# Patient Record
Sex: FEMALE | Race: WHITE | Employment: OTHER | URBAN - METROPOLITAN AREA
[De-identification: names, ages, dates, MRNs, and addresses within clinical notes are randomized per-mention and may not be internally consistent; named-entity substitution may affect disease eponyms.]

---

## 2018-04-20 ENCOUNTER — HOSPITAL ENCOUNTER (OUTPATIENT)
Age: 63
Discharge: HOME | End: 2018-04-22
Payer: COMMERCIAL

## 2018-04-20 LAB
BASOPHILS ABSOLUTE: 0.05 E9/L (ref 0–0.2)
BASOPHILS RELATIVE PERCENT: 0.6 % (ref 0–2)
EOSINOPHILS ABSOLUTE: 0.2 E9/L (ref 0.05–0.5)
EOSINOPHILS RELATIVE PERCENT: 2.5 % (ref 0–6)
HCT VFR BLD CALC: 37.3 % (ref 34–48)
HEMOGLOBIN: 12.1 G/DL (ref 11.5–15.5)
IMMATURE GRANULOCYTES #: 0.04 E9/L
IMMATURE GRANULOCYTES %: 0.5 % (ref 0–5)
LYMPHOCYTES ABSOLUTE: 2.15 E9/L (ref 1.5–4)
LYMPHOCYTES RELATIVE PERCENT: 26.4 % (ref 20–42)
MCH RBC QN AUTO: 30.3 PG (ref 26–35)
MCHC RBC AUTO-ENTMCNC: 32.4 % (ref 32–34.5)
MCV RBC AUTO: 93.3 FL (ref 80–99.9)
MONOCYTES ABSOLUTE: 0.61 E9/L (ref 0.1–0.95)
MONOCYTES RELATIVE PERCENT: 7.5 % (ref 2–12)
NEUTROPHILS ABSOLUTE: 5.1 E9/L (ref 1.8–7.3)
NEUTROPHILS RELATIVE PERCENT: 62.5 % (ref 43–80)
PDW BLD-RTO: 13 FL (ref 11.5–15)
PLATELET # BLD: 314 E9/L (ref 130–450)
PMV BLD AUTO: 10.7 FL (ref 7–12)
RBC # BLD: 4 E12/L (ref 3.5–5.5)
T4 FREE: 1.29 NG/DL (ref 0.93–1.7)
TSH SERPL DL<=0.05 MIU/L-ACNC: 4.34 UIU/ML (ref 0.27–4.2)
WBC # BLD: 8.2 E9/L (ref 4.5–11.5)

## 2018-04-20 PROCEDURE — 84443 ASSAY THYROID STIM HORMONE: CPT

## 2018-04-20 PROCEDURE — 84439 ASSAY OF FREE THYROXINE: CPT

## 2018-04-20 PROCEDURE — 82306 VITAMIN D 25 HYDROXY: CPT

## 2018-04-20 PROCEDURE — 85025 COMPLETE CBC W/AUTO DIFF WBC: CPT

## 2018-04-21 LAB — VITAMIN D 25-HYDROXY: 25 NG/ML (ref 30–100)

## 2018-05-16 ENCOUNTER — HOSPITAL ENCOUNTER (OUTPATIENT)
Dept: MAMMOGRAPHY | Age: 63
Discharge: HOME | End: 2018-05-18
Payer: COMMERCIAL

## 2018-05-16 DIAGNOSIS — Z12.31 SCREENING MAMMOGRAM, ENCOUNTER FOR: ICD-10-CM

## 2018-05-16 PROCEDURE — 77067 SCR MAMMO BI INCL CAD: CPT

## 2019-07-01 ENCOUNTER — HOSPITAL ENCOUNTER (OUTPATIENT)
Age: 64
Discharge: HOME | End: 2019-07-01
Payer: MEDICARE

## 2019-07-01 LAB
ALBUMIN SERPL-MCNC: 4.3 G/DL (ref 3.5–5.2)
ALP BLD-CCNC: 84 U/L (ref 35–104)
ALT SERPL-CCNC: 13 U/L (ref 0–32)
ANION GAP SERPL CALCULATED.3IONS-SCNC: 11 MMOL/L (ref 7–16)
AST SERPL-CCNC: 14 U/L (ref 0–31)
BASOPHILS ABSOLUTE: 0.05 E9/L (ref 0–0.2)
BASOPHILS RELATIVE PERCENT: 0.7 % (ref 0–2)
BILIRUB SERPL-MCNC: 0.3 MG/DL (ref 0–1.2)
BILIRUBIN URINE: NEGATIVE
BLOOD, URINE: NEGATIVE
BUN BLDV-MCNC: 21 MG/DL (ref 8–23)
CALCIUM SERPL-MCNC: 9.8 MG/DL (ref 8.6–10.2)
CHLORIDE BLD-SCNC: 99 MMOL/L (ref 98–107)
CHOLESTEROL, TOTAL: 131 MG/DL (ref 0–199)
CLARITY: CLEAR
CO2: 26 MMOL/L (ref 22–29)
COLOR: YELLOW
CREAT SERPL-MCNC: 1.2 MG/DL (ref 0.5–1)
CREATININE URINE: 62 MG/DL (ref 29–226)
EOSINOPHILS ABSOLUTE: 0.14 E9/L (ref 0.05–0.5)
EOSINOPHILS RELATIVE PERCENT: 1.9 % (ref 0–6)
GFR AFRICAN AMERICAN: 55
GFR NON-AFRICAN AMERICAN: 45 ML/MIN/1.73
GLUCOSE BLD-MCNC: 123 MG/DL (ref 74–99)
GLUCOSE URINE: NEGATIVE MG/DL
HBA1C MFR BLD: 6.8 % (ref 4–5.6)
HCT VFR BLD CALC: 37.2 % (ref 34–48)
HDLC SERPL-MCNC: 38 MG/DL
HEMOGLOBIN: 12.4 G/DL (ref 11.5–15.5)
IMMATURE GRANULOCYTES #: 0.04 E9/L
IMMATURE GRANULOCYTES %: 0.5 % (ref 0–5)
KETONES, URINE: NEGATIVE MG/DL
LDL CHOLESTEROL CALCULATED: 69 MG/DL (ref 0–99)
LEUKOCYTE ESTERASE, URINE: NEGATIVE
LYMPHOCYTES ABSOLUTE: 2.33 E9/L (ref 1.5–4)
LYMPHOCYTES RELATIVE PERCENT: 31.7 % (ref 20–42)
MCH RBC QN AUTO: 30.4 PG (ref 26–35)
MCHC RBC AUTO-ENTMCNC: 33.3 % (ref 32–34.5)
MCV RBC AUTO: 91.2 FL (ref 80–99.9)
MICROALBUMIN UR-MCNC: <12 MG/L
MICROALBUMIN/CREAT UR-RTO: ABNORMAL (ref 0–30)
MONOCYTES ABSOLUTE: 0.53 E9/L (ref 0.1–0.95)
MONOCYTES RELATIVE PERCENT: 7.2 % (ref 2–12)
NEUTROPHILS ABSOLUTE: 4.27 E9/L (ref 1.8–7.3)
NEUTROPHILS RELATIVE PERCENT: 58 % (ref 43–80)
NITRITE, URINE: NEGATIVE
PDW BLD-RTO: 12.6 FL (ref 11.5–15)
PH UA: 5.5 (ref 5–9)
PLATELET # BLD: 301 E9/L (ref 130–450)
PMV BLD AUTO: 9.4 FL (ref 7–12)
POTASSIUM SERPL-SCNC: 4.8 MMOL/L (ref 3.5–5)
PROTEIN UA: NEGATIVE MG/DL
RBC # BLD: 4.08 E12/L (ref 3.5–5.5)
SODIUM BLD-SCNC: 136 MMOL/L (ref 132–146)
SPECIFIC GRAVITY UA: 1.01 (ref 1–1.03)
T4 FREE: 1.02 NG/DL (ref 0.93–1.7)
TOTAL PROTEIN: 7.2 G/DL (ref 6.4–8.3)
TRIGL SERPL-MCNC: 120 MG/DL (ref 0–149)
TSH SERPL DL<=0.05 MIU/L-ACNC: 2.66 UIU/ML (ref 0.27–4.2)
UROBILINOGEN, URINE: 0.2 E.U./DL
VITAMIN D 25-HYDROXY: 20 NG/ML (ref 30–100)
VLDLC SERPL CALC-MCNC: 24 MG/DL
WBC # BLD: 7.4 E9/L (ref 4.5–11.5)

## 2019-07-01 PROCEDURE — 82306 VITAMIN D 25 HYDROXY: CPT

## 2019-07-01 PROCEDURE — 85025 COMPLETE CBC W/AUTO DIFF WBC: CPT

## 2019-07-01 PROCEDURE — 84443 ASSAY THYROID STIM HORMONE: CPT

## 2019-07-01 PROCEDURE — 80061 LIPID PANEL: CPT

## 2019-07-01 PROCEDURE — 83036 HEMOGLOBIN GLYCOSYLATED A1C: CPT

## 2019-07-01 PROCEDURE — 82570 ASSAY OF URINE CREATININE: CPT

## 2019-07-01 PROCEDURE — 36415 COLL VENOUS BLD VENIPUNCTURE: CPT

## 2019-07-01 PROCEDURE — 82044 UR ALBUMIN SEMIQUANTITATIVE: CPT

## 2019-07-01 PROCEDURE — 80053 COMPREHEN METABOLIC PANEL: CPT

## 2019-07-01 PROCEDURE — 84439 ASSAY OF FREE THYROXINE: CPT

## 2019-07-01 PROCEDURE — 81003 URINALYSIS AUTO W/O SCOPE: CPT

## 2020-01-06 RX ORDER — LISINOPRIL AND HYDROCHLOROTHIAZIDE 20; 12.5 MG/1; MG/1
1 TABLET ORAL DAILY
COMMUNITY

## 2020-01-06 RX ORDER — DULOXETIN HYDROCHLORIDE 60 MG/1
60 CAPSULE, DELAYED RELEASE ORAL NIGHTLY
COMMUNITY

## 2020-01-06 RX ORDER — CETIRIZINE HYDROCHLORIDE 10 MG/1
10 TABLET ORAL DAILY
COMMUNITY

## 2020-01-10 ENCOUNTER — INITIAL CONSULT (OUTPATIENT)
Dept: BARIATRICS/WEIGHT MGMT | Age: 65
End: 2020-01-10
Payer: MEDICARE

## 2020-01-10 VITALS
HEART RATE: 71 BPM | SYSTOLIC BLOOD PRESSURE: 138 MMHG | TEMPERATURE: 97.5 F | DIASTOLIC BLOOD PRESSURE: 48 MMHG | RESPIRATION RATE: 20 BRPM | HEIGHT: 62 IN | BODY MASS INDEX: 39.38 KG/M2 | WEIGHT: 214 LBS

## 2020-01-10 PROBLEM — E11.69 DIABETES MELLITUS TYPE 2 IN OBESE (HCC): Status: ACTIVE | Noted: 2020-01-10

## 2020-01-10 PROBLEM — E66.9 DIABETES MELLITUS TYPE 2 IN OBESE (HCC): Status: ACTIVE | Noted: 2020-01-10

## 2020-01-10 PROBLEM — K21.9 GASTROESOPHAGEAL REFLUX DISEASE WITHOUT ESOPHAGITIS: Status: ACTIVE | Noted: 2020-01-10

## 2020-01-10 PROCEDURE — G8419 CALC BMI OUT NRM PARAM NOF/U: HCPCS | Performed by: SURGERY

## 2020-01-10 PROCEDURE — G8484 FLU IMMUNIZE NO ADMIN: HCPCS | Performed by: SURGERY

## 2020-01-10 PROCEDURE — 1036F TOBACCO NON-USER: CPT | Performed by: SURGERY

## 2020-01-10 PROCEDURE — 99202 OFFICE O/P NEW SF 15 MIN: CPT

## 2020-01-10 PROCEDURE — 99204 OFFICE O/P NEW MOD 45 MIN: CPT | Performed by: SURGERY

## 2020-01-10 PROCEDURE — 3017F COLORECTAL CA SCREEN DOC REV: CPT | Performed by: SURGERY

## 2020-01-10 PROCEDURE — 2022F DILAT RTA XM EVC RTNOPTHY: CPT | Performed by: SURGERY

## 2020-01-10 PROCEDURE — G8427 DOCREV CUR MEDS BY ELIG CLIN: HCPCS | Performed by: SURGERY

## 2020-01-10 PROCEDURE — 3046F HEMOGLOBIN A1C LEVEL >9.0%: CPT | Performed by: SURGERY

## 2020-01-10 NOTE — PATIENT INSTRUCTIONS
Please continue to take your vitamin and mineral supplements as instructed. If you received a blood work prescription today for laboratory monitoring due prior to your next routine follow-up visit, please have this blood work obtained 10 to 14 days prior to your next visit. It is important to fast for 12 hours prior to routine weight loss surgery blood work, EXCEPT for drinking water, to ensure accuracy of results. Please report nausea, vomiting, abdominal pain, or any other problems you experience to your surgeon. For problems related to weight loss surgery, it is best to go to [address] [place] [address] Emergency Department and have your surgeon paged.

## 2020-01-10 NOTE — PROGRESS NOTES
by mouth daily      fluticasone (FLONASE) 50 MCG/ACT nasal spray 1 spray by Nasal route daily 1 Bottle 3    meloxicam (MOBIC) 7.5 MG tablet Take 7.5 mg by mouth daily      oxybutynin (DITROPAN) 5 MG tablet Take 5 mg by mouth 3 times daily      ezetimibe (ZETIA) 10 MG tablet Take 10 mg by mouth daily      Ergocalciferol (VITAMIN D2 PO) Take 1.25 mg by mouth once a week      omeprazole (PRILOSEC) 20 MG capsule Take 20 mg by mouth daily.  lovastatin (MEVACOR) 40 MG tablet Take 40 mg by mouth nightly.  metoprolol (LOPRESSOR) 25 MG tablet Take 25 mg by mouth nightly.  metFORMIN (GLUCOPHAGE) 500 MG tablet Take 500 mg by mouth 2 times daily (with meals).  levothyroxine (SYNTHROID) 50 MCG tablet Take 75 mcg by mouth Daily       gabapentin (NEURONTIN) 600 MG tablet Take 800 mg by mouth daily Takes 2 tablets      sertraline (ZOLOFT) 100 MG tablet Take 100 mg by mouth nightly. Takes 1and 1/2 tablets nightly      albuterol (PROVENTIL HFA;VENTOLIN HFA) 108 (90 BASE) MCG/ACT inhaler Inhale 2 puffs into the lungs every 6 hours as needed.  aspirin 81 MG tablet Take 81 mg by mouth daily.  fluticasone-vilanterol (BREO ELLIPTA) 100-25 MCG/INH AEPB inhaler Inhale 2 puffs into the lungs every 6 hours       No current facility-administered medications for this visit. Social History:   TOBACCO:   reports that she quit smoking about 6 years ago. Her smoking use included cigarettes. She has a 180.00 pack-year smoking history. She has never used smokeless tobacco.  All smokers must join the free smoking cessation program and stop smoking for 3 months before having any Bariatric surgery. ETOH:    reports no history of alcohol use. The patient has a family history that is negative for severe cardiovascular or respiratory issues, negative for reaction to anesthesia. Review of Systems    A complete 10 system review was performed and are otherwise negative unless mentioned in the above HPI.

## 2020-01-22 NOTE — PROGRESS NOTES
[address] [facility]                                                                                                                    PRE OP INSTRUCTIONS FOR  Shira Licona        Date: 1/22/2020    Date of surgery: 01/23/2020   Arrival Time: Hospital will call you between 5pm and 7pm with your final arrival time for surgery    1. Do not eat or drink anything after midnight prior to surgery. This includes no water, chewing gum, mints or ice chips. 2. Take the following medications with a small sip of water on the morning of Surgery: none- bring albuterol inhaler    3. Diabetics may take evening dose of insulin but none after midnight. If you feel symptomatic or low blood sugar morning of surgery drink 1-2 ounces of apple juice only. 4. Aspirin, Ibuprofen, Advil, Naproxen, Vitamin E and other Anti-inflammatory products should be stopped  before surgery  as directed by your physician. Take Tylenol only unless instructed otherwise by your surgeon. 5. Check with your Doctor regarding stopping Plavix, Coumadin, Lovenox, Eliquis, Effient, or other blood thinners. 6. Do not smoke,use illicit drugs and do not drink any alcoholic beverages 24 hours prior to surgery. 7. You may brush your teeth the morning of surgery. DO NOT SWALLOW WATER    8. You MUST make arrangements for a responsible adult to take you home after your surgery. You will not be allowed to leave alone or drive yourself home. It is strongly suggested someone stay with you the first 24 hrs. Your surgery will be cancelled if you do not have a ride home. 9. PEDIATRIC PATIENTS ONLY:  A parent/legal guardian must accompany a child scheduled for surgery and plan to stay at the hospital until the child is discharged. Please do not bring other children with you.     10. Please wear simple, loose fitting clothing to the hospital.  Thea Dessert not bring valuables (money, credit cards, checkbooks, etc.) Do not wear any makeup (including no

## 2020-01-23 ENCOUNTER — ANESTHESIA EVENT (OUTPATIENT)
Dept: ENDOSCOPY | Age: 65
End: 2020-01-23
Payer: MEDICARE

## 2020-01-23 ENCOUNTER — ANESTHESIA (OUTPATIENT)
Dept: ENDOSCOPY | Age: 65
End: 2020-01-23
Payer: MEDICARE

## 2020-01-23 ENCOUNTER — HOSPITAL ENCOUNTER (OUTPATIENT)
Dept: ULTRASOUND IMAGING | Age: 65
Discharge: HOME | End: 2020-01-23
Payer: MEDICARE

## 2020-01-23 ENCOUNTER — HOSPITAL ENCOUNTER (OUTPATIENT)
Age: 65
Setting detail: OUTPATIENT SURGERY
Discharge: HOME | End: 2020-01-23
Attending: SURGERY | Admitting: SURGERY
Payer: MEDICARE

## 2020-01-23 VITALS
SYSTOLIC BLOOD PRESSURE: 120 MMHG | OXYGEN SATURATION: 96 % | TEMPERATURE: 96.9 F | RESPIRATION RATE: 16 BRPM | WEIGHT: 212.3 LBS | HEIGHT: 62 IN | BODY MASS INDEX: 39.07 KG/M2 | DIASTOLIC BLOOD PRESSURE: 57 MMHG | HEART RATE: 60 BPM

## 2020-01-23 VITALS
SYSTOLIC BLOOD PRESSURE: 90 MMHG | RESPIRATION RATE: 17 BRPM | OXYGEN SATURATION: 94 % | DIASTOLIC BLOOD PRESSURE: 49 MMHG

## 2020-01-23 LAB
ALBUMIN SERPL-MCNC: 4.2 G/DL (ref 3.5–5.2)
ALP BLD-CCNC: 88 U/L (ref 35–104)
ALT SERPL-CCNC: 11 U/L (ref 0–32)
ANION GAP SERPL CALCULATED.3IONS-SCNC: 13 MMOL/L (ref 7–16)
AST SERPL-CCNC: 14 U/L (ref 0–31)
BILIRUB SERPL-MCNC: 0.4 MG/DL (ref 0–1.2)
BUN BLDV-MCNC: 22 MG/DL (ref 8–23)
CALCIUM SERPL-MCNC: 9.8 MG/DL (ref 8.6–10.2)
CHLORIDE BLD-SCNC: 99 MMOL/L (ref 98–107)
CHOLESTEROL, TOTAL: 113 MG/DL (ref 0–199)
CO2: 25 MMOL/L (ref 22–29)
CREAT SERPL-MCNC: 1.3 MG/DL (ref 0.5–1)
FERRITIN: 172 NG/ML
FOLATE: 11.7 NG/ML (ref 4.8–24.2)
GFR AFRICAN AMERICAN: 50
GFR NON-AFRICAN AMERICAN: 41 ML/MIN/1.73
GLUCOSE BLD-MCNC: 111 MG/DL (ref 74–99)
HBA1C MFR BLD: 6.5 % (ref 4–5.6)
HCT VFR BLD CALC: 33.5 % (ref 34–48)
HEMOGLOBIN: 11 G/DL (ref 11.5–15.5)
MCH RBC QN AUTO: 30.4 PG (ref 26–35)
MCHC RBC AUTO-ENTMCNC: 32.8 % (ref 32–34.5)
MCV RBC AUTO: 92.5 FL (ref 80–99.9)
METER GLUCOSE: 108 MG/DL (ref 74–99)
PDW BLD-RTO: 13 FL (ref 11.5–15)
PLATELET # BLD: 294 E9/L (ref 130–450)
PMV BLD AUTO: 9.6 FL (ref 7–12)
POTASSIUM SERPL-SCNC: 3.9 MMOL/L (ref 3.5–5)
RBC # BLD: 3.62 E12/L (ref 3.5–5.5)
SODIUM BLD-SCNC: 137 MMOL/L (ref 132–146)
TOTAL PROTEIN: 7.2 G/DL (ref 6.4–8.3)
TRIGL SERPL-MCNC: 117 MG/DL (ref 0–149)
VITAMIN B-12: 462 PG/ML (ref 211–946)
VITAMIN D 25-HYDROXY: 15 NG/ML (ref 30–100)
WBC # BLD: 8.3 E9/L (ref 4.5–11.5)

## 2020-01-23 PROCEDURE — 88342 IMHCHEM/IMCYTCHM 1ST ANTB: CPT

## 2020-01-23 PROCEDURE — 84478 ASSAY OF TRIGLYCERIDES: CPT

## 2020-01-23 PROCEDURE — 36415 COLL VENOUS BLD VENIPUNCTURE: CPT

## 2020-01-23 PROCEDURE — 3609012400 HC EGD TRANSORAL BIOPSY SINGLE/MULTIPLE: Performed by: SURGERY

## 2020-01-23 PROCEDURE — 84425 ASSAY OF VITAMIN B-1: CPT

## 2020-01-23 PROCEDURE — 2580000003 HC RX 258: Performed by: SURGERY

## 2020-01-23 PROCEDURE — 76705 ECHO EXAM OF ABDOMEN: CPT

## 2020-01-23 PROCEDURE — 82607 VITAMIN B-12: CPT

## 2020-01-23 PROCEDURE — 2709999900 HC NON-CHARGEABLE SUPPLY: Performed by: SURGERY

## 2020-01-23 PROCEDURE — 82728 ASSAY OF FERRITIN: CPT

## 2020-01-23 PROCEDURE — 84630 ASSAY OF ZINC: CPT

## 2020-01-23 PROCEDURE — 6360000002 HC RX W HCPCS: Performed by: NURSE ANESTHETIST, CERTIFIED REGISTERED

## 2020-01-23 PROCEDURE — 85027 COMPLETE CBC AUTOMATED: CPT

## 2020-01-23 PROCEDURE — 7100000010 HC PHASE II RECOVERY - FIRST 15 MIN: Performed by: SURGERY

## 2020-01-23 PROCEDURE — 80053 COMPREHEN METABOLIC PANEL: CPT

## 2020-01-23 PROCEDURE — 3700000000 HC ANESTHESIA ATTENDED CARE: Performed by: SURGERY

## 2020-01-23 PROCEDURE — 82746 ASSAY OF FOLIC ACID SERUM: CPT

## 2020-01-23 PROCEDURE — 88305 TISSUE EXAM BY PATHOLOGIST: CPT

## 2020-01-23 PROCEDURE — 82962 GLUCOSE BLOOD TEST: CPT

## 2020-01-23 PROCEDURE — 83036 HEMOGLOBIN GLYCOSYLATED A1C: CPT

## 2020-01-23 PROCEDURE — 2580000003 HC RX 258: Performed by: ANESTHESIOLOGY

## 2020-01-23 PROCEDURE — 7100000011 HC PHASE II RECOVERY - ADDTL 15 MIN: Performed by: SURGERY

## 2020-01-23 PROCEDURE — 82465 ASSAY BLD/SERUM CHOLESTEROL: CPT

## 2020-01-23 PROCEDURE — 82306 VITAMIN D 25 HYDROXY: CPT

## 2020-01-23 PROCEDURE — 43239 EGD BIOPSY SINGLE/MULTIPLE: CPT | Performed by: SURGERY

## 2020-01-23 RX ORDER — SODIUM CHLORIDE 9 MG/ML
INJECTION, SOLUTION INTRAVENOUS CONTINUOUS
Status: DISCONTINUED | OUTPATIENT
Start: 2020-01-23 | End: 2020-01-23 | Stop reason: HOSPADM

## 2020-01-23 RX ORDER — SODIUM CHLORIDE 0.9 % (FLUSH) 0.9 %
10 SYRINGE (ML) INJECTION PRN
Status: DISCONTINUED | OUTPATIENT
Start: 2020-01-23 | End: 2020-01-23 | Stop reason: HOSPADM

## 2020-01-23 RX ORDER — SODIUM CHLORIDE 0.9 % (FLUSH) 0.9 %
10 SYRINGE (ML) INJECTION EVERY 12 HOURS SCHEDULED
Status: DISCONTINUED | OUTPATIENT
Start: 2020-01-23 | End: 2020-01-23 | Stop reason: HOSPADM

## 2020-01-23 RX ORDER — PROPOFOL 10 MG/ML
INJECTION, EMULSION INTRAVENOUS PRN
Status: DISCONTINUED | OUTPATIENT
Start: 2020-01-23 | End: 2020-01-23 | Stop reason: SDUPTHER

## 2020-01-23 RX ADMIN — SODIUM CHLORIDE: 9 INJECTION, SOLUTION INTRAVENOUS at 10:02

## 2020-01-23 RX ADMIN — PROPOFOL 120 MG: 10 INJECTION, EMULSION INTRAVENOUS at 10:29

## 2020-01-23 RX ADMIN — SODIUM CHLORIDE: 9 INJECTION, SOLUTION INTRAVENOUS at 10:23

## 2020-01-23 ASSESSMENT — PAIN SCALES - GENERAL
PAINLEVEL_OUTOF10: 0
PAINLEVEL_OUTOF10: 0

## 2020-01-23 NOTE — OP NOTE
[address] [place] [address] [facility]           Operative Report    DATE OF PROCEDURE: 1/23/2020    [place]    SURGEON: Jasmeet Edouard MD.     ASSISTANT: none    PREOPERATIVE DIAGNOSES:  GERD    POSTOPERATIVE DIAGNOSES:   (1) No Hiatal Hernia  (2) Mild grade I Esophagitis  (3) No Gastritis    OPERATION: [id number] with antral biopsies    ANESTHESIA: LMAC    EBL: None    COMPLICATIONS: None. History and consent: This is a 62y.o. year old female who is having GERD. I have discussed with the patient the indication, alternatives, and the possible risks and/or complications of upper endoscopy and the conscious sedation anesthesia. The patient and/or family understands and agrees to proceed. Monitoring and Safety:    The patient was placed on a cardiac monitor and vital signs, pulse oximetry and level of consciousness were continuously evaluated throughout the procedure. The patient was closely monitored until recovery from the medications was complete and the patient had returned to baseline status. Anesthesia was present at all times during the procedure. OPERATIONS: The patient was placed on the table and sedated while blood pressure, pulse and pulse oximetry were continuously monitored by the anesthesia team. A bite block was placed prior to sedation and the patient was placed in the left lateral decubitus position. A lubricated scope was easily passed into the upper esophagus which looked normal. The distal esophagus looked abnormal: grade I esophagitis with effacement at GEJ. The scope was passed into the stomach and retroflexed. The gastroesophageal junction was at 39cm. There was no hiatal hernia. The scope was passed down toward the pylorus. The antral mucosa looked normal. Biopsy was taken to check for H. pylori.  The scope was then passed through the pylorus into the duodenal bulb which looked normal, then around to the distal duodenum which looked normal, and the scope was then withdrawn. The patient tolerated the procedure well.        Diet: Low fat, low calorie    PLAN:  (1) Follow up in office to discuss pathology, imaging, labs      Further Procedures:  Surgical weight loss pending approval    Physician Signature: Electronically signed by Dr. Alannah Escobar

## 2020-01-23 NOTE — H&P
Terri Corey  1/23/2020  [facility]    Initial Evaluation  Bariatric Surgery and EGD       Subjective:  CHIEF COMPLAINT: Morbid obesity, malnutrition, Type 2 Diabetes Mellitus, Hypertension, Hyperlipidemia, Obstructive Sleep Apnea, Dyspnea on Exertion, GERD, Degenerative Joint Disease (DJD), Anxiety, Bipolar Disorder and Nicotine dependance    HISTORY OF PRESENT ILLNESS: Terri Corey is a morbidly-obese 62 y.o.  female, who weighs 214 lb (97.1 kg). She is 85 pounds over her ideal body weight. The severity is severe with Body mass index is 39.46 kg/m². She has multiple medical problems aggravated by her obesity. They have been overweight since age 49. She wishes to have bariatric surgery so that she can lose a large amount of weight and keep the weight off. I have met with her in the [facility] where we discussed the surgery in great detail and went over the risks and benefits. She has watched our informational video so she understands all of the extensive risks involved. She states that she understands all of the risks and wishes to proceed with the evaluation. Past Medical History  Patient Active Problem List   Diagnosis    Gastroesophageal reflux disease without esophagitis    Diabetes mellitus type 2 in obese [facility])     Past Surgical History  Past Surgical History:   Procedure Laterality Date    ABDOMINAL EXPLORATION SURGERY      APPENDECTOMY  1968    BRONCHOSCOPY  Oct 2013    Bronchoscopy and Mediastinoscopy    OVARY REMOVAL  10/1995    left    TONSILLECTOMY  1972      Allergies: Milk-related compounds;  Other; and Codeine     Home Medications  Current Facility-Administered Medications   Medication Dose Route Frequency Provider Last Rate Last Dose    0.9 % sodium chloride infusion   Intravenous Continuous Frank Gant MD        0.9 % sodium chloride infusion   Intravenous Continuous Tiffanie Landeros  mL/hr at 01/23/20 1002      sodium chloride flush 0.9 % injection 10 mL  10 mL Intravenous 2 times per day Rodrigue Verdugo MD        sodium chloride flush 0.9 % injection 10 mL  10 mL Intravenous PRN Rodrigue Verdugo MD           Social History:   TOBACCO:   reports that she quit smoking about 6 years ago. Her smoking use included cigarettes. She has a 180.00 pack-year smoking history. She has never used smokeless tobacco.  All smokers must join the free smoking cessation program and stop smoking for 3 months before having any Bariatric surgery. ETOH:    reports no history of alcohol use. The patient has a family history that is negative for severe cardiovascular or respiratory issues, negative for reaction to anesthesia. Review of Systems    A complete 10 system review was performed and are otherwise negative unless mentioned in the above HPI. Specific negatives are listed below but may not include all those reviewed.     General ROS: negative obtundation, AMS  ENT ROS: negative rhinorrhea, epistaxis  Allergy and Immunology ROS: negative itchy/watery eyes or nasal congestion  Hematological and Lymphatic ROS: negative spontaneous bleeding or bruising  Endocrine ROS: negative  lethargy, mood swings, palpitations or polydipsia/polyuria  Respiratory ROS: negative sputum changes, stridor, tachypnea or wheezing  Cardiovascular ROS: negative for - loss of consciousness, murmur or orthopnea  Gastrointestinal ROS: negative for - hematochezia or hematemesis  Genito-Urinary ROS: negative for -  genital discharge or hematuria  Musculoskeletal ROS: negative for - focal weakness, gangrene  Psych/Neuro ROS: negative for - visual or auditory hallucinations, suicidal ideation      Physical Exam:   VITALS: BP (!) 111/51   Pulse 61   Temp 98 °F (36.7 °C) (Temporal)   Resp 16   Ht 5' 1.5\" (1.562 m)   Wt 212 lb 4.8 oz (96.3 kg)   LMP 01/01/1993   SpO2 96%   BMI 39.46 kg/m²   General appearance: AAO, NAD  Eyes: PERRL, EOMI, red conjunctiva  Lungs:

## 2020-01-24 LAB — ZINC: 76.2 UG/DL (ref 60–120)

## 2020-01-26 LAB — VITAMIN B1 WHOLE BLOOD: 68 NMOL/L (ref 70–180)

## 2020-01-29 ENCOUNTER — INITIAL CONSULT (OUTPATIENT)
Dept: BARIATRICS/WEIGHT MGMT | Age: 65
End: 2020-01-29
Payer: MEDICARE

## 2020-01-29 VITALS — HEIGHT: 62 IN | BODY MASS INDEX: 38.28 KG/M2 | WEIGHT: 208 LBS

## 2020-01-29 PROCEDURE — 99999 PR OFFICE/OUTPT VISIT,PROCEDURE ONLY: CPT | Performed by: DIETITIAN, REGISTERED

## 2020-01-29 RX ORDER — ERGOCALCIFEROL 1.25 MG/1
50000 CAPSULE ORAL
Qty: 24 CAPSULE | Refills: 0 | Status: SHIPPED
Start: 2020-01-30 | End: 2022-05-17

## 2020-01-29 RX ORDER — THIAMINE HCL 50 MG
50 TABLET ORAL 2 TIMES DAILY
Qty: 60 TABLET | Refills: 0 | Status: SHIPPED
Start: 2020-01-29 | End: 2020-05-11

## 2020-01-29 NOTE — PATIENT INSTRUCTIONS
[facility]  Dietary Initial Appointment Patient Instructions    By: Armani Briscoe RD / VEL  [phone]      At your initial dietary appointment you have received the following information packets:    Please be aware at each visit you have been instructed that in order for your insurance company to approve your surgery you must show a consistent weight loss of 2 lbs or greater at each visit. We can not guarantee an approval by your insurance company we can only provide the information given to us it is up to you the patient to show compliancy to your insurance company. If you do gain weight during your supervised weight loss counseling sessions insurance companies starting in 2018 are denying patients for not showing consistent weight loss results when part of a supervised weight loss counseling program. Pt was instructed on 1/29/20. Pt verbalized understanding. · Low-Fat Diet  - Please be sure to begin your low-fat diet from today's date until your scheduled surgery date. If you are not at goal weight by your history and physical appointment with your surgeon your surgery will be cancelled. · Goal Weight: 206 lbs BMI: 38.3 -  Pt instructed to not go below 190 lbs  · Low-Fat Diet Contract - Patient Acknowledge and Signed  · Supplement List - Please be sure to be saving to purchase your 3 month supply of supplements. If you do not bring supplements to your history and physical appointment your surgery will be cancelled. · Supplement Contract - Patient Acknowledge and Signed  · Please be sure to take a daily Multi-vitamin from now until surgery to reduce your incidence of infection. · If your insurance company requires additional dietary counseling you will be scheduled to see the dietitian the following month.   ·  If your psychological evaluation is completed and all your dietary requirements for your individual insurance company have been completed you will be submitted to insurance. Please make sure to check with us to see if we have received your psychological evaluation. Please be aware that any co-pays or deductibles may be requested prior to testing and / or procedures. You will need to schedule a psychological evaluation for weight loss surgery. Patients will be required to complete all psychological testing as required by the psychologist. Patients must follow all of the psychologist's recommendations before weight loss surgery can be scheduled. The evaluation must be done a standard way for weight loss surgery. We strongly recommend that you contact one of our preferred providers listed below to arrange this:    Romona Cabot, [address] [facility]                                                              [address] [place] [address]                                                                                      [phone]     Dr. Kumar Ybarra, PhD           [facility]                                                              [address]                                                                                                [phone]        Dr. Walker Saldana, PhD                         [facility]. [place], [place]                                                                                              [phone]     You will also need to plan on attending a 2 hour nutrition class at the [facility] prior to your surgery. We will schedule this for you when we schedule your surgery. Please remember to have your labs drawn prior to your scheduled appointment to review the results of your testing.      Please remember, that while we will submit your case to insurance for surgery authorization, it is your responsibility to know if your plan covers weight loss surgery and keep up-to-date with changes to your insurance coverage. We will do everything possible to help you get approved for weight loss surgery, but cannot guarantee an approval.      Please note that you will not be submitted to your insurance company until all   pre-operative testing requirements are met. · Please remember you need to schedule to attend one Support Group meeting held at the [facility] before surgery. This one meeting is mandatory. You can attend as many support group meetings before and after surgery. Support group meetings are held at the [facility] from 6:00 - 8:00pm 1st, and 3rd Tuesday of every month. See dates listed below. · Each individual person has his / her own medical requirements that need fulfilled before being able to schedule bariatric surgery . Please finalize these requirements by contacting our Bariatric Nurse at [phone]. Pre-Op Labs - 1/23/20 - Glucose 111H, Creat 1.3H, HGB 11.0L, HCT 33.5L, HGBA1C 6.5H, Vitamin D 15L, Vitamin B1 68 L - HGB / HCT Referred to Svetlana SEQUEIRA to address. Dr. Gwen Rosales is tx pts Vitamin D deficiency with Vitamin D2 50,000iu 2 times a week and Thiamine 50 mgs 1 tablet 2 times daily. Lab Recheck 3/11/20. F/U appt with Dr. Gwen Rosales 3/27/20     High Vitamin D Foods:  Written By: Shereen Dhillon RD/VEL    What role does Vitamin D play in the body? Vitamin D is known as the sunshine vitamin. Vitamin D is actually a hormone that is produced in our bodies by ultraviolet B rays. These light rays trigger the production of vitamin D by our skin cells. The hormone that is produced is called calcitriol and once it is made it travels to the intestines to help with the absorption of dietary calcium, as well as fluoride. How does Vitamin D work? Vitamin D and Calcium work together to promote the growth of bones and development of healthy teeth. The amount of vitamin D you produce effects the amount of calcium that can be absorbed by the body.   When calcium from foods you eat reaches the intestines, it waits for the presence of the vitamin D hormone, calcitriol, to be able to cross the intestinal membranes and to be transported to your bones. Without sufficient vitamin D, calcium levels in the bones and teeth will decrease leading to weak, brittle bones and increasing the possibility of osteoporosis. WHY? Calcium is primarily found in the blood and needs to remain balanced at a constant level. The calcium that is in the blood is there to be used for quick transport to muscles and nerves when required. When calcium is sent to required organs, the calcium levels in the blood become low. Calcium is then pulled out of the bones and goes into the blood to replace what was sent to the muscles or nerves. This is the normal cycle and is perfect if you have a continuous supply of calcium from the diet to replace calcium from bones and teeth to keep blood levels steady. If dietary calcium levels are depleted, the blood will keep pulling calcium away from the bones and teeth and the structures will be weak. Where can Vitamin D be found? Vitamin D can be self-synthesized with sunlight. Vitamin D is also found in fortified milk, fortified margarine, egg yolks, liver and fatty fish. What occurs when you are deficient in Vitamin D? Bones and Teeth:  Shai Bergamo can occur with Vitamin D deficiency, which is known as softening of the bones and teeth. Due to the softening effect we can see deformities of the limbs, spine, thorax and pelvis. Also seen is pain in the pelvis, lower back and legs, including increase risk to bone fractures. Blood:  Vitamin D deficiency can cause decreased calcium and/or phosphorus in the blood and increased alkaline phosphatase. Nervous/Muscular Systems:  Vitamin D deficiency can cause lax muscles resulting in protrusion of the abdomen (Pot Belly) and muscle spasms.   Some patients complain of involuntary twitching and  muscle spasms. Excretory System:  Vitamin D deficiency can cause increased calcium in the stool and decreased calcium in the urine. Other:  Vitamin D deficiency can cause abnormally high secretions of parathormone, which is why we run lab work at 1 year to make sure the calcium supplements you are taking are being absorbed correctly. Vitamin D in Commonly Eaten Foods Ranked Richest to [employer]:      Food Serving Size IU of   Vitamin D----------        Herring, fresh, raw,  1 oz. 255 IU   Theodosia 1 oz. 142 IU   Milk,Cows Fortified 1 Cup 100 IU   Sardines, canned 1 oz. 85 IU   Chicken-Liver, Cooked 3oz. 45 IU   Shrimp, Canned 1oz. 30 IU   Egg Yolk 1 25 IU   Calf-Liver, Cooked 3oz. 12 IU   Cream, Light 1 T 8 IU   Cheddar Cheese 1 oz. 3 IU   Oysters 4 3 IU   Butter  1 tsp 1.4 IU            Unfortunately after RYGB surgery you will not be able to increase your Vitamin D with diet alone. A Vitamin D supplement will be needed in order to improve Vitamin D lab values. High Thiamin Foods  Written By: Trae Montgomery, RD/LD    What role does Thiamin play in the body? Thiamin helps all body cells produce energy from carbohydrates. Thiamin supports appetite and nerve function. Where can Thiamin be found? Thiamin occurs in all nutritious foods in moderate amounts. Examples - Pork, Ham, Liver, other Organ Meats, Whole-Grain and Enriched Breads, Fortified Cereals, Legumes and Nuts. People who derive a large proportion of their energy from empty K-Calorie items like sugar or alcohol risk thiamin deficiency. What occurs when you are deficient in Thiamin? Nerve Damage  Blood/Circulatory System:  Edema, Enlarged Heart, Abnormal Heart Rhythms, Heart Failure.     Nervous/Muscular System:  Degeneration, Wasting, Weakness, Painful Calf Muscles, Low-Morale, Difficulty Walking, Loss of Ankle and Knee-Jerk Reflexes, Mental Confusion and Paralysis    If you get too much:  Excess amounts of Thiamin are excreted in the urine. Contrary to popular claims, extra amounts have no energy boosting effect. Thiamin in Commonly Eaten Foods Ranked Richest to [employer]:      Food   Serving Size   Milligrams of   Thiamin     Pork Chop   3.1 oz. broiled (278 Kcal)    1.23 mg     Ham   3oz. canned roasted (140 kcal)   . 80 mg     Green Peas   1 cup cooked (126 Kcal)   . 50 mg     Black Beans   1 cup cooked (227 Kcal)   . 40 mg     Watermelon   1 Slice (575 Kcal)   . 35 mg     Split, Peas   1 cup cooked (231 Kcal)    . 35 mg     Food   Serving Size   Milligrams of   Thiamin     Black-eyed Peas   1 cup cooked (198 Kcal)   . 30 mg     Kidney Beans   1cup cooked (217 Kcal)    . 28 mg     Oatmeal    1 cup cooked (145 Kcal)   . 28 mg     [place] Squash   1cup boiled (83 Kcal)    . 28 mg     Winter Squash   1 cup baked (95 Kcal)    . 25 mg     Baked, Potato   1 Whole (220 Kcal)   . 23 mg     Asparagus   1 cup cooked (45 Kcal)   . 22 mg     Yogurt, nonfat   1 cup (136 Kcal)   . 10 mg     Sirloin Steak   3 oz. Lean (171 Kcal)   . 10 mg     Cantaloupe   ½ (94 Kcal)   . 10 mg     Honeydew Melon   1/10 (45 Kcal)    . 10 mg     Tofu(Soybean Curd)   ½ cup (94 Kcal)   . 10 mg     Bread, Whole Wheat   1 slice    . 10 mg     Green Beans   1 cup cooked (44 Kcal)   . 10 mg     [place] Sprouts   1 cup Fresh (31 Kcal)   . 10 mg     Broccoli   1cup cooked (44 Kcal)    . 10 mg     Cauliflower   1 cup cooked (30 Kcal)   . 10 mg     Summer Squash   1 cup  cooked (36 Kcal)   . 10 mg     Mushrooms   1 cup raw sliced (18 Kcal)   . 10 mg     Zucchini   1 cup cooked (29 Kcal)   . 10 mg     Tomato   1 whole raw (26 Kcal)   . 10 mg       Food     Serving Size     Milligrams of   Thiamin     Pool Lettuce    1 cup chopped (9 Kcal)   . 5 mg     Chicken Breast   ½ roasted (97 Kcal)   . 5 mg     Loose- Leaf Lettuce   1 cup (10 Kcal)   . 3 mg     Apple   1 fresh medium (81 Kcal)   . 3 mg     Low-fat Cheddar Cheese   1 oz. (114 Kcal)   . 2 mg       Unfortunately after bariatric surgery you will not be able to increase thiamin level with diet alone. A thiamin supplement may need to be added in order to improve thiamin lab values.

## 2020-01-29 NOTE — PROGRESS NOTES
[facility]:  Initial Nutrition Consultation    Today's Date: 1/29/2020  Patients Name:Kimberly Butler     Height: 5' 1.5\" (1.562 m)   Weight: 208 lb (94.3 kg)   IBW: 129 lbs  %IBW: 161%  Excess Weight: 79 lbs  BMI: Body mass index is 38.66 kg/m². Subjective Information:   Patient has tried multiple means of weight loss including diet and exercise in the past and has been unable to maintain a healthy weight. Patients overall goal is to be able to lose weight with diet and exercise by changing lifestyle, habits and maintain a healthy weight for a lifetime. Are your currently Diabetic - Yes  Type 2 Diabetic  - Yes  Medication to Control Diabetes   Metformin  Last Blood Glucose Reading - 1/23/20 - Glucose 111H  Last HGBA1C - 1/23/20 - HGBA1C 6.5H    Patient states the following will be the most difficult change after weight loss surgery? Pt states avoiding carbonated beverages after weight loss surgery will be difficult. Most successful diet in the past? Weight Watchers  Length of time patient was on the diet listed above? 2 to 3 Month's  Weight lost on the diet listed above? 10 lbs  - 1st attempt - 5 lbs 2nd Attempt  Patient stated he / she maintained his / her weight for the following time? Pt states she did not maintain the weight loss for a long period of time. Scale of 1 (Least Likely) to 10 ( Most Likely  - What is your readiness to change and adhere to your new diet and lifestyle change we reviewed - 10  At a level 10 How do you foresee yourself being successful with the change - Pt states she wants to get back to a healthy state and feel good. Pt wants the health benefits of having weight loss sx. Pt wants improvement in her joints and breathing. Patient takes the following vitamins, minerals and dietary supplements? No - I do not currently take a daily complete multi-vitamin.   Rd / Ld has educated the patient that we recommend in order to decrease infection rates patient start a daily complete multi-vitamin from now until surgery if proceeding with surgery. Patient consumes the following beverage daily? Diet Coke    Pre-Op Labs - 1/23/20 - Glucose 111H, Creat 1.3H, HGB 11.0L, HCT 33.5L, HGBA1C 6.5H, Vitamin D 15L, Vitamin B1 68 L - HGB / HCT Referred to Svetlana SEQUEIRA to address. Dr. Garduno Covert is tx pts Vitamin D deficiency with Vitamin D2 50,000iu 2 times a week and Thiamine 50 mgs 1 tablet 2 times daily. Lab Recheck 3/11/20. F/U appt with Dr. Garduno Covert 3/27/20     Patient states he / she has the following problems:  no - Eating  no - Chewing  no - Swallowing  no - Nausea  no - Vomiting  no - Diarrhea  no - Constipation  yes - Hair Changes - D/T Thyroid Medication  no - Skin Changes  no - Tongue Texture    Food Allergies: no  -   Food Intolerances: yes - Lactose Intolerance - Milk and Ice Cream Only    Yes - Past medically assisted weight loss history reviewed. Yes - Provided education regarding the basic role of nutrition in junction with Bariatric Surgery. Yes - Provided overview of required dietary and behavioral changes pre and post operatively. Yes - Stated / reinforced that changes in dietary behaviors are critical to successful, long term weight Loss. Patient is aware that in order to proceed with bariatric surgery he / she will need to follow a low-fat diet prior to surgery. Patient is aware that bariatric surgery is a lifetime change that will require the patient to follow a low-fat, low-calorie, low-sugar, portion controlled diet with at least 30 minutes of physical activity daily. Patient is aware that certain foods may not be tolerated after bariatric surgery and certain foods will need avoided all together.     Smith Second / LD feels that this patient knowledge / readiness to make appropriate diet / lifestyle changes assessed to be? - Good    RD / LD feels this patients expected adherence for post surgical diet? - Good    Patient was instructed and

## 2020-01-31 ENCOUNTER — TELEPHONE (OUTPATIENT)
Dept: BARIATRICS/WEIGHT MGMT | Age: 65
End: 2020-01-31

## 2020-01-31 ENCOUNTER — OFFICE VISIT (OUTPATIENT)
Dept: BARIATRICS/WEIGHT MGMT | Age: 65
End: 2020-01-31
Payer: MEDICARE

## 2020-01-31 VITALS
RESPIRATION RATE: 20 BRPM | HEART RATE: 71 BPM | DIASTOLIC BLOOD PRESSURE: 59 MMHG | TEMPERATURE: 97.5 F | BODY MASS INDEX: 38.64 KG/M2 | HEIGHT: 62 IN | SYSTOLIC BLOOD PRESSURE: 123 MMHG | WEIGHT: 210 LBS

## 2020-01-31 PROCEDURE — 99213 OFFICE O/P EST LOW 20 MIN: CPT | Performed by: SURGERY

## 2020-01-31 PROCEDURE — 1036F TOBACCO NON-USER: CPT | Performed by: SURGERY

## 2020-01-31 PROCEDURE — G8427 DOCREV CUR MEDS BY ELIG CLIN: HCPCS | Performed by: SURGERY

## 2020-01-31 PROCEDURE — G8417 CALC BMI ABV UP PARAM F/U: HCPCS | Performed by: SURGERY

## 2020-01-31 PROCEDURE — 99212 OFFICE O/P EST SF 10 MIN: CPT

## 2020-01-31 PROCEDURE — G8484 FLU IMMUNIZE NO ADMIN: HCPCS | Performed by: SURGERY

## 2020-01-31 PROCEDURE — 3017F COLORECTAL CA SCREEN DOC REV: CPT | Performed by: SURGERY

## 2020-01-31 RX ORDER — FERROUS SULFATE 325(65) MG
325 TABLET ORAL
Qty: 30 TABLET | Refills: 1 | Status: SHIPPED
Start: 2020-01-31 | End: 2022-05-17

## 2020-01-31 NOTE — PROGRESS NOTES
Nenita Carlin  1/31/2020  [address]     Post-EGD Follow-up. Subjective:   Nenita Carlin is a 62 y.o. female post EGD done 2 weeks ago. She did not have a hiatal hernia, did not have gastritis. Biopsy did not show Helicobacter pylori. The gallbladder ultrasound showed stones. Denies any change in medical history since our last visit. They had a good experience at [facility] during their visit for preoperative testing. We discussed again the surgical options. They are interested in pursuing RNYGB. Weight: 210 lb (95.3 kg). A complete 10 system review was performed and are otherwise negative unless mentioned in the above HPI. Specific negatives are listed below but may not include all those reviewed.     General ROS: negative obtundation, AMS  ENT ROS: negative rhinorrhea, epistaxis  Allergy and Immunology ROS: negative itchy/watery eyes or nasal congestion  Hematological and Lymphatic ROS: negative spontaneous bleeding or bruising  Endocrine ROS: negative  lethargy, mood swings, palpitations or polydipsia/polyuria  Respiratory ROS: negative sputum changes, stridor, tachypnea or wheezing  Cardiovascular ROS: negative for - loss of consciousness, murmur or orthopnea  Gastrointestinal ROS: negative for - hematochezia or hematemesis  Genito-Urinary ROS: negative for -  genital discharge or hematuria  Musculoskeletal ROS: negative for - focal weakness, gangrene  Psych/Neuro ROS: negative for - visual or auditory hallucinations, suicidal ideation    Physical exam:    BP (!) 123/59 (Site: Left Lower Arm, Position: Sitting, Cuff Size: Large Adult)   Pulse 71   Temp 97.5 °F (36.4 °C) (Temporal)   Resp 20   Ht 5' 1.5\" (1.562 m)   Wt 210 lb (95.3 kg)   LMP 01/01/1993   BMI 39.04 kg/m²   General appearance: AAO, NAD  Eyes: PERRL, EOMI, red conjunctiva  Lungs: Equal chest rise bilateral  Chest wall: atraumatic, no tenderness, no echymosis or abrasions  Heart: reg rate, no

## 2020-02-11 ENCOUNTER — INITIAL CONSULT (OUTPATIENT)
Dept: BARIATRICS/WEIGHT MGMT | Age: 65
End: 2020-02-11
Payer: MEDICARE

## 2020-02-11 PROCEDURE — 99999 PR OFFICE/OUTPT VISIT,PROCEDURE ONLY: CPT

## 2020-02-12 ENCOUNTER — TELEPHONE (OUTPATIENT)
Dept: BARIATRICS/WEIGHT MGMT | Age: 65
End: 2020-02-12

## 2020-02-13 VITALS — WEIGHT: 207 LBS | BODY MASS INDEX: 38.48 KG/M2

## 2020-02-13 NOTE — PROGRESS NOTES
WEIGHT:  207 lb (93.9 kg)    Pt was in th office for his/her 2nd weight Loss appointment. Pt is aware he/she must meet his/her pre-op weight loss goal in order to proceed with weight loss surgery. Rosas / Chris faxed a copy of what was reviewed with the pt to her PCP. Pt verbalized understanding. Rosas// Chris enc the following at today's visit for successful post-surgical Weight Maintenance    Education:  Patient has been educated on the importance of reading food labels for the content of sugar and the content of fat that is in the foods serving size contributing to overall calorie consumption. Patient is aware how to identify hidden sugars and hidden fats found in food and reduce calorie intake of empty calories and high fat foods. Patient can verbalize the importance of label reading. Demonstrate the difference between a healthy food label and unhealthy food label. Real life food replicas demonstrating hidden sugars and hidden fats, and actual food labels were part of the patients education to help understand healthy eating habits and determine healthy food choices. 1. Weigh yourself daily and record it. 2. Keep documented food records daily   3. Just be more active in day to day routine   4. Higher protein intake and a higher fiber intake. Not a high protein or a high fiber diet just a higher intake. 5.Eliminate empty calorie consumption    Rosas Perez addressed the following with the pt:  - Rosas Perez enc pt to comply with nutrition recommendations  - Rd / Ld enc Participation in support group meetings  - Rosas Perez enc pt to go back to maintenance of food records and weight monitoring records   - Rosas Perez reviewed the importance of adequate sleep and stress management  - Rosas Perez reviewed nonfood strategies to cope with emotions and stress  - Rosas Perez encouraged pt to practice the following: Mindful eating: Eating slowly:  Focusing   on the eating experience without distraction  - Rosas Perez enc. pt to pay attention to hunger and

## 2020-02-27 ENCOUNTER — INITIAL CONSULT (OUTPATIENT)
Dept: BARIATRICS/WEIGHT MGMT | Age: 65
End: 2020-02-27
Payer: MEDICARE

## 2020-02-27 PROCEDURE — 1036F TOBACCO NON-USER: CPT | Performed by: SOCIAL WORKER

## 2020-02-27 PROCEDURE — 90791 PSYCH DIAGNOSTIC EVALUATION: CPT | Performed by: SOCIAL WORKER

## 2020-02-27 NOTE — PATIENT INSTRUCTIONS
for wanting to change, think about the progress you've made, and give yourself a pep talk and a pat on the back. · Get professional help. A dietitian can help you make your diet healthier while still allowing you to eat foods that you enjoy. A  or physical therapist can help design an exercise program that is fun and easy to stay on. A counselor, a , or your doctor can help you overcome hurdles, reduce stress, or quit smoking. Where can you learn more? Go to [email or web address].[email or web address]. org and sign in to your [employer] account. Enter R154 in the Cover Lockscreen box to learn more about \"Learning About Changing a Habit by Setting Goals. \"     If you do not have an account, please click on the \"Sign Up Now\" link. Current as of: May 28, 2019  Content Version: 12.3  © 3564-5403 [employer]. Care instructions adapted under license by [facility] ([facility]). If you have questions about a medical condition or this instruction, always ask your healthcare professional. [address] any warranty or liability for your use of this information. Assignments/Recommendations: 3-4 follow-up sessions with SW for further education/evaluation. Complete entries in \"Emotional Eating\" to discuss next session. Attend [facility] support group. Follow-up with: (referrals/present providers/all scheduled appointments at [facility].
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
## 2020-03-02 ENCOUNTER — TELEPHONE (OUTPATIENT)
Dept: BARIATRICS/WEIGHT MGMT | Age: 65
End: 2020-03-02

## 2020-03-02 ENCOUNTER — OFFICE VISIT (OUTPATIENT)
Dept: CARDIOLOGY CLINIC | Age: 65
End: 2020-03-02
Payer: MEDICARE

## 2020-03-02 VITALS
BODY MASS INDEX: 37.73 KG/M2 | DIASTOLIC BLOOD PRESSURE: 48 MMHG | WEIGHT: 205 LBS | HEIGHT: 62 IN | HEART RATE: 59 BPM | SYSTOLIC BLOOD PRESSURE: 112 MMHG

## 2020-03-02 PROBLEM — N18.30 TYPE 2 DIABETES MELLITUS WITH STAGE 3 CHRONIC KIDNEY DISEASE, WITHOUT LONG-TERM CURRENT USE OF INSULIN (HCC): Status: ACTIVE | Noted: 2020-01-10

## 2020-03-02 PROBLEM — E66.01 MORBID OBESITY (HCC): Status: ACTIVE | Noted: 2020-03-02

## 2020-03-02 PROBLEM — R06.09 EXERTIONAL DYSPNEA: Status: ACTIVE | Noted: 2020-03-02

## 2020-03-02 PROBLEM — I10 ESSENTIAL HYPERTENSION: Status: ACTIVE | Noted: 2020-03-02

## 2020-03-02 PROBLEM — E78.2 MIXED HYPERLIPIDEMIA: Status: ACTIVE | Noted: 2020-03-02

## 2020-03-02 PROBLEM — Z01.810 PREOPERATIVE CARDIOVASCULAR EXAMINATION: Status: ACTIVE | Noted: 2020-03-02

## 2020-03-02 PROBLEM — E66.8 MODERATE OBESITY: Status: ACTIVE | Noted: 2020-03-02

## 2020-03-02 PROBLEM — J44.9 CHRONIC OBSTRUCTIVE PULMONARY DISEASE (HCC): Status: ACTIVE | Noted: 2020-03-02

## 2020-03-02 PROBLEM — E11.22 TYPE 2 DIABETES MELLITUS WITH STAGE 3 CHRONIC KIDNEY DISEASE, WITHOUT LONG-TERM CURRENT USE OF INSULIN (HCC): Status: ACTIVE | Noted: 2020-01-10

## 2020-03-02 PROBLEM — E66.9 MODERATE OBESITY: Status: ACTIVE | Noted: 2020-03-02

## 2020-03-02 PROCEDURE — 93000 ELECTROCARDIOGRAM COMPLETE: CPT | Performed by: INTERNAL MEDICINE

## 2020-03-02 PROCEDURE — 1036F TOBACCO NON-USER: CPT | Performed by: INTERNAL MEDICINE

## 2020-03-02 PROCEDURE — 3017F COLORECTAL CA SCREEN DOC REV: CPT | Performed by: INTERNAL MEDICINE

## 2020-03-02 PROCEDURE — 2022F DILAT RTA XM EVC RTNOPTHY: CPT | Performed by: INTERNAL MEDICINE

## 2020-03-02 PROCEDURE — G8484 FLU IMMUNIZE NO ADMIN: HCPCS | Performed by: INTERNAL MEDICINE

## 2020-03-02 PROCEDURE — G8427 DOCREV CUR MEDS BY ELIG CLIN: HCPCS | Performed by: INTERNAL MEDICINE

## 2020-03-02 PROCEDURE — G8417 CALC BMI ABV UP PARAM F/U: HCPCS | Performed by: INTERNAL MEDICINE

## 2020-03-02 PROCEDURE — 3044F HG A1C LEVEL LT 7.0%: CPT | Performed by: INTERNAL MEDICINE

## 2020-03-02 PROCEDURE — G8926 SPIRO NO PERF OR DOC: HCPCS | Performed by: INTERNAL MEDICINE

## 2020-03-02 PROCEDURE — 99204 OFFICE O/P NEW MOD 45 MIN: CPT | Performed by: INTERNAL MEDICINE

## 2020-03-02 PROCEDURE — 3023F SPIROM DOC REV: CPT | Performed by: INTERNAL MEDICINE

## 2020-03-02 RX ORDER — MONTELUKAST SODIUM 10 MG/1
TABLET ORAL NIGHTLY
COMMUNITY
Start: 2020-02-26

## 2020-03-02 NOTE — PROGRESS NOTES
 GERD (gastroesophageal reflux disease)     History of cardiovascular stress test 2014    lexiscan    Hyperlipidemia     Hypertension     Mitral valve prolapse     Neuropathy     Overactive thyroid gland     Rapid heartbeat     Shortness of breath     Sleep apnea     Uses cpap    Thyroid disease        Past Surgical History:  Past Surgical History:   Procedure Laterality Date    ABDOMINAL EXPLORATION SURGERY      APPENDECTOMY  1968    BRONCHOSCOPY  Oct 2013    Bronchoscopy and Mediastinoscopy    OVARY REMOVAL  10/1995    left    TONSILLECTOMY  1972    UPPER GASTROINTESTINAL ENDOSCOPY N/A 2020    EGD BIOPSY performed by Maria C Zuleta MD at [facility] ENDOSCOPY       Family History:  Family History   Problem Relation Age of Onset    Cancer Mother     Cancer Father     Cancer Maternal Grandmother     Cancer Maternal Grandfather     No Known Problems Paternal Grandfather     No Known Problems Paternal Grandmother        Social History:  Social History     Socioeconomic History    Marital status:      Spouse name: Not on file    Number of children: Not on file    Years of education: Not on file    Highest education level: Not on file   Occupational History    Not on file   Social Needs    Financial resource strain: Not on file    Food insecurity:     Worry: Not on file     Inability: Not on file    Transportation needs:     Medical: Not on file     Non-medical: Not on file   Tobacco Use    Smoking status: Former Smoker     Packs/day: 4.00     Years: 45.00     Pack years: 180.00     Types: Cigarettes     Last attempt to quit: 2013     Years since quittin.4    Smokeless tobacco: Never Used    Tobacco comment: Uses E-cig   Substance and Sexual Activity    Alcohol use: No     Comment: 8 cans pop daily    Drug use: No    Sexual activity: Yes   Lifestyle    Physical activity:     Days per week: Not on file     Minutes per session: Not on file    Stress: Not on file   Relationships    Social connections:     Talks on phone: Not on file     Gets together: Not on file     Attends Christian service: Not on file     Active member of club or organization: Not on file     Attends meetings of clubs or organizations: Not on file     Relationship status: Not on file    Intimate partner violence:     Fear of current or ex partner: Not on file     Emotionally abused: Not on file     Physically abused: Not on file     Forced sexual activity: Not on file   Other Topics Concern    Not on file   Social History Narrative    Not on file       Allergies: Allergies   Allergen Reactions    Food Other (See Comments)     Lactose Intolerance - Milk and Ice Cream Only    Milk-Related Compounds Other (See Comments)     Intolerance      Other      Enviromental      Codeine Nausea And Vomiting     itching       Current Medications:  Current Outpatient Medications   Medication Sig Dispense Refill    montelukast (SINGULAIR) 10 MG tablet take 1 tablet by mouth once daily      ferrous sulfate 325 (65 Fe) MG tablet Take 1 tablet by mouth daily (with breakfast) Take with your multi vitamin daily.  30 tablet 1    vitamin D (ERGOCALCIFEROL) 1.25 MG (69031 UT) CAPS capsule Take 1 capsule by mouth Twice a Week 24 capsule 0    Thiamine HCl (VITAMIN B-1) 50 MG tablet Take 1 tablet by mouth 2 times daily 60 tablet 0    lisinopril-hydrochlorothiazide (PRINZIDE;ZESTORETIC) 20-12.5 MG per tablet Take 1 tablet by mouth daily      DULoxetine (CYMBALTA) 60 MG extended release capsule Take 60 mg by mouth daily      cetirizine (ZYRTEC) 10 MG tablet Take 10 mg by mouth daily      fluticasone-vilanterol (BREO ELLIPTA) 100-25 MCG/INH AEPB inhaler Inhale 2 puffs into the lungs every 6 hours      fluticasone (FLONASE) 50 MCG/ACT nasal spray 1 spray by Nasal route daily 1 Bottle 3    meloxicam (MOBIC) 7.5 MG tablet Take 7.5 mg by mouth daily      oxybutynin (DITROPAN) 5 MG tablet Take 5 mg by mouth 3 times daily      ezetimibe (ZETIA) 10 MG tablet Take 10 mg by mouth daily      Ergocalciferol (VITAMIN D2 PO) Take 1.25 mg by mouth once a week      omeprazole (PRILOSEC) 20 MG capsule Take 20 mg by mouth daily.  lovastatin (MEVACOR) 40 MG tablet Take 40 mg by mouth nightly.  metoprolol (LOPRESSOR) 25 MG tablet Take 25 mg by mouth nightly.  metFORMIN (GLUCOPHAGE) 500 MG tablet Take 500 mg by mouth 2 times daily (with meals).  levothyroxine (SYNTHROID) 50 MCG tablet Take 75 mcg by mouth Daily       gabapentin (NEURONTIN) 600 MG tablet Take 800 mg by mouth daily Takes 2 tablets      sertraline (ZOLOFT) 100 MG tablet Take 100 mg by mouth nightly. Takes 1and 1/2 tablets nightly      albuterol (PROVENTIL HFA;VENTOLIN HFA) 108 (90 BASE) MCG/ACT inhaler Inhale 2 puffs into the lungs every 6 hours as needed.  aspirin 81 MG tablet Take 81 mg by mouth daily. No current facility-administered medications for this visit. Physical Exam:  BP (!) 112/48   Pulse 59   Ht 5' 1.5\" (1.562 m)   Wt 205 lb (93 kg)   LMP 01/01/1993   BMI 38.11 kg/m²   Wt Readings from Last 3 Encounters:   03/02/20 205 lb (93 kg)   02/13/20 207 lb (93.9 kg)   01/31/20 210 lb (95.3 kg)     The patient is awake, alert and in no discomfort or distress. No gross musculoskeletal deformity or lymphadenopathy are present. No significant skin or nail changes are present. Gross examination of head, eyes, nose and throat are negative. Jugular venous pressure is normal and no carotid bruits are present. No thyromegaly is noted. Normal respiratory effort is noted with no accessory muscle usage present. Lung fields are clear to ascultation. Cardiac examination is notable for a regular rate and rhythm with no palpable thrill. No gallop rhythm or cardiac murmur are identified. A benign abdominal examination is present with the exception of obesity and no masses or organomegaly. A normal abdominal aortic pulsation is present. [facility]    An electronic copy of this consult note was forwarded to Dr. Tiffanie Landeros

## 2020-03-11 ENCOUNTER — HOSPITAL ENCOUNTER (OUTPATIENT)
Dept: GENERAL RADIOLOGY | Age: 65
Discharge: HOME | End: 2020-03-13
Payer: MEDICARE

## 2020-03-11 ENCOUNTER — HOSPITAL ENCOUNTER (OUTPATIENT)
Dept: PREADMISSION TESTING | Age: 65
Discharge: HOME | End: 2020-03-11
Payer: MEDICARE

## 2020-03-11 VITALS
BODY MASS INDEX: 37.54 KG/M2 | HEIGHT: 62 IN | RESPIRATION RATE: 16 BRPM | SYSTOLIC BLOOD PRESSURE: 97 MMHG | HEART RATE: 73 BPM | WEIGHT: 204 LBS | DIASTOLIC BLOOD PRESSURE: 51 MMHG | OXYGEN SATURATION: 97 % | TEMPERATURE: 98 F

## 2020-03-11 LAB
ANION GAP SERPL CALCULATED.3IONS-SCNC: 15 MMOL/L (ref 7–16)
BUN BLDV-MCNC: 28 MG/DL (ref 8–23)
CALCIUM SERPL-MCNC: 10.7 MG/DL (ref 8.6–10.2)
CHLORIDE BLD-SCNC: 95 MMOL/L (ref 98–107)
CHOLESTEROL, FASTING: 140 MG/DL (ref 0–199)
CO2: 25 MMOL/L (ref 22–29)
CREAT SERPL-MCNC: 1.2 MG/DL (ref 0.5–1)
FERRITIN: 217 NG/ML
GFR AFRICAN AMERICAN: 55
GFR NON-AFRICAN AMERICAN: 45 ML/MIN/1.73
GLUCOSE BLD-MCNC: 100 MG/DL (ref 74–99)
HCT VFR BLD CALC: 36.7 % (ref 34–48)
HDLC SERPL-MCNC: 42 MG/DL
HEMOGLOBIN: 12.1 G/DL (ref 11.5–15.5)
LDL CHOLESTEROL CALCULATED: 68 MG/DL (ref 0–99)
MCH RBC QN AUTO: 30.6 PG (ref 26–35)
MCHC RBC AUTO-ENTMCNC: 33 % (ref 32–34.5)
MCV RBC AUTO: 92.9 FL (ref 80–99.9)
PDW BLD-RTO: 12.6 FL (ref 11.5–15)
PLATELET # BLD: 318 E9/L (ref 130–450)
PMV BLD AUTO: 9.8 FL (ref 7–12)
POTASSIUM REFLEX MAGNESIUM: 4.7 MMOL/L (ref 3.5–5)
RBC # BLD: 3.95 E12/L (ref 3.5–5.5)
SODIUM BLD-SCNC: 135 MMOL/L (ref 132–146)
T4 FREE: 1.22 NG/DL (ref 0.93–1.7)
TRIGLYCERIDE, FASTING: 150 MG/DL (ref 0–149)
TSH SERPL DL<=0.05 MIU/L-ACNC: 1.7 UIU/ML (ref 0.27–4.2)
VITAMIN D 25-HYDROXY: 29 NG/ML (ref 30–100)
VLDLC SERPL CALC-MCNC: 30 MG/DL
WBC # BLD: 8.1 E9/L (ref 4.5–11.5)

## 2020-03-11 PROCEDURE — 84443 ASSAY THYROID STIM HORMONE: CPT

## 2020-03-11 PROCEDURE — 80048 BASIC METABOLIC PNL TOTAL CA: CPT

## 2020-03-11 PROCEDURE — 84425 ASSAY OF VITAMIN B-1: CPT

## 2020-03-11 PROCEDURE — 82728 ASSAY OF FERRITIN: CPT

## 2020-03-11 PROCEDURE — 84439 ASSAY OF FREE THYROXINE: CPT

## 2020-03-11 PROCEDURE — 80061 LIPID PANEL: CPT

## 2020-03-11 PROCEDURE — 71046 X-RAY EXAM CHEST 2 VIEWS: CPT

## 2020-03-11 PROCEDURE — 85027 COMPLETE CBC AUTOMATED: CPT

## 2020-03-11 PROCEDURE — 82306 VITAMIN D 25 HYDROXY: CPT

## 2020-03-11 PROCEDURE — 36415 COLL VENOUS BLD VENIPUNCTURE: CPT

## 2020-03-11 NOTE — PROGRESS NOTES
Faxed bmp results to dr Harish Lee and dr Hawa Yao offices, confirmations received.
checkbooks, etc.) Do not wear any makeup (including no eye makeup) or nail polish on your fingers or toes. 11. DO NOT wear any jewelry or piercings on day of surgery. All body piercing jewelry must be removed. 12. Shower the night before surgery with ___Antibacterial soap /MIKA WIPES________    13. TOTAL JOINT REPLACEMENT/HYSTERECTOMY PATIENTS ONLY---Remember to bring Blood Bank bracelet to the hospital on the day of surgery. 14. If you have a Living Will and Durable Power of  for Healthcare, please bring in a copy. 15. If appropriate bring crutches, inspirex, WALKER, CANE etc... 12. Notify your Surgeon if you develop any illness between now and surgery time, cough, cold, fever, sore throat, nausea, vomiting, etc.  Please notify your surgeon if you experience dizziness, shortness of breath or blurred vision between now & the time of your surgery. 17. If you have ___dentures, they will be removed before going to the OR; we will provide you a container. If you wear ___contact lenses or ___glasses, they will be removed; please bring a case for them. 18. To provide excellent care visitors will be limited to 2 in the room at any given time. 19. Please bring picture ID and insurance card. 20. Sleep apnea patients need to bring CPAP AND SETTINGS to hospital on day of surgery. 21. During flu season no children under the age of 15 are permitted in the hospital for the safety of all patients. 22. Other                  Please call AMBULATORY CARE if you have any further questions.    [address] [facility]     [address]

## 2020-03-13 ENCOUNTER — TELEPHONE (OUTPATIENT)
Dept: BARIATRICS/WEIGHT MGMT | Age: 65
End: 2020-03-13

## 2020-03-13 ENCOUNTER — HOSPITAL ENCOUNTER (OUTPATIENT)
Dept: CARDIOLOGY | Age: 65
Discharge: HOME | End: 2020-03-13
Payer: MEDICARE

## 2020-03-13 LAB
LV EF: 60 %
LVEF MODALITY: NORMAL

## 2020-03-13 PROCEDURE — 93306 TTE W/DOPPLER COMPLETE: CPT

## 2020-03-13 NOTE — TELEPHONE ENCOUNTER
Rosas Perez contacted the pt and LM that we would not be accepting pts at the [facility] on Monday, March 16th 2020 to complete final dietary appointments instead we would be calling them at their scheduled appointment time on Monday in order to get this completed. Rosas Perez LM that the pt needs to be available for these calls at this time in order to keep everything on track for insurance submission. Pt can call with any questions.

## 2020-03-14 LAB — VITAMIN B1 WHOLE BLOOD: 132 NMOL/L (ref 70–180)

## 2020-03-16 ENCOUNTER — TELEPHONE (OUTPATIENT)
Dept: BARIATRICS/WEIGHT MGMT | Age: 65
End: 2020-03-16

## 2020-03-16 ENCOUNTER — TELEPHONE (OUTPATIENT)
Dept: CARDIOLOGY CLINIC | Age: 65
End: 2020-03-16

## 2020-03-16 ENCOUNTER — INITIAL CONSULT (OUTPATIENT)
Dept: BARIATRICS/WEIGHT MGMT | Age: 65
End: 2020-03-16

## 2020-03-16 VITALS — WEIGHT: 204 LBS | BODY MASS INDEX: 37.54 KG/M2 | HEIGHT: 62 IN

## 2020-03-16 PROCEDURE — 99999 PR OFFICE/OUTPT VISIT,PROCEDURE ONLY: CPT | Performed by: DIETITIAN, REGISTERED

## 2020-03-16 NOTE — PROGRESS NOTES
WEIGHT:  204 lb (92.5 kg)    Pt was in th office for his / her 3rd weight Loss appointment. Pt lost 3 lbs since his / her last appointment and has lost 12% of her EBW. Pt is down 10 lbs since starting. Rd / Ld educated the pt on Mindful versus Mindless Eating. Pt is aware he / she must meet his / her pre-op weight loss goal of 206 lbs in order to proceed with weight loss surgery. Rd / Ld faxed a copy of what was reviewed with the pt to her PCP. Pt verbalized understanding. Rd / Ld reviewed insurance company weight loss requirement on 3/16/20. Pt verbalized understanding. Please be aware at each visit you have been instructed that in order for your insurance company to approve your surgery you must show a consistent weight loss of 2 lbs or greater at each visit. We can not guarantee an approval by your insurance company we can only provide the information given to us it is up to you the patient to show compliancy to your insurance company.   If you do gain weight during your supervised weight loss counseling sessions insurance companies starting in 2018 are denying patients for not showing consistent weight loss results when part of a supervised weight loss counseling program.

## 2020-03-17 ENCOUNTER — TELEPHONE (OUTPATIENT)
Dept: BARIATRICS/WEIGHT MGMT | Age: 65
End: 2020-03-17

## 2020-03-17 NOTE — TELEPHONE ENCOUNTER
SW phone PT regarding appointment that has been cancelled due to COVID 19. Left a message for the PT to call to reschedule the appointment.          CATRINA Denis

## 2020-03-18 ENCOUNTER — TELEPHONE (OUTPATIENT)
Dept: BARIATRICS/WEIGHT MGMT | Age: 65
End: 2020-03-18

## 2020-03-18 RX ORDER — ERGOCALCIFEROL 1.25 MG/1
50000 CAPSULE ORAL
Qty: 12 CAPSULE | Refills: 0 | Status: SHIPPED
Start: 2020-03-18 | End: 2021-04-02

## 2020-03-18 NOTE — LETTER
[facility]   [address] [facility]  [place]oya [place]  Phone: [phone]  Fax: [phone]    Chandu Eid RD, VEL        March 18, 2020    [facility] [place] [id number]      Dear Janette Baxter: We called on 3/18/20 and reviewed the following see below. If you have any questions please give our office a call. Rosas Perez called patient due to patient cancelled her appointments due to Covid - 19. Pt was being treated for a Vit D deficiency, Fe+ deficiency, Vitamin B1 deficiency and had her labs rechecked. Pt was taking Vitamin D 50,000iu twice weekly, Ferrous Sulfate 325 mg 1 tablet daily, Thiamine 50 mgs 1 tablet 2 times daily. Rosas Perez educated the pt that on 3/14/20 pts Vitamin B1 is 132 within normal limits, 3/11/20 - Ferritin 217 within normal limits, HGB and HCT within normal limits, Vitamin D 29L. Per Dr. Perri Langston and Dr. Bibi Carbajal  patient is to take a Daily Complete Multi-Vitamin, finish the rest of the Iron that she has left due to only being a few tablets per pt, pt finished her Thiamine. Pt is to add Vitamin D2 50,000iu three times a week with lab recheck in 4 weeks - 4/15/20 and F/U appt with Dr. Perri Langston on 4/24/20. Lab scripts mailed to pt. Scripts sent to pharmacy. Pt verbalized instruction. Letter mailed see attached letter. Pt can call with any questions. High Vitamin D Foods:  Written By: Chandu Eid RD/VEL    What role does Vitamin D play in the body? Vitamin D is known as the sunshine vitamin. Vitamin D is actually a hormone that is produced in our bodies by ultraviolet B rays. These light rays trigger the production of vitamin D by our skin cells. The hormone that is produced is called calcitriol and once it is made it travels to the intestines to help with the absorption of dietary calcium, as well as fluoride. How does Vitamin D work?

## 2020-03-24 ENCOUNTER — TELEPHONE (OUTPATIENT)
Dept: BARIATRICS/WEIGHT MGMT | Age: 65
End: 2020-03-24

## 2020-03-24 NOTE — TELEPHONE ENCOUNTER
Original fax sent 2/11/20. PCP sent 2nd visit back unsigned. Rosas Perez refaxed 2nd visit d/t second visit being unsigned by PCP. [facility] awaiting PCP signature.

## 2020-04-01 PROBLEM — Z01.810 PREOPERATIVE CARDIOVASCULAR EXAMINATION: Status: RESOLVED | Noted: 2020-03-02 | Resolved: 2020-04-01

## 2020-04-13 ENCOUNTER — TELEPHONE (OUTPATIENT)
Dept: BARIATRICS/WEIGHT MGMT | Age: 65
End: 2020-04-13

## 2020-04-15 ENCOUNTER — HOSPITAL ENCOUNTER (OUTPATIENT)
Age: 65
Discharge: HOME | End: 2020-04-15
Payer: MEDICARE

## 2020-04-15 LAB — VITAMIN D 25-HYDROXY: 37 NG/ML (ref 30–100)

## 2020-04-15 PROCEDURE — 82306 VITAMIN D 25 HYDROXY: CPT

## 2020-04-15 PROCEDURE — 36415 COLL VENOUS BLD VENIPUNCTURE: CPT

## 2020-04-24 ENCOUNTER — TELEMEDICINE (OUTPATIENT)
Dept: BARIATRICS/WEIGHT MGMT | Age: 65
End: 2020-04-24
Payer: MEDICARE

## 2020-04-24 PROCEDURE — 1036F TOBACCO NON-USER: CPT | Performed by: SURGERY

## 2020-04-24 PROCEDURE — G8428 CUR MEDS NOT DOCUMENT: HCPCS | Performed by: SURGERY

## 2020-04-24 PROCEDURE — 3017F COLORECTAL CA SCREEN DOC REV: CPT | Performed by: SURGERY

## 2020-04-24 PROCEDURE — 99213 OFFICE O/P EST LOW 20 MIN: CPT | Performed by: SURGERY

## 2020-04-24 PROCEDURE — G8417 CALC BMI ABV UP PARAM F/U: HCPCS | Performed by: SURGERY

## 2020-04-24 NOTE — PROGRESS NOTES
pain, dyspnea on exertion, edema, irregular heartbeat,  palpitations. Gastrointestinal: abdominal pain, constipation, diarrhea,  decreased appetite, heartburn, hematemesis, melena, nausea, vomiting, stool incontinence, abnormal swallowing. Genito-Urinary: dysuria, hematuria, incontinence, frequency, urgency. Musculoskeletal: joint pain, stiffness, swelling, muscle pain, muscle  tenderness. Neurological: confusion, memory loss, dizziness, gait disturbance, headaches, impaired coordination, decreased balance, numbness/tingling, seizures, speech problems, tremors,weakness. Dermatological:  hair changes, nail changes, pruritu      Physical Exam: Respiratory rate was: normal   General: The patient is in no apparent distress. Body habitus is obese. HEENT: No rhinorrhea, sneezing, yawning, or lacrimation. No scleral icterus or conjunctival injection. SKIN: No piloerection. No track marks. No rash. No erythema or ecchymosis. Psychological: Mood and affect are appropriate. Hygiene appears appropriate. Cardiovascular: There is no edema. Respiratory: Respirations are regular and unlabored. There is no cyanosis. Gastrointestinal: Soft abdomen, non-tender. Genitourinary: No costovertebral angle tenderness. MSK: Spinal curvatures were normal in standing. Pelvis was level in standing. Active range of motion was full. There was no obvious joint effusion, deformity. Neurologic: Awake, alert and oriented in three planes. Speech is fluent. No facial weakness. Tongue is midline. Hearing is intact for conversation. Pupils are equal and round. Extraocular muscles appear intact during visual tracking. Shoulder shrug symmetric. Sensation: Intact for light touch (patient elicited) in all upper and lower extremity dermatomes.   Strength: Functional upper extremity strength testing was observed to be at least antigravity and lower extremity strength testing including heel and toe walking as well as duck

## 2020-04-27 ENCOUNTER — TELEPHONE (OUTPATIENT)
Dept: BARIATRICS/WEIGHT MGMT | Age: 65
End: 2020-04-27

## 2020-04-27 NOTE — TELEPHONE ENCOUNTER
Per order of Dr. Eyad Vides patient is ready to be scheduled for Lap/nick with IOC. She does have cardiac clearance. She has been having a lot more discomfort with the gallstones. Call placed to patient and she would like her surgery to be done on 05/12/2020. She knows to be NPO after midnight. She will use skin prep. She will not go to hospital constipated. Patient placed on google calendar and faxed surgery scheduling sheet to [address]. Post op appointment set.

## 2020-04-30 ENCOUNTER — TELEPHONE (OUTPATIENT)
Dept: BARIATRICS/WEIGHT MGMT | Age: 65
End: 2020-04-30

## 2020-05-04 ENCOUNTER — TELEPHONE (OUTPATIENT)
Dept: BARIATRICS/WEIGHT MGMT | Age: 65
End: 2020-05-04

## 2020-05-05 ENCOUNTER — TELEMEDICINE (OUTPATIENT)
Dept: BARIATRICS/WEIGHT MGMT | Age: 65
End: 2020-05-05
Payer: MEDICARE

## 2020-05-05 PROCEDURE — 90837 PSYTX W PT 60 MINUTES: CPT | Performed by: SOCIAL WORKER

## 2020-05-08 ENCOUNTER — HOSPITAL ENCOUNTER (OUTPATIENT)
Age: 65
Discharge: HOME | End: 2020-05-10
Payer: MEDICARE

## 2020-05-08 PROCEDURE — U0003 INFECTIOUS AGENT DETECTION BY NUCLEIC ACID (DNA OR RNA); SEVERE ACUTE RESPIRATORY SYNDROME CORONAVIRUS 2 (SARS-COV-2) (CORONAVIRUS DISEASE [COVID-19]), AMPLIFIED PROBE TECHNIQUE, MAKING USE OF HIGH THROUGHPUT TECHNOLOGIES AS DESCRIBED BY CMS-2020-01-R: HCPCS

## 2020-05-10 LAB
SARS-COV-2: NOT DETECTED
SOURCE: NORMAL

## 2020-05-11 ENCOUNTER — ANESTHESIA EVENT (OUTPATIENT)
Dept: OPERATING ROOM | Age: 65
End: 2020-05-11
Payer: MEDICARE

## 2020-05-11 RX ORDER — M-VIT,TX,IRON,MINS/CALC/FOLIC 27MG-0.4MG
1 TABLET ORAL DAILY
COMMUNITY
End: 2022-08-26 | Stop reason: ALTCHOICE

## 2020-05-11 RX ORDER — BUPROPION HYDROCHLORIDE 150 MG/1
150 TABLET ORAL NIGHTLY
COMMUNITY

## 2020-05-11 RX ORDER — BUDESONIDE AND FORMOTEROL FUMARATE DIHYDRATE 160; 4.5 UG/1; UG/1
2 AEROSOL RESPIRATORY (INHALATION) 2 TIMES DAILY
COMMUNITY

## 2020-05-11 NOTE — PROGRESS NOTES
eye makeup) or nail polish on your fingers or toes. 11. DO NOT wear any jewelry or piercings on day of surgery. All body piercing jewelry must be removed. 12. Shower the night before surgery with _x__Antibacterial soap /MIKA WIPES________    13. TOTAL JOINT REPLACEMENT/HYSTERECTOMY PATIENTS ONLY---Remember to bring Blood Bank bracelet to the hospital on the day of surgery. 14. If you have a Living Will and Durable Power of  for Healthcare, please bring in a copy. 15. If appropriate bring crutches, inspirex, WALKER, CANE etc... 12. Notify your Surgeon if you develop any illness between now and surgery time, cough, cold, fever, sore throat, nausea, vomiting, etc.  Please notify your surgeon if you experience dizziness, shortness of breath or blurred vision between now & the time of your surgery. 17. If you have ___dentures, they will be removed before going to the OR; we will provide you a container. If you wear ___contact lenses or ___glasses, they will be removed; please bring a case for them. 18. To provide excellent care visitors will be limited to 2 in the room at any given time. 19. Please bring picture ID and insurance card. 20. Sleep apnea patients need to bring CPAP AND SETTINGS to hospital on day of surgery. 21. During flu season no children under the age of 15 are permitted in the hospital for the safety of all patients. 22. The day of your surgery enter through the main lobby entrance and report to the information desk. If you and your ride have a mask please bring them with your. You may have 1 person come with you the day of your surgery. Please call AMBULATORY CARE if you have any further questions.    [address]     [address]

## 2020-05-12 ENCOUNTER — ANESTHESIA (OUTPATIENT)
Dept: OPERATING ROOM | Age: 65
End: 2020-05-12
Payer: MEDICARE

## 2020-05-12 ENCOUNTER — HOSPITAL ENCOUNTER (OUTPATIENT)
Age: 65
Setting detail: OUTPATIENT SURGERY
Discharge: HOME | End: 2020-05-12
Attending: SURGERY | Admitting: SURGERY
Payer: MEDICARE

## 2020-05-12 ENCOUNTER — APPOINTMENT (OUTPATIENT)
Dept: GENERAL RADIOLOGY | Age: 65
End: 2020-05-12
Attending: SURGERY
Payer: MEDICARE

## 2020-05-12 VITALS
SYSTOLIC BLOOD PRESSURE: 139 MMHG | OXYGEN SATURATION: 95 % | RESPIRATION RATE: 18 BRPM | DIASTOLIC BLOOD PRESSURE: 84 MMHG | TEMPERATURE: 98.6 F

## 2020-05-12 VITALS
HEART RATE: 72 BPM | OXYGEN SATURATION: 94 % | WEIGHT: 202 LBS | TEMPERATURE: 96.8 F | RESPIRATION RATE: 17 BRPM | DIASTOLIC BLOOD PRESSURE: 54 MMHG | BODY MASS INDEX: 37.17 KG/M2 | SYSTOLIC BLOOD PRESSURE: 102 MMHG | HEIGHT: 62 IN

## 2020-05-12 LAB
ANION GAP SERPL CALCULATED.3IONS-SCNC: 14 MMOL/L (ref 7–16)
BUN BLDV-MCNC: 21 MG/DL (ref 8–23)
CALCIUM SERPL-MCNC: 9.5 MG/DL (ref 8.6–10.2)
CHLORIDE BLD-SCNC: 99 MMOL/L (ref 98–107)
CO2: 26 MMOL/L (ref 22–29)
CREAT SERPL-MCNC: 1.3 MG/DL (ref 0.5–1)
GFR AFRICAN AMERICAN: 50
GFR NON-AFRICAN AMERICAN: 41 ML/MIN/1.73
GLUCOSE BLD-MCNC: 107 MG/DL (ref 74–99)
HCT VFR BLD CALC: 35.3 % (ref 34–48)
HEMOGLOBIN: 12 G/DL (ref 11.5–15.5)
MCH RBC QN AUTO: 31.2 PG (ref 26–35)
MCHC RBC AUTO-ENTMCNC: 34 % (ref 32–34.5)
MCV RBC AUTO: 91.7 FL (ref 80–99.9)
PDW BLD-RTO: 12.7 FL (ref 11.5–15)
PLATELET # BLD: 299 E9/L (ref 130–450)
PMV BLD AUTO: 9.3 FL (ref 7–12)
POTASSIUM SERPL-SCNC: 4.3 MMOL/L (ref 3.5–5)
RBC # BLD: 3.85 E12/L (ref 3.5–5.5)
SODIUM BLD-SCNC: 139 MMOL/L (ref 132–146)
WBC # BLD: 9.3 E9/L (ref 4.5–11.5)

## 2020-05-12 PROCEDURE — 6360000002 HC RX W HCPCS

## 2020-05-12 PROCEDURE — 2500000003 HC RX 250 WO HCPCS: Performed by: NURSE ANESTHETIST, CERTIFIED REGISTERED

## 2020-05-12 PROCEDURE — 7100000000 HC PACU RECOVERY - FIRST 15 MIN: Performed by: SURGERY

## 2020-05-12 PROCEDURE — 7100000010 HC PHASE II RECOVERY - FIRST 15 MIN: Performed by: SURGERY

## 2020-05-12 PROCEDURE — 2709999900 HC NON-CHARGEABLE SUPPLY: Performed by: SURGERY

## 2020-05-12 PROCEDURE — 6360000002 HC RX W HCPCS: Performed by: SURGERY

## 2020-05-12 PROCEDURE — 3600000014 HC SURGERY LEVEL 4 ADDTL 15MIN: Performed by: SURGERY

## 2020-05-12 PROCEDURE — 94640 AIRWAY INHALATION TREATMENT: CPT

## 2020-05-12 PROCEDURE — 3600000004 HC SURGERY LEVEL 4 BASE: Performed by: SURGERY

## 2020-05-12 PROCEDURE — 3700000001 HC ADD 15 MINUTES (ANESTHESIA): Performed by: SURGERY

## 2020-05-12 PROCEDURE — 6370000000 HC RX 637 (ALT 250 FOR IP): Performed by: ANESTHESIOLOGY

## 2020-05-12 PROCEDURE — 80048 BASIC METABOLIC PNL TOTAL CA: CPT

## 2020-05-12 PROCEDURE — 2580000003 HC RX 258: Performed by: SURGERY

## 2020-05-12 PROCEDURE — 47563 LAPARO CHOLECYSTECTOMY/GRAPH: CPT | Performed by: SURGERY

## 2020-05-12 PROCEDURE — 2500000003 HC RX 250 WO HCPCS: Performed by: SURGERY

## 2020-05-12 PROCEDURE — 85027 COMPLETE CBC AUTOMATED: CPT

## 2020-05-12 PROCEDURE — 6360000002 HC RX W HCPCS: Performed by: ANESTHESIOLOGY

## 2020-05-12 PROCEDURE — 7100000011 HC PHASE II RECOVERY - ADDTL 15 MIN: Performed by: SURGERY

## 2020-05-12 PROCEDURE — 3700000000 HC ANESTHESIA ATTENDED CARE: Performed by: SURGERY

## 2020-05-12 PROCEDURE — 88304 TISSUE EXAM BY PATHOLOGIST: CPT

## 2020-05-12 PROCEDURE — C1894 INTRO/SHEATH, NON-LASER: HCPCS | Performed by: SURGERY

## 2020-05-12 PROCEDURE — 36415 COLL VENOUS BLD VENIPUNCTURE: CPT

## 2020-05-12 PROCEDURE — 6360000004 HC RX CONTRAST MEDICATION: Performed by: SURGERY

## 2020-05-12 PROCEDURE — 74300 X-RAY BILE DUCTS/PANCREAS: CPT

## 2020-05-12 PROCEDURE — 7100000001 HC PACU RECOVERY - ADDTL 15 MIN: Performed by: SURGERY

## 2020-05-12 PROCEDURE — 2500000003 HC RX 250 WO HCPCS

## 2020-05-12 RX ORDER — IPRATROPIUM BROMIDE AND ALBUTEROL SULFATE 2.5; .5 MG/3ML; MG/3ML
1 SOLUTION RESPIRATORY (INHALATION)
Status: COMPLETED | OUTPATIENT
Start: 2020-05-12 | End: 2020-05-12

## 2020-05-12 RX ORDER — GLYCOPYRROLATE 1 MG/5 ML
SYRINGE (ML) INTRAVENOUS PRN
Status: DISCONTINUED | OUTPATIENT
Start: 2020-05-12 | End: 2020-05-12 | Stop reason: SDUPTHER

## 2020-05-12 RX ORDER — ROCURONIUM BROMIDE 10 MG/ML
INJECTION, SOLUTION INTRAVENOUS PRN
Status: DISCONTINUED | OUTPATIENT
Start: 2020-05-12 | End: 2020-05-12 | Stop reason: SDUPTHER

## 2020-05-12 RX ORDER — MEPERIDINE HYDROCHLORIDE 25 MG/ML
25 INJECTION INTRAMUSCULAR; INTRAVENOUS; SUBCUTANEOUS EVERY 5 MIN PRN
Status: DISCONTINUED | OUTPATIENT
Start: 2020-05-12 | End: 2020-05-12 | Stop reason: HOSPADM

## 2020-05-12 RX ORDER — ONDANSETRON 4 MG/1
4 TABLET, ORALLY DISINTEGRATING ORAL EVERY 8 HOURS PRN
Qty: 20 TABLET | Refills: 1 | Status: SHIPPED | OUTPATIENT
Start: 2020-05-12 | End: 2022-05-17

## 2020-05-12 RX ORDER — CEFAZOLIN SODIUM 2 G/50ML
2 SOLUTION INTRAVENOUS
Status: COMPLETED | OUTPATIENT
Start: 2020-05-12 | End: 2020-05-12

## 2020-05-12 RX ORDER — OXYCODONE HYDROCHLORIDE AND ACETAMINOPHEN 5; 325 MG/1; MG/1
1 TABLET ORAL EVERY 6 HOURS PRN
Qty: 18 TABLET | Refills: 0 | Status: SHIPPED | OUTPATIENT
Start: 2020-05-12 | End: 2020-05-17

## 2020-05-12 RX ORDER — FENTANYL CITRATE 50 UG/ML
25 INJECTION, SOLUTION INTRAMUSCULAR; INTRAVENOUS EVERY 5 MIN PRN
Status: DISCONTINUED | OUTPATIENT
Start: 2020-05-12 | End: 2020-05-12 | Stop reason: HOSPADM

## 2020-05-12 RX ORDER — SODIUM CHLORIDE 9 MG/ML
INJECTION, SOLUTION INTRAVENOUS CONTINUOUS
Status: DISCONTINUED | OUTPATIENT
Start: 2020-05-12 | End: 2020-05-12 | Stop reason: HOSPADM

## 2020-05-12 RX ORDER — SODIUM CHLORIDE 0.9 % (FLUSH) 0.9 %
10 SYRINGE (ML) INJECTION PRN
Status: DISCONTINUED | OUTPATIENT
Start: 2020-05-12 | End: 2020-05-12 | Stop reason: HOSPADM

## 2020-05-12 RX ORDER — FENTANYL CITRATE 50 UG/ML
INJECTION, SOLUTION INTRAMUSCULAR; INTRAVENOUS
Status: DISCONTINUED
Start: 2020-05-12 | End: 2020-05-12 | Stop reason: HOSPADM

## 2020-05-12 RX ORDER — MEPERIDINE HYDROCHLORIDE 25 MG/ML
INJECTION INTRAMUSCULAR; INTRAVENOUS; SUBCUTANEOUS
Status: DISCONTINUED
Start: 2020-05-12 | End: 2020-05-12 | Stop reason: HOSPADM

## 2020-05-12 RX ORDER — DOCUSATE SODIUM 100 MG/1
100 CAPSULE, LIQUID FILLED ORAL 2 TIMES DAILY
Qty: 30 CAPSULE | Refills: 0 | Status: SHIPPED | OUTPATIENT
Start: 2020-05-12 | End: 2020-05-27

## 2020-05-12 RX ORDER — DEXAMETHASONE SODIUM PHOSPHATE 10 MG/ML
INJECTION, SOLUTION INTRAMUSCULAR; INTRAVENOUS PRN
Status: DISCONTINUED | OUTPATIENT
Start: 2020-05-12 | End: 2020-05-12 | Stop reason: SDUPTHER

## 2020-05-12 RX ORDER — BUPIVACAINE HYDROCHLORIDE AND EPINEPHRINE 2.5; 5 MG/ML; UG/ML
INJECTION, SOLUTION EPIDURAL; INFILTRATION; INTRACAUDAL; PERINEURAL PRN
Status: DISCONTINUED | OUTPATIENT
Start: 2020-05-12 | End: 2020-05-12 | Stop reason: ALTCHOICE

## 2020-05-12 RX ORDER — NEOSTIGMINE METHYLSULFATE 1 MG/ML
INJECTION, SOLUTION INTRAVENOUS PRN
Status: DISCONTINUED | OUTPATIENT
Start: 2020-05-12 | End: 2020-05-12 | Stop reason: SDUPTHER

## 2020-05-12 RX ORDER — ONDANSETRON 2 MG/ML
INJECTION INTRAMUSCULAR; INTRAVENOUS PRN
Status: DISCONTINUED | OUTPATIENT
Start: 2020-05-12 | End: 2020-05-12 | Stop reason: SDUPTHER

## 2020-05-12 RX ORDER — FENTANYL CITRATE 50 UG/ML
50 INJECTION, SOLUTION INTRAMUSCULAR; INTRAVENOUS EVERY 5 MIN PRN
Status: DISCONTINUED | OUTPATIENT
Start: 2020-05-12 | End: 2020-05-12 | Stop reason: HOSPADM

## 2020-05-12 RX ORDER — LIDOCAINE HYDROCHLORIDE 20 MG/ML
INJECTION, SOLUTION INTRAVENOUS PRN
Status: DISCONTINUED | OUTPATIENT
Start: 2020-05-12 | End: 2020-05-12 | Stop reason: SDUPTHER

## 2020-05-12 RX ORDER — PROPOFOL 10 MG/ML
INJECTION, EMULSION INTRAVENOUS PRN
Status: DISCONTINUED | OUTPATIENT
Start: 2020-05-12 | End: 2020-05-12 | Stop reason: SDUPTHER

## 2020-05-12 RX ORDER — ONDANSETRON 2 MG/ML
4 INJECTION INTRAMUSCULAR; INTRAVENOUS
Status: DISCONTINUED | OUTPATIENT
Start: 2020-05-12 | End: 2020-05-12 | Stop reason: HOSPADM

## 2020-05-12 RX ORDER — MIDAZOLAM HYDROCHLORIDE 1 MG/ML
INJECTION INTRAMUSCULAR; INTRAVENOUS PRN
Status: DISCONTINUED | OUTPATIENT
Start: 2020-05-12 | End: 2020-05-12 | Stop reason: SDUPTHER

## 2020-05-12 RX ORDER — SODIUM CHLORIDE 0.9 % (FLUSH) 0.9 %
10 SYRINGE (ML) INJECTION EVERY 12 HOURS SCHEDULED
Status: DISCONTINUED | OUTPATIENT
Start: 2020-05-12 | End: 2020-05-12 | Stop reason: HOSPADM

## 2020-05-12 RX ORDER — FENTANYL CITRATE 50 UG/ML
INJECTION, SOLUTION INTRAMUSCULAR; INTRAVENOUS PRN
Status: DISCONTINUED | OUTPATIENT
Start: 2020-05-12 | End: 2020-05-12 | Stop reason: SDUPTHER

## 2020-05-12 RX ORDER — LABETALOL HYDROCHLORIDE 5 MG/ML
INJECTION, SOLUTION INTRAVENOUS PRN
Status: DISCONTINUED | OUTPATIENT
Start: 2020-05-12 | End: 2020-05-12 | Stop reason: SDUPTHER

## 2020-05-12 RX ADMIN — Medication 3 MG: at 08:37

## 2020-05-12 RX ADMIN — FENTANYL CITRATE 150 MCG: 50 INJECTION, SOLUTION INTRAMUSCULAR; INTRAVENOUS at 07:57

## 2020-05-12 RX ADMIN — PROPOFOL 200 MG: 10 INJECTION, EMULSION INTRAVENOUS at 07:57

## 2020-05-12 RX ADMIN — IPRATROPIUM BROMIDE AND ALBUTEROL SULFATE 1 AMPULE: 2.5; .5 SOLUTION RESPIRATORY (INHALATION) at 09:53

## 2020-05-12 RX ADMIN — ROCURONIUM BROMIDE 40 MG: 10 SOLUTION INTRAVENOUS at 07:57

## 2020-05-12 RX ADMIN — Medication 0.2 MG: at 08:09

## 2020-05-12 RX ADMIN — LABETALOL HYDROCHLORIDE 5 MG: 5 INJECTION INTRAVENOUS at 08:39

## 2020-05-12 RX ADMIN — LIDOCAINE HYDROCHLORIDE 100 MG: 20 INJECTION, SOLUTION INTRAVENOUS at 07:57

## 2020-05-12 RX ADMIN — MIDAZOLAM 2 MG: 1 INJECTION INTRAMUSCULAR; INTRAVENOUS at 07:45

## 2020-05-12 RX ADMIN — FENTANYL CITRATE 100 MCG: 50 INJECTION, SOLUTION INTRAMUSCULAR; INTRAVENOUS at 08:20

## 2020-05-12 RX ADMIN — PHENYLEPHRINE HYDROCHLORIDE 200 MCG: 10 INJECTION INTRAVENOUS at 08:09

## 2020-05-12 RX ADMIN — DEXAMETHASONE SODIUM PHOSPHATE 10 MG: 10 INJECTION, SOLUTION INTRAMUSCULAR; INTRAVENOUS at 07:57

## 2020-05-12 RX ADMIN — Medication 0.4 MG: at 08:37

## 2020-05-12 RX ADMIN — MEPERIDINE HYDROCHLORIDE 25 MG: 25 INJECTION, SOLUTION INTRAMUSCULAR; INTRAVENOUS; SUBCUTANEOUS at 09:50

## 2020-05-12 RX ADMIN — SODIUM CHLORIDE: 9 INJECTION, SOLUTION INTRAVENOUS at 06:35

## 2020-05-12 RX ADMIN — CEFAZOLIN SODIUM 2 G: 2 SOLUTION INTRAVENOUS at 07:51

## 2020-05-12 RX ADMIN — FENTANYL CITRATE 50 MCG: 50 INJECTION, SOLUTION INTRAMUSCULAR; INTRAVENOUS at 09:24

## 2020-05-12 RX ADMIN — SODIUM CHLORIDE: 9 INJECTION, SOLUTION INTRAVENOUS at 07:03

## 2020-05-12 RX ADMIN — LABETALOL HYDROCHLORIDE 5 MG: 5 INJECTION INTRAVENOUS at 08:41

## 2020-05-12 RX ADMIN — ONDANSETRON 4 MG: 2 INJECTION INTRAMUSCULAR; INTRAVENOUS at 07:57

## 2020-05-12 ASSESSMENT — PAIN - FUNCTIONAL ASSESSMENT
PAIN_FUNCTIONAL_ASSESSMENT: ACTIVITIES ARE NOT PREVENTED
PAIN_FUNCTIONAL_ASSESSMENT: 0-10

## 2020-05-12 ASSESSMENT — PULMONARY FUNCTION TESTS
PIF_VALUE: 28
PIF_VALUE: 37
PIF_VALUE: 29
PIF_VALUE: 27
PIF_VALUE: 1
PIF_VALUE: 28
PIF_VALUE: 27
PIF_VALUE: 28
PIF_VALUE: 19
PIF_VALUE: 28
PIF_VALUE: 33
PIF_VALUE: 22
PIF_VALUE: 51
PIF_VALUE: 26
PIF_VALUE: 35
PIF_VALUE: 21
PIF_VALUE: 30
PIF_VALUE: 27
PIF_VALUE: 0
PIF_VALUE: 27
PIF_VALUE: 20
PIF_VALUE: 28
PIF_VALUE: 27
PIF_VALUE: 23
PIF_VALUE: 28
PIF_VALUE: 20
PIF_VALUE: 28
PIF_VALUE: 21
PIF_VALUE: 0
PIF_VALUE: 37
PIF_VALUE: 31
PIF_VALUE: 19
PIF_VALUE: 22
PIF_VALUE: 21
PIF_VALUE: 28
PIF_VALUE: 21
PIF_VALUE: 19
PIF_VALUE: 27
PIF_VALUE: 28
PIF_VALUE: 21
PIF_VALUE: 29
PIF_VALUE: 33
PIF_VALUE: 1
PIF_VALUE: 0
PIF_VALUE: 2
PIF_VALUE: 20
PIF_VALUE: 38
PIF_VALUE: 22
PIF_VALUE: 29
PIF_VALUE: 28
PIF_VALUE: 29
PIF_VALUE: 28
PIF_VALUE: 17
PIF_VALUE: 28
PIF_VALUE: 20
PIF_VALUE: 24
PIF_VALUE: 27
PIF_VALUE: 28
PIF_VALUE: 31
PIF_VALUE: 37

## 2020-05-12 ASSESSMENT — PAIN SCALES - GENERAL
PAINLEVEL_OUTOF10: 6
PAINLEVEL_OUTOF10: 6
PAINLEVEL_OUTOF10: 0
PAINLEVEL_OUTOF10: 5
PAINLEVEL_OUTOF10: 8
PAINLEVEL_OUTOF10: 0

## 2020-05-12 ASSESSMENT — PAIN DESCRIPTION - PROGRESSION: CLINICAL_PROGRESSION: NOT CHANGED

## 2020-05-12 ASSESSMENT — ENCOUNTER SYMPTOMS
SHORTNESS OF BREATH: 1
DYSPNEA ACTIVITY LEVEL: AFTER AMBULATING 1 FLIGHT OF STAIRS

## 2020-05-12 ASSESSMENT — COPD QUESTIONNAIRES: CAT_SEVERITY: MODERATE

## 2020-05-12 ASSESSMENT — PAIN DESCRIPTION - DESCRIPTORS
DESCRIPTORS: PRESSURE
DESCRIPTORS: TENDER;PRESSURE

## 2020-05-12 ASSESSMENT — PAIN DESCRIPTION - LOCATION
LOCATION: ABDOMEN
LOCATION: ABDOMEN

## 2020-05-12 ASSESSMENT — PAIN DESCRIPTION - PAIN TYPE: TYPE: SURGICAL PAIN

## 2020-05-12 ASSESSMENT — PAIN DESCRIPTION - FREQUENCY: FREQUENCY: INTERMITTENT

## 2020-05-12 NOTE — OP NOTE
omentum as well as transverse colon and duodenum off the infundibulum identifying the cystic duct junction. The cystic duct was skeletonized. The cystic artery was also identified ands skeletonized confirming it was leading directly into the gallbladder. The critical view was obtained. The 10mm titanium clip applier was used to place a single clip on the gallbladder side of the cystic duct. 2 clips proximally on the patient side and one distal on the gallbladder side of the cystic artery. A ductotomy was perfomed in the cystic duct and the Mixter catheter was inserted and clamped in place with the ledesma clamp. The catheter was flushed with saline first. It flushed well. A cholangiogram was performed with full strength dye showing full arborization of the intrahepatic biliary tree, common hepatic duct and cystic duct confluence into the common bile duct. Delayed imaging showed spillage of contrast into the small bowel. Our perceived anatomy was confirmed with the cholangiogram. No obstruction was appreciated. The catheter was removed, two clips were place on the patient side of the cystic duct. Pause was taken again to identify the critical view. The cystic duct was transected with laparoscopic scissors. Two clips were placed on the patient side, 1 distal on the gallbladder side of the cystic duct and it was transected with laparoscopic scissors and electrocautery. Electrocautery was then used to dissect the gallbladder from the gallbladder fossa. Traction-countertraction technique was used to expose the medial and lateral aspects of the gallbladder. Gallbladder was completely dissected off the gallbladder fossa, placed in a laparoscopic bag, and retracted through the 12-mm port site. We then inspected our clip sites which were intact. Suction irrigation was undertaken to clear out the abdomen and it was suctioned until clear. One liter of fluids was used to irrigate.  The gallbladder fossa was dry at this point

## 2020-05-12 NOTE — ANESTHESIA POSTPROCEDURE EVALUATION
Department of Anesthesiology  Postprocedure Note    Patient: Georgi Bergman  MRN: [id number]  YOB: 1955  Date of evaluation: 5/12/2020  Time:  10:18 AM     Procedure Summary     Date:  05/12/20 Room / Location:  [address] [place] [address] 06 / [address] [facility]    Anesthesia Start:  0925 Anesthesia Stop:  0900    Procedure:  CHOLECYSTECTOMY LAPAROSCOPIC WITH IOC (N/A Abdomen) Diagnosis:  (SYMPTOMATIC CHOLELITHIASIS)    Surgeon:  Kellen Poole MD Responsible Provider:  Kvng Harrison MD    Anesthesia Type:  general ASA Status:  3          Anesthesia Type: general    Hillary Phase I: Hillary Score: 8    Hillary Phase II:      Last vitals: Reviewed and per EMR flowsheets.        Anesthesia Post Evaluation    Patient location during evaluation: PACU  Patient participation: complete - patient participated  Level of consciousness: awake and alert  Pain score: 3  Airway patency: patent  Nausea & Vomiting: no nausea  Complications: no  Cardiovascular status: blood pressure returned to baseline  Respiratory status: acceptable  Hydration status: euvolemic

## 2020-05-12 NOTE — ANESTHESIA PRE PROCEDURE
0642   BP: (!) 116/56   Pulse: 71   Resp: 16   Temp: 36.2 °C (97.1 °F)   TempSrc: Temporal   SpO2: 95%   Weight: 202 lb (91.6 kg)   Height: 5' 1.5\" (1.562 m)                                              BP Readings from Last 3 Encounters:   05/12/20 (!) 116/56   03/11/20 (!) 97/51   03/02/20 (!) 112/48       NPO Status: Time of last liquid consumption: 2300                        Time of last solid consumption: 1900                        Date of last liquid consumption: 05/11/20                        Date of last solid food consumption: 05/11/20    BMI:   Wt Readings from Last 3 Encounters:   05/12/20 202 lb (91.6 kg)   03/16/20 204 lb (92.5 kg)   03/11/20 204 lb (92.5 kg)     Body mass index is 37.55 kg/m². CBC:   Lab Results   Component Value Date    WBC 9.3 05/12/2020    RBC 3.85 05/12/2020    HGB 12.0 05/12/2020    HCT 35.3 05/12/2020    MCV 91.7 05/12/2020    RDW 12.7 05/12/2020     05/12/2020       CMP:   Lab Results   Component Value Date     03/11/2020    K 4.7 03/11/2020    CL 95 03/11/2020    CO2 25 03/11/2020    BUN 28 03/11/2020    CREATININE 1.2 03/11/2020    GFRAA 55 03/11/2020    LABGLOM 45 03/11/2020    GLUCOSE 100 03/11/2020    GLUCOSE 107 03/09/2012    PROT 7.2 01/23/2020    CALCIUM 10.7 03/11/2020    BILITOT 0.4 01/23/2020    ALKPHOS 88 01/23/2020    AST 14 01/23/2020    ALT 11 01/23/2020       POC Tests: No results for input(s): POCGLU, POCNA, POCK, POCCL, POCBUN, POCHEMO, POCHCT in the last 72 hours.     Coags:   Lab Results   Component Value Date    PROTIME 11.2 10/10/2013    INR 1.0 10/10/2013    APTT 28.0 10/10/2013       HCG (If Applicable): No results found for: PREGTESTUR, PREGSERUM, HCG, HCGQUANT     ABGs: No results found for: PHART, PO2ART, EWE9VFY, NPB6SIW, BEART, [id number]     Type & Screen (If Applicable):  No results found for: LABABO, LABRH    Drug/Infectious Status (If Applicable):  No results found for: HIV, HEPCAB    COVID-19 Screening (If Applicable):   Lab chest pain as of yesterday evening 5/11/20 after a meal but subsided within 15 minutes    Pt denies any palpitations     Neuro/Psych:   (+) neuromuscular disease (Diabetic neuropathy):, psychiatric history (Depression):             ROS comment: Paresthesias of bilat forearms, hands, and feet per pt report  GI/Hepatic/Renal:   (+) GERD: well controlled, renal disease (CKD stage III):,           Endo/Other:    (+) DiabetesType II DM, no interval change, , hypothyroidism: arthritis: OA., .                 Abdominal:   (+) obese,         Vascular:           ROS comment: Pt denies . Anesthesia Plan      general     ASA 3       Induction: intravenous. BIS  MIPS: Postoperative opioids intended and Prophylactic antiemetics administered. Anesthetic plan and risks discussed with patient. Use of blood products discussed with patient whom consented to blood products. Plan discussed with CRNA and attending.                   Bunny Solo RN   5/12/2020

## 2020-05-12 NOTE — H&P
Bariatric Follow Up Note  [facility] or Radha Enriquez MD, [place]    Patient's Name/Date of Birth: Kavitha Garza / 1955    Date: May 12, 2020     Surgeon: Cisco Menendez MD    Chief Complaint: Vitamin Deficiency, GERD, nausea, bloating    Patient Active Problem List   Diagnosis    Gastroesophageal reflux disease without esophagitis    Type 2 diabetes mellitus with stage 3 chronic kidney disease, without long-term current use of insulin ([facility] [address]    Essential hypertension    Mixed hyperlipidemia    Chronic obstructive pulmonary disease ([address]    Moderate obesity    Exertional dyspnea       HPI:   Kavitha Garza is a 62 y.o. female who presents for evaluation of right upper quadrant pain, symptomatic gallstones. Visit today for Vit D def which is now normal. Now with more pain, nausea, bloating almost daily. Timing is intermittent, radiation to back, alleviated by npo and started several weeks ago.  Denies SOB, chest pain, fever, chills, diarrhea, constipation      Past Medical History:   Diagnosis Date    Arthritis     back, cervical neck    Asthma     Breast mass     Chronic sinusitis     Colon polyps     COPD (chronic obstructive pulmonary disease) (HCC)     Depression     Diabetes mellitus (HCC)     Diabetic polyneuropathy (HCC)     Diverticulitis     Diverticulosis     GERD (gastroesophageal reflux disease)     History of cardiovascular stress test 6/12/2014    lexiscan    Hyperlipidemia     Hypertension     Mitral valve prolapse     Neuropathy     Overactive thyroid gland     Rapid heartbeat     Shortness of breath     Sleep apnea     Uses cpap    Sleep apnea     Thyroid disease        Past Surgical History:   Procedure Laterality Date    ABDOMINAL EXPLORATION SURGERY      APPENDECTOMY  1968    BRONCHOSCOPY  Oct 2013    Bronchoscopy and Mediastinoscopy    OVARY REMOVAL  10/1995    left    TONSILLECTOMY  1972    UPPER GASTROINTESTINAL ENDOSCOPY N/A shortness of breath, wheezing. Cardiovascular: syncope, chest pain, dyspnea on exertion, edema, irregular heartbeat,  palpitations. Gastrointestinal: abdominal pain, constipation, diarrhea,  decreased appetite, heartburn, hematemesis, melena, nausea, vomiting, stool incontinence, abnormal swallowing. Genito-Urinary: dysuria, hematuria, incontinence, frequency, urgency. Musculoskeletal: joint pain, stiffness, swelling, muscle pain, muscle  tenderness. Neurological: confusion, memory loss, dizziness, gait disturbance, headaches, impaired coordination, decreased balance, numbness/tingling, seizures, speech problems, tremors,weakness. Dermatological:  hair changes, nail changes, pruritu      Physical Exam: Respiratory rate was: normal   General: The patient is in no apparent distress. Body habitus is obese. HEENT: No rhinorrhea, sneezing, yawning, or lacrimation. No scleral icterus or conjunctival injection. SKIN: No piloerection. No track marks. No rash. No erythema or ecchymosis. Psychological: Mood and affect are appropriate. Hygiene appears appropriate. Cardiovascular: There is no edema. Respiratory: Respirations are regular and unlabored. There is no cyanosis. Gastrointestinal: Soft abdomen, non-tender. Genitourinary: No costovertebral angle tenderness. MSK: Spinal curvatures were normal in standing. Pelvis was level in standing. Active range of motion was full. There was no obvious joint effusion, deformity. Neurologic: Awake, alert and oriented in three planes. Speech is fluent. No facial weakness. Tongue is midline. Hearing is intact for conversation. Pupils are equal and round. Extraocular muscles appear intact during visual tracking. Shoulder shrug symmetric. Sensation: Intact for light touch (patient elicited) in all upper and lower extremity dermatomes.   Strength: Functional upper extremity strength testing was observed to be at least antigravity and lower extremity

## 2020-05-20 ENCOUNTER — TELEPHONE (OUTPATIENT)
Dept: BARIATRICS/WEIGHT MGMT | Age: 65
End: 2020-05-20

## 2020-05-20 NOTE — TELEPHONE ENCOUNTER
SW called PT regarding missed/cancelled appointment but PT was unavailable. Left v-mail requesting that the PT call to reschedule the appointment.      CATRINA Menjivar

## 2020-05-22 ENCOUNTER — VIRTUAL VISIT (OUTPATIENT)
Dept: BARIATRICS/WEIGHT MGMT | Age: 65
End: 2020-05-22
Payer: MEDICARE

## 2020-05-22 PROCEDURE — 99211 OFF/OP EST MAY X REQ PHY/QHP: CPT

## 2020-05-22 PROCEDURE — 99024 POSTOP FOLLOW-UP VISIT: CPT | Performed by: SURGERY

## 2020-09-02 ENCOUNTER — TELEPHONE (OUTPATIENT)
Dept: BARIATRICS/WEIGHT MGMT | Age: 65
End: 2020-09-02

## 2020-09-02 NOTE — TELEPHONE ENCOUNTER
Review of chart to get patient to weight loss surgery. Call placed to patient to discuss and answering machine on. Message left to recall. Offered to assist if I can.

## 2021-03-16 ENCOUNTER — IMMUNIZATION (OUTPATIENT)
Dept: PRIMARY CARE CLINIC | Age: 66
End: 2021-03-16
Payer: MEDICARE

## 2021-03-16 PROCEDURE — 0011A COVID-19, MODERNA VACCINE 100MCG/0.5ML DOSE: CPT | Performed by: NURSE PRACTITIONER

## 2021-03-16 PROCEDURE — 91301 COVID-19, MODERNA VACCINE 100MCG/0.5ML DOSE: CPT | Performed by: NURSE PRACTITIONER

## 2021-04-02 ENCOUNTER — OFFICE VISIT (OUTPATIENT)
Dept: FAMILY MEDICINE CLINIC | Age: 66
End: 2021-04-02
Payer: MEDICARE

## 2021-04-02 ENCOUNTER — TELEPHONE (OUTPATIENT)
Dept: FAMILY MEDICINE CLINIC | Age: 66
End: 2021-04-02

## 2021-04-02 VITALS
TEMPERATURE: 96.3 F | RESPIRATION RATE: 17 BRPM | OXYGEN SATURATION: 96 % | DIASTOLIC BLOOD PRESSURE: 68 MMHG | SYSTOLIC BLOOD PRESSURE: 118 MMHG | HEART RATE: 73 BPM | HEIGHT: 61 IN | BODY MASS INDEX: 44.18 KG/M2 | WEIGHT: 234 LBS

## 2021-04-02 DIAGNOSIS — M17.0 PRIMARY OSTEOARTHRITIS OF BOTH KNEES: Primary | ICD-10-CM

## 2021-04-02 DIAGNOSIS — M17.0 PRIMARY OSTEOARTHRITIS OF BOTH KNEES: ICD-10-CM

## 2021-04-02 DIAGNOSIS — M25.562 ACUTE PAIN OF LEFT KNEE: Primary | ICD-10-CM

## 2021-04-02 DIAGNOSIS — M17.11 ARTHRITIS OF RIGHT KNEE: ICD-10-CM

## 2021-04-02 PROCEDURE — 99213 OFFICE O/P EST LOW 20 MIN: CPT | Performed by: PHYSICIAN ASSISTANT

## 2021-04-02 PROCEDURE — 3017F COLORECTAL CA SCREEN DOC REV: CPT | Performed by: PHYSICIAN ASSISTANT

## 2021-04-02 PROCEDURE — 1036F TOBACCO NON-USER: CPT | Performed by: PHYSICIAN ASSISTANT

## 2021-04-02 PROCEDURE — G8427 DOCREV CUR MEDS BY ELIG CLIN: HCPCS | Performed by: PHYSICIAN ASSISTANT

## 2021-04-02 PROCEDURE — G8417 CALC BMI ABV UP PARAM F/U: HCPCS | Performed by: PHYSICIAN ASSISTANT

## 2021-04-02 ASSESSMENT — PATIENT HEALTH QUESTIONNAIRE - PHQ9
2. FEELING DOWN, DEPRESSED OR HOPELESS: 0
SUM OF ALL RESPONSES TO PHQ QUESTIONS 1-9: 0
1. LITTLE INTEREST OR PLEASURE IN DOING THINGS: 0
SUM OF ALL RESPONSES TO PHQ9 QUESTIONS 1 & 2: 0

## 2021-04-02 NOTE — TELEPHONE ENCOUNTER
Patient advised of xray results of her bilateral knees with osteoarthritis and bilateral medial joint narrowing and I will place consult to Dr. Ora Solorzano from orthopedics per patient request for consultation.     Radha Srinivasan PA-C

## 2021-04-02 NOTE — PROGRESS NOTES
negative responses for Review of Systems, constitutional, psych, eyes, ENT, cardiovascular, respiratory, gastrointestinal, neurological, genitourinary, musculoskeletal, integument systems and systems related to the presenting problem are either stated in the preceding or were not pertinent or were negative for the symptoms and/or complaints related to the medical problem.ruby. Past Medical History:   Past Surgical History:   Procedure Laterality Date    ABDOMINAL EXPLORATION SURGERY      APPENDECTOMY  1968    BRONCHOSCOPY  Oct 2013    Bronchoscopy and Mediastinoscopy    CHOLECYSTECTOMY, LAPAROSCOPIC N/A 5/12/2020    CHOLECYSTECTOMY LAPAROSCOPIC WITH IOC performed by Ernesto Marquis MD at [place] [address]  10/1995    left    TONSILLECTOMY  1972    UPPER GASTROINTESTINAL ENDOSCOPY N/A 1/23/2020    EGD BIOPSY performed by Ernesto Marquis MD at [address] [place] [address] Drive History:  reports that she quit smoking about 7 years ago. Her smoking use included cigarettes. She has a 180.00 pack-year smoking history. She has never used smokeless tobacco. She reports that she does not drink alcohol or use drugs. Family History: family history includes Cancer in her father, maternal grandfather, maternal grandmother, and mother; No Known Problems in her paternal grandfather and paternal grandmother. Allergies: Food, Milk-related compounds, Other, and Codeine    Physical Exam         VS:  /68 (Site: Left Upper Arm, Position: Sitting, Cuff Size: Large Adult)   Pulse 73   Temp 96.3 °F (35.7 °C)   Resp 17   Ht 5' 1\" (1.549 m)   Wt 234 lb (106.1 kg)   LMP 01/01/1993   SpO2 96%   BMI 44.21 kg/m²     Oxygen Saturation Interpretation: Normal.    Constitutional:  Alert, development consistent with age. Lungs: Clear to ausculation bilaterally  CV: Regular rhythm, no ectopy. Knee: Inspection: Left knee without obvious deformity. Tenderness:   Moderate to severe tenderness to medial joint line of left patellar margin as well as along medial collateral ligamentous region. Swelling/Effusion: Some mild edema noted over inferior patellar margin with some mild crepitance on palpation of patella. Deformity: No obvious deformity. ROM: ROM 0º-120º with mild to moderate discomfort with full extension. Skin:  No visible bruising noted. No abrasions, rashes, or erythema noted. Valgus/Varus Stress: Some pain with  Varus/Valgus stress sign       Hip:            Tenderness:  No TTP.              ROM: FROM hip without pain. Joint(s) Below: No calf/ankle/foot                Tenderness:  No TTP over calf, ankle, or foot. No edema noted. Negative Zina's sign. ROM: FROM without pain or deficits. Neurovascular:             Sensory deficit: Sensation intact above and below the injury site. Pulse deficit: Pulses 2+ and bounding. Capillary refill: Less then 2 sec throughout. Gait:  Slightly antalgic gait, but able to bear weight with mild difficulty. Lymphatics: No lymphangitis or adenopathy noted. Neurological:  Alert and oriented. Motor functions intact. Assessment / Plan     Impression(s):  [address] [place] [id number] was seen today for knee pain. Diagnoses and all orders for this visit:    Acute pain of left knee  -     Cancel: XR KNEE LEFT (3 VIEWS); Future  -     XR KNEE LEFT (1-2 VIEWS); Future    Arthritis of right knee  -     Cancel: XR KNEE LEFT (3 VIEWS); Future  -     Cancel: XR KNEE RIGHT (3 VIEWS); Future  -     XR KNEE LEFT (1-2 VIEWS); Future  -     XR KNEE RIGHT (1-2 VIEWS); Future    Primary osteoarthritis of both knees      Disposition:  Disposition: Discharge to Home. Order given for Xrays of bilateral knees, will call with results once available. Patient takes regular medications for her knee pain and arthritis. Offered weaning prednisone, but patient declined.      Knee exercises given for arthritisi     KUNAL protocol advised. F/u with PCP in 1-2 weeks if symptoms persist. Referral will be made to Dr. Stephanie Wilkes from Ortho per patient request.   ED sooner if symptoms worsen or change. ED immediately with any calf pain/swelling, severe/worsening knee pain, paresthesias, weakness, fever, CP, or SOB. Pt states understanding and is in agreement with this care plan. All questions answered.       Tessa Valenzuela PA-C

## 2021-04-02 NOTE — PATIENT INSTRUCTIONS
Patient Education        Knee Arthritis: Exercises  Introduction  Here are some examples of exercises for you to try. The exercises may be suggested for a condition or for rehabilitation. Start each exercise slowly. Ease off the exercises if you start to have pain. You will be told when to start these exercises and which ones will work best for you. How to do the exercises  Knee flexion with heel slide   1. Lie on your back with your knees bent. 2. Slide your heel back by bending your affected knee as far as you can. Then hook your other foot around your ankle to help pull your heel even farther back. 3. Hold for about 6 seconds, then rest for up to 10 seconds. 4. Repeat 8 to 12 times. 5. Switch legs and repeat steps 1 through 4, even if only one knee is sore. Quad sets   1. Sit with your affected leg straight and supported on the floor or a firm bed. Place a small, rolled-up towel under your knee. Your other leg should be bent, with that foot flat on the floor. 2. Tighten the thigh muscles of your affected leg by pressing the back of your knee down into the towel. 3. Hold for about 6 seconds, then rest for up to 10 seconds. 4. Repeat 8 to 12 times. 5. Switch legs and repeat steps 1 through 4, even if only one knee is sore. Straight-leg raises to the front   1. Lie on your back with your good knee bent so that your foot rests flat on the floor. Your affected leg should be straight. Make sure that your low back has a normal curve. You should be able to slip your hand in between the floor and the small of your back, with your palm touching the floor and your back touching the back of your hand. 2. Tighten the thigh muscles in your affected leg by pressing the back of your knee flat down to the floor. Hold your knee straight. 3. Keeping the thigh muscles tight and your leg straight, lift your affected leg up so that your heel is about 12 inches off the floor.  Hold for about 6 seconds, then lower treatment and safety. Be sure to make and go to all appointments, and call your doctor if you are having problems. It's also a good idea to know your test results and keep a list of the medicines you take. Where can you learn more? Go to [email or web address] org and sign in to your [employer] account. Enter C159 in the [address] box to learn more about \"Knee Arthritis: Exercises. \"     If you do not have an account, please click on the \"Sign Up Now\" link. Current as of: November 16, 2020               Content Version: 12.8  © 2006-2021 [employer]wise, Inc[employer]. Care instructions adapted under license by [facility] ([facility]). If you have questions about a medical condition or this instruction, always ask your healthcare professional. [address] any warranty or liability for your use of this information.

## 2021-04-13 ENCOUNTER — OFFICE VISIT (OUTPATIENT)
Dept: ORTHOPEDIC SURGERY | Age: 66
End: 2021-04-13
Payer: MEDICARE

## 2021-04-13 ENCOUNTER — IMMUNIZATION (OUTPATIENT)
Dept: PRIMARY CARE CLINIC | Age: 66
End: 2021-04-13
Payer: MEDICARE

## 2021-04-13 VITALS — HEIGHT: 61 IN | BODY MASS INDEX: 44.18 KG/M2 | WEIGHT: 234 LBS | TEMPERATURE: 98 F

## 2021-04-13 DIAGNOSIS — M17.11 PRIMARY OSTEOARTHRITIS OF RIGHT KNEE: Primary | ICD-10-CM

## 2021-04-13 DIAGNOSIS — M17.12 PRIMARY OSTEOARTHRITIS OF LEFT KNEE: ICD-10-CM

## 2021-04-13 PROCEDURE — 1036F TOBACCO NON-USER: CPT | Performed by: ORTHOPAEDIC SURGERY

## 2021-04-13 PROCEDURE — 91301 COVID-19, MODERNA VACCINE 100MCG/0.5ML DOSE: CPT | Performed by: NURSE PRACTITIONER

## 2021-04-13 PROCEDURE — 99204 OFFICE O/P NEW MOD 45 MIN: CPT | Performed by: ORTHOPAEDIC SURGERY

## 2021-04-13 PROCEDURE — 0012A COVID-19, MODERNA VACCINE 100MCG/0.5ML DOSE: CPT | Performed by: NURSE PRACTITIONER

## 2021-04-13 PROCEDURE — 20610 DRAIN/INJ JOINT/BURSA W/O US: CPT | Performed by: ORTHOPAEDIC SURGERY

## 2021-04-13 PROCEDURE — G8417 CALC BMI ABV UP PARAM F/U: HCPCS | Performed by: ORTHOPAEDIC SURGERY

## 2021-04-13 PROCEDURE — 3017F COLORECTAL CA SCREEN DOC REV: CPT | Performed by: ORTHOPAEDIC SURGERY

## 2021-04-13 PROCEDURE — G8427 DOCREV CUR MEDS BY ELIG CLIN: HCPCS | Performed by: ORTHOPAEDIC SURGERY

## 2021-04-13 RX ORDER — TRIAMCINOLONE ACETONIDE 40 MG/ML
40 INJECTION, SUSPENSION INTRA-ARTICULAR; INTRAMUSCULAR ONCE
Status: COMPLETED | OUTPATIENT
Start: 2021-04-13 | End: 2021-04-13

## 2021-04-13 RX ADMIN — TRIAMCINOLONE ACETONIDE 40 MG: 40 INJECTION, SUSPENSION INTRA-ARTICULAR; INTRAMUSCULAR at 15:19

## 2021-04-13 NOTE — PROGRESS NOTES
mg by mouth daily. , Disp: , Rfl:     lovastatin (MEVACOR) 40 MG tablet, Take 40 mg by mouth nightly., Disp: , Rfl:     metoprolol (LOPRESSOR) 25 MG tablet, Take 25 mg by mouth nightly., Disp: , Rfl:     metFORMIN (GLUCOPHAGE) 500 MG tablet, Take 500 mg by mouth 2 times daily (with meals). , Disp: , Rfl:     levothyroxine (SYNTHROID) 50 MCG tablet, Take 75 mcg by mouth Daily , Disp: , Rfl:     gabapentin (NEURONTIN) 600 MG tablet, Take 600 mg by mouth 3 times daily. , Disp: , Rfl:     sertraline (ZOLOFT) 100 MG tablet, Take 100 mg by mouth nightly. Takes 1and 1/2 tablets nightly, Disp: , Rfl:     albuterol (PROVENTIL HFA;VENTOLIN HFA) 108 (90 BASE) MCG/ACT inhaler, Inhale 2 puffs into the lungs every 6 hours as needed. , Disp: , Rfl:     aspirin 81 MG tablet, Take 81 mg by mouth daily. , Disp: , Rfl:   Allergies   Allergen Reactions    Food Other (See Comments)     Lactose Intolerance - Milk and Ice Cream Only    Milk-Related Compounds Other (See Comments)     Intolerance      Other      Enviromental      Codeine Nausea And Vomiting     itching     Social History     Socioeconomic History    Marital status:      Spouse name: Not on file    Number of children: Not on file    Years of education: Not on file    Highest education level: Not on file   Occupational History    Not on file   Social Needs    Financial resource strain: Not on file    Food insecurity     Worry: Not on file     Inability: Not on file    Transportation needs     Medical: Not on file     Non-medical: Not on file   Tobacco Use    Smoking status: Former Smoker     Packs/day: 4.00     Years: 45.00     Pack years: 180.00     Types: Cigarettes     Quit date: 2013     Years since quittin.5    Smokeless tobacco: Never Used    Tobacco comment: Uses E-cig   Substance and Sexual Activity    Alcohol use: No     Comment: 8 cans pop daily    Drug use: No    Sexual activity: Yes   Lifestyle    Physical activity     Days per week: Not on file     Minutes per session: Not on file    Stress: Not on file   Relationships    Social connections     Talks on phone: Not on file     Gets together: Not on file     Attends [employer] service: Not on file     Active member of club or organization: Not on file     Attends meetings of clubs or organizations: Not on file     Relationship status: Not on file    Intimate partner violence     Fear of current or ex partner: Not on file     Emotionally abused: Not on file     Physically abused: Not on file     Forced sexual activity: Not on file   Other Topics Concern    Not on file   Social History Narrative    Not on file     Family History   Problem Relation Age of Onset    Cancer Mother     Cancer Father     Cancer Maternal Grandmother     Cancer Maternal Grandfather     No Known Problems Paternal Grandfather     No Known Problems Paternal Grandmother          REVIEW OF SYSTEMS:     General/Constitution:  (-)weight loss, (-)fever, (-)chills, (-)weakness. Skin: (-) rash,(-) psoriasis,(-) eczema, (-)skin cancer. Musculoskeletal: (-) fractures,  (-) dislocations,(-) collagen vascular disease, (-) fibromyalgia, (-) multiple sclerosis, (-) muscular dystrophy, (-) RSD,(-) joint pain (-)swelling, (-) joint pain,swelling. Neurologic: (-) epilepsy, (-)seizures,(-) brain tumor,(-) TIA, (-)stroke, (-)headaches, (-)Parkinson disease,(-) memory loss, (-) LOC. Cardiovascular: (-) Chest pain, (-) swelling in legs/feet, (-) SOB, (-) cramping in legs/feet with walking. Respiratory: (-) SOB, (-) Coughing, (-) night sweats. GI: (-) nausea, (-) vomiting, (-) diarrhea, (-) blood in stool, (-) gastric ulcer. Psychiatric: (-) Depression, (-) Anxiety, (-) bipolar disease, (-) Alzheimer's Disease  Allergic/Immunologic: (-) allergies latex, (-) allergies metal, (-) skin sensitivity.   Hematlogic: (-) anemia, (-) blood transfusion, (-) DVT/PE, (-) Clotting disorders    Subjective:    Constitution:  Temp 98 °F (36.7 °C)   Ht 5' 1\" (1.549 m)   Wt 234 lb (106.1 kg)   LMP 01/01/1993   BMI 44.21 kg/m²     Psycihatric:  The patient is alert and oriented x 3, appears to be stated age and in no distress. Respiratory:  Respiratory effort is not labored. Patient is not gasping. Palpation of the chest reveals no tactile fremitus. Skin:  Upon inspection: the skin appears warm, dry and intact. There is  a previous scar over the affected area. There is any cellulitis, lymphedema or cutaneous lesions noted in the lower extremities. Upon palpation there is no induration noted. Neurologic:  Gait: antalgic; Motor exam of the lower extremities show ; quadriceps, hamstrings, foot dorsi and plantar flexors intact R.  5/5 and L. 5/5. Deep tendon reflexes are 2/4 at the knees and 2/4 at the ankles with strong extensor hallicus longus motor strength bilaterally. Sensory to both feet is intact to all sensory roots. Cardiovascular: The vascular exam is normal and is well perfused to distal extremities. Distal pulses DP/PT: R. 2+; L. 2+. There is cap refill noted less than two seconds in all digits. There is not edema of the bilateral lower extremities. There is not varicosities noted in the distal extremities. Lymph:  Upon palpation,  there is no lymphadenopathy noted in bilateral lower extremities. Musculoskeletal:  Gait: antalgic; examination of the nails and digits reveal no cyanosis or clubbing. Lumbar exam:  On visual inspection, there is not deformity of the spine. full range of motion, no tenderness, palpable spasm or pain on motion. Special tests: Straight Leg Raise negative, Sunday test negative. Hip exam:   Upon inspection, there is not deformity noted. Upon palpation there is not tenderness. ROM: is  full and symmetrical.   Strength: Hip Flexors 5/5; Hip Abductors 5/5; Hip Adduction 5/5.      Knee exam:  Bilateral knee exam shows;  range of motion of R. Knee is 0 to 115, and L. Knee

## 2021-04-16 ENCOUNTER — TELEPHONE (OUTPATIENT)
Dept: ORTHOPEDIC SURGERY | Age: 66
End: 2021-04-16

## 2021-04-20 ENCOUNTER — TELEPHONE (OUTPATIENT)
Dept: ORTHOPEDIC SURGERY | Age: 66
End: 2021-04-20

## 2021-04-27 ENCOUNTER — TELEPHONE (OUTPATIENT)
Dept: ORTHOPEDIC SURGERY | Age: 66
End: 2021-04-27

## 2021-04-27 NOTE — TELEPHONE ENCOUNTER
Pt called with concerns that her injection for Synvisc is not covered according to a letter she received from her insurance company. I let her know that there is a letter in her chart that no PA is required. She is still requesting a call from the office to confirm.  [phone]

## 2021-05-06 ENCOUNTER — OFFICE VISIT (OUTPATIENT)
Dept: ORTHOPEDIC SURGERY | Age: 66
End: 2021-05-06
Payer: MEDICARE

## 2021-05-06 DIAGNOSIS — M17.12 PRIMARY OSTEOARTHRITIS OF LEFT KNEE: ICD-10-CM

## 2021-05-06 DIAGNOSIS — M17.11 PRIMARY OSTEOARTHRITIS OF RIGHT KNEE: Primary | ICD-10-CM

## 2021-05-06 PROCEDURE — 20610 DRAIN/INJ JOINT/BURSA W/O US: CPT | Performed by: ORTHOPAEDIC SURGERY

## 2021-05-07 NOTE — PROGRESS NOTES
Chief Complaint   Patient presents with    Injections     bilateral knee Synvisc #1       Verbal and written consent was obtained by the patient. The following is a well known to me that is here for bilateral knee injections. They are here for  Synvisc  # 1. Her knees were prepped in sterile fashion. Synvisc 16mg injected to Bilateral knees. The patient tolerated the injections well and I will see the patient back in 1 week for repeat injections. Tonio Watson was seen today for injections.     Diagnoses and all orders for this visit:    Primary osteoarthritis of right knee    Primary osteoarthritis of left knee

## 2021-05-13 ENCOUNTER — NURSE ONLY (OUTPATIENT)
Dept: ORTHOPEDIC SURGERY | Age: 66
End: 2021-05-13
Payer: MEDICARE

## 2021-05-13 DIAGNOSIS — M17.12 PRIMARY OSTEOARTHRITIS OF LEFT KNEE: ICD-10-CM

## 2021-05-13 DIAGNOSIS — M17.11 PRIMARY OSTEOARTHRITIS OF RIGHT KNEE: Primary | ICD-10-CM

## 2021-05-13 PROCEDURE — 20610 DRAIN/INJ JOINT/BURSA W/O US: CPT | Performed by: NURSE PRACTITIONER

## 2021-05-13 NOTE — PROGRESS NOTES
Chief Complaint   Patient presents with    Injections     b/l synvisc #2       Verbal and written consent was obtained by the patient. The following is a well known to me that is here for bilateral knee injections. They are here for  Synvisc  # 2. Her knees were prepped in sterile fashion. Synvisc 16mg injected to Bilateral knees. The patient tolerated the injections well and I will see the patient back in 1 week for repeat injections. Guadalupe Chand was seen today for injections.     Diagnoses and all orders for this visit:    Primary osteoarthritis of right knee  -     20610 - AR DRAIN/INJECT LARGE JOINT/BURSA    Primary osteoarthritis of left knee  -     20610 - AR DRAIN/INJECT LARGE JOINT/BURSA    Other orders  -     Hylan injection 16 mg  -     Hylan injection 16 mg

## 2021-05-19 ENCOUNTER — NURSE ONLY (OUTPATIENT)
Dept: ORTHOPEDIC SURGERY | Age: 66
End: 2021-05-19
Payer: MEDICARE

## 2021-05-19 DIAGNOSIS — M17.12 PRIMARY OSTEOARTHRITIS OF LEFT KNEE: ICD-10-CM

## 2021-05-19 DIAGNOSIS — M17.11 PRIMARY OSTEOARTHRITIS OF RIGHT KNEE: Primary | ICD-10-CM

## 2021-05-19 PROCEDURE — 20610 DRAIN/INJ JOINT/BURSA W/O US: CPT | Performed by: NURSE PRACTITIONER

## 2021-05-19 NOTE — PROGRESS NOTES
Chief Complaint   Patient presents with    Injections     b/l synvisc #3       Verbal and written consent was obtained by the patient. The following is a well known to me that is here for bilateral knee injections. They are here for  Synvisc  # 3. Her knees were prepped in sterile fashion. Synvisc 16mg injected to Bilateral knees. The patient tolerated the injections well and I will see the patient back in 1 week for repeat injections. Ilda Rojas was seen today for injections.     Diagnoses and all orders for this visit:    Primary osteoarthritis of right knee  -     20610 - MS DRAIN/INJECT LARGE JOINT/BURSA    Primary osteoarthritis of left knee  -     22050 - MS DRAIN/INJECT LARGE JOINT/BURSA    Other orders  -     Hylan injection 16 mg  -     Hylan injection 16 mg

## 2021-05-26 DIAGNOSIS — M25.552 BILATERAL HIP PAIN: Primary | ICD-10-CM

## 2021-05-26 DIAGNOSIS — M25.551 BILATERAL HIP PAIN: Primary | ICD-10-CM

## 2021-06-01 ENCOUNTER — OFFICE VISIT (OUTPATIENT)
Dept: ORTHOPEDIC SURGERY | Age: 66
End: 2021-06-01
Payer: MEDICARE

## 2021-06-01 VITALS — WEIGHT: 234 LBS | BODY MASS INDEX: 44.18 KG/M2 | HEIGHT: 61 IN

## 2021-06-01 DIAGNOSIS — M70.62 TROCHANTERIC BURSITIS, LEFT HIP: Primary | ICD-10-CM

## 2021-06-01 DIAGNOSIS — M70.61 GREATER TROCHANTERIC BURSITIS OF RIGHT HIP: ICD-10-CM

## 2021-06-01 PROCEDURE — 3017F COLORECTAL CA SCREEN DOC REV: CPT | Performed by: ORTHOPAEDIC SURGERY

## 2021-06-01 PROCEDURE — 1036F TOBACCO NON-USER: CPT | Performed by: ORTHOPAEDIC SURGERY

## 2021-06-01 PROCEDURE — 20610 DRAIN/INJ JOINT/BURSA W/O US: CPT | Performed by: ORTHOPAEDIC SURGERY

## 2021-06-01 PROCEDURE — G8427 DOCREV CUR MEDS BY ELIG CLIN: HCPCS | Performed by: ORTHOPAEDIC SURGERY

## 2021-06-01 PROCEDURE — 99214 OFFICE O/P EST MOD 30 MIN: CPT | Performed by: ORTHOPAEDIC SURGERY

## 2021-06-01 PROCEDURE — G8417 CALC BMI ABV UP PARAM F/U: HCPCS | Performed by: ORTHOPAEDIC SURGERY

## 2021-06-01 RX ORDER — TRIAMCINOLONE ACETONIDE 40 MG/ML
40 INJECTION, SUSPENSION INTRA-ARTICULAR; INTRAMUSCULAR ONCE
Status: COMPLETED | OUTPATIENT
Start: 2021-06-01 | End: 2021-06-01

## 2021-06-01 RX ADMIN — TRIAMCINOLONE ACETONIDE 40 MG: 40 INJECTION, SUSPENSION INTRA-ARTICULAR; INTRAMUSCULAR at 14:34

## 2021-06-01 RX ADMIN — TRIAMCINOLONE ACETONIDE 40 MG: 40 INJECTION, SUSPENSION INTRA-ARTICULAR; INTRAMUSCULAR at 14:33

## 2021-06-01 NOTE — PROGRESS NOTES
Chief Complaint   Patient presents with    Hip Pain     Bilateral hip pain with right worse than left. Complains of lateral hip bilaterally. No previous treatment for the hips. had bilateral knee synvisc a few weeks ago. Luz Elena Kothari  Is a 60 y.o.  female who presents today complaining of bilateral hip pain. She states that the pain began 3  month(s) ago. She did not have a history of injury. Patients states pain is located lateral, over greater trochanter and has tenderness over the  Greater trochanter portion of the hip. Hip pain is described as aching and  is worse with weight bearing along with lateral discomfort. She states the pain occurs in the  continuous. Patient states hip pain is relieved by rest. Patient does not have a history of back pain. The patient does not use ambulatory aid.       Past Medical History:   Diagnosis Date    Arthritis     back, cervical neck    Asthma     Breast mass     Chronic sinusitis     Colon polyps     COPD (chronic obstructive pulmonary disease) (HCC)     Depression     Diabetes mellitus (HCC)     Diabetic polyneuropathy (HCC)     Diverticulitis     Diverticulosis     GERD (gastroesophageal reflux disease)     History of cardiovascular stress test 6/12/2014    lexiscan    Hyperlipidemia     Hypertension     Mitral valve prolapse     Neuropathy     Overactive thyroid gland     Rapid heartbeat     Shortness of breath     Sleep apnea     Uses cpap    Sleep apnea     Thyroid disease      Past Surgical History:   Procedure Laterality Date    ABDOMINAL EXPLORATION SURGERY      APPENDECTOMY  1968    BRONCHOSCOPY  Oct 2013    Bronchoscopy and Mediastinoscopy    CHOLECYSTECTOMY, LAPAROSCOPIC N/A 5/12/2020    CHOLECYSTECTOMY LAPAROSCOPIC WITH IOC performed by Awilda Ly MD at [address] [place] [address] Drive  10/1995    left   [facility]    UPPER GASTROINTESTINAL ENDOSCOPY N/A 1/23/2020    EGD BIOPSY performed by Jackson Castillo Nikole Caban MD at [facility]       Current Outpatient Medications:     ondansetron (ZOFRAN-ODT) 4 MG disintegrating tablet, Take 1 tablet by mouth every 8 hours as needed for Nausea or Vomiting, Disp: 20 tablet, Rfl: 1    budesonide-formoterol (SYMBICORT) 160-4.5 MCG/ACT AERO, Inhale 2 puffs into the lungs 2 times daily, Disp: , Rfl:     Multiple Vitamins-Minerals (THERAPEUTIC MULTIVITAMIN-MINERALS) tablet, Take 1 tablet by mouth daily, Disp: , Rfl:     buPROPion (WELLBUTRIN XL) 150 MG extended release tablet, Take 150 mg by mouth every morning, Disp: , Rfl:     montelukast (SINGULAIR) 10 MG tablet, nightly , Disp: , Rfl:     ferrous sulfate 325 (65 Fe) MG tablet, Take 1 tablet by mouth daily (with breakfast) Take with your multi vitamin daily. , Disp: 30 tablet, Rfl: 1    vitamin D (ERGOCALCIFEROL) 1.25 MG (52781 UT) CAPS capsule, Take 1 capsule by mouth Twice a Week, Disp: 24 capsule, Rfl: 0    lisinopril-hydrochlorothiazide (PRINZIDE;ZESTORETIC) 20-12.5 MG per tablet, Take 1 tablet by mouth daily, Disp: , Rfl:     DULoxetine (CYMBALTA) 60 MG extended release capsule, Take 60 mg by mouth daily, Disp: , Rfl:     cetirizine (ZYRTEC) 10 MG tablet, Take 10 mg by mouth daily, Disp: , Rfl:     fluticasone (FLONASE) 50 MCG/ACT nasal spray, 1 spray by Nasal route daily, Disp: 1 Bottle, Rfl: 3    meloxicam (MOBIC) 7.5 MG tablet, Take 7.5 mg by mouth daily, Disp: , Rfl:     oxybutynin (DITROPAN) 5 MG tablet, Take 5 mg by mouth 3 times daily, Disp: , Rfl:     ezetimibe (ZETIA) 10 MG tablet, Take 10 mg by mouth daily, Disp: , Rfl:     omeprazole (PRILOSEC) 20 MG capsule, Take 20 mg by mouth daily. , Disp: , Rfl:     lovastatin (MEVACOR) 40 MG tablet, Take 40 mg by mouth nightly., Disp: , Rfl:     metoprolol (LOPRESSOR) 25 MG tablet, Take 25 mg by mouth nightly., Disp: , Rfl:     metFORMIN (GLUCOPHAGE) 500 MG tablet, Take 500 mg by mouth 2 times daily (with meals). , Disp: , Rfl:     levothyroxine (SYNTHROID) 50 MCG tablet, Take 75 mcg by mouth Daily , Disp: , Rfl:     gabapentin (NEURONTIN) 600 MG tablet, Take 600 mg by mouth 3 times daily. , Disp: , Rfl:     sertraline (ZOLOFT) 100 MG tablet, Take 100 mg by mouth nightly. Takes 1and 1/2 tablets nightly, Disp: , Rfl:     albuterol (PROVENTIL HFA;VENTOLIN HFA) 108 (90 BASE) MCG/ACT inhaler, Inhale 2 puffs into the lungs every 6 hours as needed. , Disp: , Rfl:     aspirin 81 MG tablet, Take 81 mg by mouth daily. , Disp: , Rfl:   Allergies   Allergen Reactions    Food Other (See Comments)     Lactose Intolerance - Milk and Ice Cream Only    Milk-Related Compounds Other (See Comments)     Intolerance      Other      Enviromental      Codeine Nausea And Vomiting     itching     Social History     Socioeconomic History    Marital status:      Spouse name: Not on file    Number of children: Not on file    Years of education: Not on file    Highest education level: Not on file   Occupational History    Not on file   Tobacco Use    Smoking status: Former Smoker     Packs/day: 4.00     Years: 45.00     Pack years: 180.00     Types: Cigarettes     Quit date: 2013     Years since quittin.7    Smokeless tobacco: Never Used    Tobacco comment: Uses E-cig   Vaping Use    Vaping Use: Every day    Last attempt to quit: 2020   Substance and Sexual Activity    Alcohol use: No     Comment: 8 cans pop daily    Drug use: No    Sexual activity: Yes   Other Topics Concern    Not on file   Social History Narrative    Not on file     Social Determinants of Health     Financial Resource Strain:     Difficulty of Paying Living Expenses:    Food Insecurity:     Worried About Running Out of Food in the Last Year:     Ran Out of Food in the Last Year:    Transportation Needs:     Lack of Transportation (Medical):      Lack of Transportation (Non-Medical):    Physical Activity:     Days of Exercise per Week:     Minutes of Exercise per Session: Stress:     Feeling of Stress :    Social Connections:     Frequency of Communication with Friends and Family:     Frequency of Social Gatherings with Friends and Family:     Attends Ep[employer] Services:     Active Member of Clubs or Organizations:     Attends Club or Organization Meetings:     Marital Status:    Intimate Partner Violence:     Fear of Current or Ex-Partner:     Emotionally Abused:     Physically Abused:     Sexually Abused:      Family History   Problem Relation Age of Onset    Cancer Mother     Cancer Father     Cancer Maternal Grandmother     Cancer Maternal Grandfather     No Known Problems Paternal Grandfather     No Known Problems Paternal Grandmother          REVIEW OF SYSTEMS:     General/Constitution:  (-)weight loss, (-)fever, (-)chills, (-)weakness. Skin: (-) rash,(-) psoriasis,(-) eczema, (-)skin cancer. Musculoskeletal: (-) fractures,  (-) dislocations,(-) collagen vascular disease, (-) fibromyalgia, (-) multiple sclerosis, (-) muscular dystrophy, (-) RSD,(-) joint pain (-)swelling, (-) joint pain,swelling. Neurologic: (-) epilepsy, (-)seizures,(-) brain tumor,(-) TIA, (-)stroke, (-)headaches, (-)Parkinson disease,(-) memory loss, (-) LOC. Cardiovascular: (-) Chest pain, (-) swelling in legs/feet, (-) SOB, (-) cramping in legs/feet with walking. Respiratory: (-) SOB, (-) Coughing, (-) night sweats. GI: (-) nausea, (-) vomiting, (-) diarrhea, (-) blood in stool, (-) gastric ulcer. Psychiatric: (-) Depression, (-) Anxiety, (-) bipolar disease, (-) Alzheimer's Disease  Allergic/Immunologic: (-) allergies latex, (-) allergies metal, (-) skin sensitivity. Hematlogic: (-) anemia, (-) blood transfusion, (-) DVT/PE, (-) Clotting disorders      Subjective:    Constitution:    Ht 5' 1\" (1.549 m)   Wt 234 lb (106.1 kg)   LMP 01/01/1993   BMI 44.21 kg/m²     Psycihatric:  The patient is alert and oriented x 3, appears to be stated age and in no distress. Respiratory:  Respiratory effort is not labored. Patient is not gasping. Palpation of the chest reveals no tactile fremitus. Skin:  Upon inspection: the skin appears warm, dry and intact. There is not a previous scar over the affected area. There is not any cellulitis, lymphedema or cutaneous lesions noted in the lower extremities. Upon palpation there is no induration noted. Neurologic:  Motor exam of the lower extremities show ; quadriceps, hamstrings, foot dorsi and plantar flexors intact R.  5/5 and L. 5/5. Deep tendon reflexes are 2/4 at the knees and 2/4 at the ankles with strong extensor hallicus longus motor strength bilaterally. Sensory to both feet is intact to all sensory roots. Cardiovascular: The vascular exam is normal and is well perfused to distal extremities. Distal pulses DP/PT: R. 2+; L. 2+. There is cap refill noted less than two seconds in all digits. There is not edema of the bilateral lower extremities. There is not varicosities noted in the distal extremities. Lymph:  Upon palpation,  there is no lymphadenopathy noted in bilateral lower extremities. Musculoskeletal:  Gait: antalgic;  Examination of the digits and nails reveal no cyanosis or clubbing    Lumbar exam:  On visual inspection, there is not deformity of the spine. full range of motion, no tenderness, palpable spasm or pain on motion. Special tests: Straight Leg Raise negative, Sunday test negative. Hip exam:  Upon visual inspection there is not a deformity noted. Patient complains of tenderness at the  greater trochanter. Exam of the bilateral hip shows none leg length discrepancy. Range of motion of the involved/uninvolved hip is 25 degrees internal rotation and 35 degrees external rotation/25 degrees internal rotation and 35 degrees external rotation, the hip joint is stable to testing.  Strength of the lower extremity is normal.The patient does not have ipsilateral knee pain, and is described as  none. Hip exam- Gait: normal; Strength: Hip Flexors 5/5; Hip Abductors 5/5; Hip Adduction 5/5. Knee exam :  Upon visual inspection there is not deformity noted. She does not have  pain on motion, there is not tenderness over the  medial, lateral, anterior region. Range of motion of R. Knee is 0 to 120, and L. Knee is 0 to 120. there are not any masses, there is not ligamentous instability, there is not  deformity noted. Xrays:  Minimal arthritis to bilateral hips    Radiographic findings reviewed with patient    Impression:  Encounter Diagnoses   Name Primary?  Trochanteric bursitis, left hip Yes    Greater trochanteric bursitis of right hip        Plan:  Natural history and expected course discussed. Questions answered. Educational materials distributed. Home exercises discussed. NSAIDs per medication orders. Verbal and written consent was obtained by the patient. The patient was positioned on Her unaffected side and prepped with alcohol. She was injected with 9ml of 0.25% Marcaine and 1ml of Kenalog 40 mg in the Bilateral greater trochanter of the hip. She tolerated the injection well and we will see them back prn  HEP.

## 2021-06-17 ENCOUNTER — OFFICE VISIT (OUTPATIENT)
Dept: ORTHOPEDIC SURGERY | Age: 66
End: 2021-06-17
Payer: MEDICARE

## 2021-06-17 VITALS — HEIGHT: 61 IN | WEIGHT: 210 LBS | BODY MASS INDEX: 39.65 KG/M2

## 2021-06-17 DIAGNOSIS — M17.11 PRIMARY OSTEOARTHRITIS OF RIGHT KNEE: Primary | ICD-10-CM

## 2021-06-17 DIAGNOSIS — M17.12 PRIMARY OSTEOARTHRITIS OF LEFT KNEE: ICD-10-CM

## 2021-06-17 DIAGNOSIS — S83.242A ACUTE MEDIAL MENISCUS TEAR, LEFT, INITIAL ENCOUNTER: ICD-10-CM

## 2021-06-17 PROCEDURE — 99213 OFFICE O/P EST LOW 20 MIN: CPT | Performed by: ORTHOPAEDIC SURGERY

## 2021-06-17 PROCEDURE — G8427 DOCREV CUR MEDS BY ELIG CLIN: HCPCS | Performed by: ORTHOPAEDIC SURGERY

## 2021-06-17 PROCEDURE — 1036F TOBACCO NON-USER: CPT | Performed by: ORTHOPAEDIC SURGERY

## 2021-06-17 PROCEDURE — G8417 CALC BMI ABV UP PARAM F/U: HCPCS | Performed by: ORTHOPAEDIC SURGERY

## 2021-06-17 PROCEDURE — 3017F COLORECTAL CA SCREEN DOC REV: CPT | Performed by: ORTHOPAEDIC SURGERY

## 2021-06-17 NOTE — PROGRESS NOTES
blood work today   take Zoloft as directed for depression a report any severe side effects   trazodone as needed for sleep   reconsider psychiatry referral   Chief Complaint   Patient presents with    Knee Pain     patient here for a follow up on bilateral knee pain. patient states that the synvisc injection did not provide relief or improvement. Subjective:     Patient ID: Ramos Gonzáles is a 60 y.o..  female    Knee Pain  Patient follows up today for her bilateral knee pain. She states that today, her left knee hurts worse than her right knee. She also states she is getting clicking and popping after trying to get out of a deep seated chair a couple weeks ago. Since that time, she is feeling worse and the pain is constant.     Past Medical History:   Diagnosis Date    Arthritis     back, cervical neck    Asthma     Breast mass     Chronic sinusitis     Colon polyps     COPD (chronic obstructive pulmonary disease) (HCC)     Depression     Diabetes mellitus (HCC)     Diabetic polyneuropathy (HCC)     Diverticulitis     Diverticulosis     GERD (gastroesophageal reflux disease)     History of cardiovascular stress test 6/12/2014    lexiscan    Hyperlipidemia     Hypertension     Mitral valve prolapse     Neuropathy     Overactive thyroid gland     Rapid heartbeat     Shortness of breath     Sleep apnea     Uses cpap    Sleep apnea     Thyroid disease      Past Surgical History:   Procedure Laterality Date    ABDOMINAL EXPLORATION SURGERY      APPENDECTOMY  1968    BRONCHOSCOPY  Oct 2013    Bronchoscopy and Mediastinoscopy    CHOLECYSTECTOMY, LAPAROSCOPIC N/A 5/12/2020    CHOLECYSTECTOMY LAPAROSCOPIC WITH IOC performed by Marta Estrada MD at [address] [place] [address] Drive  10/1994    left   [facility]    UPPER GASTROINTESTINAL ENDOSCOPY N/A 1/23/2020    EGD BIOPSY performed by Marta Estrada MD at [place] [address]       Current Outpatient Medications:     ondansetron (ZOFRAN-ODT) 4 MG disintegrating tablet, Take 1 tablet by mouth every 8 hours as needed for Nausea or Vomiting, Disp: 20 tablet, Rfl: 1   budesonide-formoterol (SYMBICORT) 160-4.5 MCG/ACT AERO, Inhale 2 puffs into the lungs 2 times daily, Disp: , Rfl:     Multiple Vitamins-Minerals (THERAPEUTIC MULTIVITAMIN-MINERALS) tablet, Take 1 tablet by mouth daily, Disp: , Rfl:     buPROPion (WELLBUTRIN XL) 150 MG extended release tablet, Take 150 mg by mouth every morning, Disp: , Rfl:     montelukast (SINGULAIR) 10 MG tablet, nightly , Disp: , Rfl:     ferrous sulfate 325 (65 Fe) MG tablet, Take 1 tablet by mouth daily (with breakfast) Take with your multi vitamin daily. , Disp: 30 tablet, Rfl: 1    vitamin D (ERGOCALCIFEROL) 1.25 MG (74222 UT) CAPS capsule, Take 1 capsule by mouth Twice a Week, Disp: 24 capsule, Rfl: 0    lisinopril-hydrochlorothiazide (PRINZIDE;ZESTORETIC) 20-12.5 MG per tablet, Take 1 tablet by mouth daily, Disp: , Rfl:     DULoxetine (CYMBALTA) 60 MG extended release capsule, Take 60 mg by mouth daily, Disp: , Rfl:     cetirizine (ZYRTEC) 10 MG tablet, Take 10 mg by mouth daily, Disp: , Rfl:     fluticasone (FLONASE) 50 MCG/ACT nasal spray, 1 spray by Nasal route daily, Disp: 1 Bottle, Rfl: 3    meloxicam (MOBIC) 7.5 MG tablet, Take 7.5 mg by mouth daily, Disp: , Rfl:     oxybutynin (DITROPAN) 5 MG tablet, Take 5 mg by mouth 3 times daily, Disp: , Rfl:     ezetimibe (ZETIA) 10 MG tablet, Take 10 mg by mouth daily, Disp: , Rfl:     omeprazole (PRILOSEC) 20 MG capsule, Take 20 mg by mouth daily. , Disp: , Rfl:     lovastatin (MEVACOR) 40 MG tablet, Take 40 mg by mouth nightly., Disp: , Rfl:     metoprolol (LOPRESSOR) 25 MG tablet, Take 25 mg by mouth nightly., Disp: , Rfl:     metFORMIN (GLUCOPHAGE) 500 MG tablet, Take 500 mg by mouth 2 times daily (with meals). , Disp: , Rfl:     levothyroxine (SYNTHROID) 50 MCG tablet, Take 75 mcg by mouth Daily , Disp: , Rfl:     gabapentin (NEURONTIN) 600 MG tablet, Take 600 mg by mouth 3 times daily. , Disp: , Rfl:     sertraline (ZOLOFT) 100 MG tablet, Take 100 mg by mouth nightly. Takes 1and 1/2 tablets nightly, Disp: , Rfl:     albuterol (PROVENTIL HFA;VENTOLIN HFA) 108 (90 BASE) MCG/ACT inhaler, Inhale 2 puffs into the lungs every 6 hours as needed. , Disp: , Rfl:     aspirin 81 MG tablet, Take 81 mg by mouth daily. , Disp: , Rfl:   Allergies   Allergen Reactions    Food Other (See Comments)     Lactose Intolerance - Milk and Ice Cream Only    Milk-Related Compounds Other (See Comments)     Intolerance      Other      Enviromental      Codeine Nausea And Vomiting     itching     Social History     Socioeconomic History    Marital status:      Spouse name: Not on file    Number of children: Not on file    Years of education: Not on file    Highest education level: Not on file   Occupational History    Not on file   Tobacco Use    Smoking status: Former Smoker     Packs/day: 4.00     Years: 45.00     Pack years: 180.00     Types: Cigarettes     Quit date: 2013     Years since quittin.7    Smokeless tobacco: Never Used    Tobacco comment: Uses E-cig   Vaping Use    Vaping Use: Every day    Last attempt to quit: 2020   Substance and Sexual Activity    Alcohol use: No     Comment: 8 cans pop daily    Drug use: No    Sexual activity: Yes   Other Topics Concern    Not on file   Social History Narrative    Not on file     Social Determinants of Health     Financial Resource Strain:     Difficulty of Paying Living Expenses:    Food Insecurity:     Worried About Running Out of Food in the Last Year:     Ran Out of Food in the Last Year:    Transportation Needs:     Lack of Transportation (Medical):      Lack of Transportation (Non-Medical):    Physical Activity:     Days of Exercise per Week:     Minutes of Exercise per Session:    Stress:     Feeling of Stress :    Social Connections:     Frequency of Communication with Friends and Family:     Frequency of Social Gatherings with Friends and Family:     Attends Jainism Services:     Active Member of Clubs or Organizations:     Attends Club or Organization Meetings:     Marital Status:    Intimate Partner Violence:     Fear of Current or Ex-Partner:     Emotionally Abused:     Physically Abused:     Sexually Abused:      Family History   Problem Relation Age of Onset    Cancer Mother     Cancer Father     Cancer Maternal Grandmother     Cancer Maternal Grandfather     No Known Problems Paternal Grandfather     No Known Problems Paternal Grandmother          REVIEW OF SYSTEMS:     General/Constitution:  (-)weight loss, (-)fever, (-)chills, (-)weakness. Skin: (-) rash,(-) psoriasis,(-) eczema, (-)skin cancer. Musculoskeletal: (-) fractures,  (-) dislocations,(-) collagen vascular disease, (-) fibromyalgia, (-) multiple sclerosis, (-) muscular dystrophy, (-) RSD,(-) joint pain (-)swelling, (-) joint pain,swelling. Neurologic: (-) epilepsy, (-)seizures,(-) brain tumor,(-) TIA, (-)stroke, (-)headaches, (-)Parkinson disease,(-) memory loss, (-) LOC. Cardiovascular: (-) Chest pain, (-) swelling in legs/feet, (-) SOB, (-) cramping in legs/feet with walking. Respiratory: (-) SOB, (-) Coughing, (-) night sweats. GI: (-) nausea, (-) vomiting, (-) diarrhea, (-) blood in stool, (-) gastric ulcer. Psychiatric: (-) Depression, (-) Anxiety, (-) bipolar disease, (-) Alzheimer's Disease  Allergic/Immunologic: (-) allergies latex, (-) allergies metal, (-) skin sensitivity. Hematlogic: (-) anemia, (-) blood transfusion, (-) DVT/PE, (-) Clotting disorders    Subjective:    Constitution:  Ht 5' 1\" (1.549 m)   Wt 210 lb (95.3 kg)   LMP 01/01/1993   BMI 39.68 kg/m²     Psycihatric:  The patient is alert and oriented x 3, appears to be stated age and in no distress. Respiratory:  Respiratory effort is not labored. Patient is not gasping. Palpation of the chest reveals no tactile fremitus. Skin:  Upon inspection: the skin appears warm, dry and intact.   There is  a previous scar over the affected area.There is any cellulitis, lymphedema or cutaneous lesions noted in the lower extremities. Upon palpation there is no induration noted. Neurologic:  Gait: antalgic; Motor exam of the lower extremities show ; quadriceps, hamstrings, foot dorsi and plantar flexors intact R.  5/5 and L. 5/5. Deep tendon reflexes are 2/4 at the knees and 2/4 at the ankles with strong extensor hallicus longus motor strength bilaterally. Sensory to both feet is intact to all sensory roots. Cardiovascular: The vascular exam is normal and is well perfused to distal extremities. Distal pulses DP/PT: R. 2+; L. 2+. There is cap refill noted less than two seconds in all digits. There is not edema of the bilateral lower extremities. There is not varicosities noted in the distal extremities. Lymph:  Upon palpation,  there is no lymphadenopathy noted in bilateral lower extremities. Musculoskeletal:  Gait: antalgic; examination of the nails and digits reveal no cyanosis or clubbing. Lumbar exam:  On visual inspection, there is not deformity of the spine. full range of motion, no tenderness, palpable spasm or pain on motion. Special tests: Straight Leg Raise negative, Sunday test negative. Hip exam:   Upon inspection, there is not deformity noted. Upon palpation there is not tenderness. ROM: is  full and symmetrical.   Strength: Hip Flexors 5/5; Hip Abductors 5/5; Hip Adduction 5/5. Knee exam:  Bilateral knee exam shows;  range of motion of R. Knee is 0 to 115, and L. Knee is 5 to 115. The patient does have  pain on motion, effusion is mild, there is tenderness over the  global region, there are not any masses, there is not ligamentous instability, there is  deformity noted. Knee exam: bilateral positive for moderate crepitations, some mild tenderness laxity is not noted with  stress.   There is not a popliteal cyst.    R. Knee:  Lachman's negative, Anterior Drawer negative, Posterior Drawer negative  Marysol's positive, Thallasy  positive,   PF grind test positive, Apprehension test negative, Patellar J sign  negative  L. Knee:  Lachman's negative, Anterior Drawer negative, Posterior Drawer negative  Marysol's positive, Thallasy  positive,   PF grind test positive, Apprehension test negative,  Patellar J sign  negative    Xray Exam:  Left knee: Moderate osteoarthritis at the medial weight-bearing compartment   with milder involvement elsewhere.  Very small joint effusion.  No fracture   or dislocation.       Right knee: Moderate osteoarthritis at the medial weight-bearing compartment   with milder involvement elsewhere.  No fracture or dislocation.  Normal soft   tissues. Radiographic findings reviewed with patient    Assessment:  Encounter Diagnoses   Name Primary?  Primary osteoarthritis of right knee Yes    Primary osteoarthritis of left knee        Plan:  Natural history and expected course discussed. Questions answered. Educational materials distributed. Rest, ice, compression, and elevation (RICE) therapy. Reduction in offending activity. Based on her new injury and my exam, I will order an MRI of her left knee and see her back with the result.

## 2021-06-25 ENCOUNTER — HOSPITAL ENCOUNTER (OUTPATIENT)
Dept: MRI IMAGING | Age: 66
Discharge: HOME | End: 2021-06-27
Payer: MEDICARE

## 2021-06-25 DIAGNOSIS — M17.12 PRIMARY OSTEOARTHRITIS OF LEFT KNEE: ICD-10-CM

## 2021-06-25 DIAGNOSIS — S83.242A ACUTE MEDIAL MENISCUS TEAR, LEFT, INITIAL ENCOUNTER: ICD-10-CM

## 2021-06-25 PROCEDURE — 73721 MRI JNT OF LWR EXTRE W/O DYE: CPT

## 2021-07-13 ENCOUNTER — OFFICE VISIT (OUTPATIENT)
Dept: ORTHOPEDIC SURGERY | Age: 66
End: 2021-07-13
Payer: MEDICARE

## 2021-07-13 VITALS — WEIGHT: 210 LBS | TEMPERATURE: 98 F | BODY MASS INDEX: 39.65 KG/M2 | HEIGHT: 61 IN

## 2021-07-13 DIAGNOSIS — S83.242A ACUTE MEDIAL MENISCUS TEAR, LEFT, INITIAL ENCOUNTER: Primary | ICD-10-CM

## 2021-07-13 DIAGNOSIS — M17.12 PRIMARY OSTEOARTHRITIS OF LEFT KNEE: ICD-10-CM

## 2021-07-13 PROCEDURE — G8427 DOCREV CUR MEDS BY ELIG CLIN: HCPCS | Performed by: ORTHOPAEDIC SURGERY

## 2021-07-13 PROCEDURE — G8417 CALC BMI ABV UP PARAM F/U: HCPCS | Performed by: ORTHOPAEDIC SURGERY

## 2021-07-13 PROCEDURE — 99214 OFFICE O/P EST MOD 30 MIN: CPT | Performed by: ORTHOPAEDIC SURGERY

## 2021-07-13 PROCEDURE — 1036F TOBACCO NON-USER: CPT | Performed by: ORTHOPAEDIC SURGERY

## 2021-07-13 PROCEDURE — 3017F COLORECTAL CA SCREEN DOC REV: CPT | Performed by: ORTHOPAEDIC SURGERY

## 2021-07-13 NOTE — PROGRESS NOTES
Chief Complaint   Patient presents with    Knee Pain     mri f/u left knee       Subjective:     Patient ID: Shyann Mariano is a 60 y.o..  female    Knee Pain  Patient follows up today for her left knee pain. She recently underwent an MRI and is here today to discuss the results.     Past Medical History:   Diagnosis Date    Arthritis     back, cervical neck    Asthma     Breast mass     Chronic sinusitis     Colon polyps     COPD (chronic obstructive pulmonary disease) (HCC)     Depression     Diabetes mellitus (HCC)     Diabetic polyneuropathy (HCC)     Diverticulitis     Diverticulosis     GERD (gastroesophageal reflux disease)     History of cardiovascular stress test 6/12/2014    lexiscan    Hyperlipidemia     Hypertension     Mitral valve prolapse     Neuropathy     Overactive thyroid gland     Rapid heartbeat     Shortness of breath     Sleep apnea     Uses cpap    Sleep apnea     Thyroid disease      Past Surgical History:   Procedure Laterality Date    ABDOMINAL EXPLORATION SURGERY      APPENDECTOMY  1968    BRONCHOSCOPY  Oct 2013    Bronchoscopy and Mediastinoscopy    CHOLECYSTECTOMY, LAPAROSCOPIC N/A 5/12/2020    CHOLECYSTECTOMY LAPAROSCOPIC WITH IOC performed by Meme Bowling MD at [address] [place] [address]  10/1995    [address]    UPPER GASTROINTESTINAL ENDOSCOPY N/A 1/23/2020    EGD BIOPSY performed by Meme Bowling MD at [place] [address]       Current Outpatient Medications:     ondansetron (ZOFRAN-ODT) 4 MG disintegrating tablet, Take 1 tablet by mouth every 8 hours as needed for Nausea or Vomiting, Disp: 20 tablet, Rfl: 1    budesonide-formoterol (SYMBICORT) 160-4.5 MCG/ACT AERO, Inhale 2 puffs into the lungs 2 times daily, Disp: , Rfl:     buPROPion (WELLBUTRIN XL) 150 MG extended release tablet, Take 150 mg by mouth every morning, Disp: , Rfl:     montelukast (SINGULAIR) 10 MG tablet, nightly , Disp: , Rfl:    lisinopril-hydrochlorothiazide (PRINZIDE;ZESTORETIC) 20-12.5 MG per tablet, Take 1 tablet by mouth daily, Disp: , Rfl:     DULoxetine (CYMBALTA) 60 MG extended release capsule, Take 60 mg by mouth daily, Disp: , Rfl:     cetirizine (ZYRTEC) 10 MG tablet, Take 10 mg by mouth daily, Disp: , Rfl:     fluticasone (FLONASE) 50 MCG/ACT nasal spray, 1 spray by Nasal route daily, Disp: 1 Bottle, Rfl: 3    ezetimibe (ZETIA) 10 MG tablet, Take 10 mg by mouth daily, Disp: , Rfl:     omeprazole (PRILOSEC) 20 MG capsule, Take 20 mg by mouth daily. , Disp: , Rfl:     lovastatin (MEVACOR) 40 MG tablet, Take 40 mg by mouth nightly., Disp: , Rfl:     metoprolol (LOPRESSOR) 25 MG tablet, Take 25 mg by mouth nightly., Disp: , Rfl:     metFORMIN (GLUCOPHAGE) 500 MG tablet, Take 500 mg by mouth 2 times daily (with meals). , Disp: , Rfl:     levothyroxine (SYNTHROID) 50 MCG tablet, Take 75 mcg by mouth Daily , Disp: , Rfl:     gabapentin (NEURONTIN) 600 MG tablet, Take 600 mg by mouth 3 times daily. , Disp: , Rfl:     sertraline (ZOLOFT) 100 MG tablet, Take 100 mg by mouth nightly. Takes 1and 1/2 tablets nightly, Disp: , Rfl:     albuterol (PROVENTIL HFA;VENTOLIN HFA) 108 (90 BASE) MCG/ACT inhaler, Inhale 2 puffs into the lungs every 6 hours as needed. , Disp: , Rfl:     aspirin 81 MG tablet, Take 81 mg by mouth daily. , Disp: , Rfl:     Multiple Vitamins-Minerals (THERAPEUTIC MULTIVITAMIN-MINERALS) tablet, Take 1 tablet by mouth daily (Patient not taking: Reported on 7/13/2021), Disp: , Rfl:     ferrous sulfate 325 (65 Fe) MG tablet, Take 1 tablet by mouth daily (with breakfast) Take with your multi vitamin daily.  (Patient not taking: Reported on 7/13/2021), Disp: 30 tablet, Rfl: 1    vitamin D (ERGOCALCIFEROL) 1.25 MG (21590 UT) CAPS capsule, Take 1 capsule by mouth Twice a Week (Patient not taking: Reported on 7/13/2021), Disp: 24 capsule, Rfl: 0    meloxicam (MOBIC) 7.5 MG tablet, Take 7.5 mg by mouth daily (Patient not taking: Reported on 2021), Disp: , Rfl:     oxybutynin (DITROPAN) 5 MG tablet, Take 5 mg by mouth 3 times daily (Patient not taking: Reported on 2021), Disp: , Rfl:   Allergies   Allergen Reactions    Food Other (See Comments)     Lactose Intolerance - Milk and Ice Cream Only    Milk-Related Compounds Other (See Comments)     Intolerance      Other      Enviromental      Codeine Nausea And Vomiting     itching     Social History     Socioeconomic History    Marital status:      Spouse name: Not on file    Number of children: Not on file    Years of education: Not on file    Highest education level: Not on file   Occupational History    Not on file   Tobacco Use    Smoking status: Former Smoker     Packs/day: 4.00     Years: 45.00     Pack years: 180.00     Types: Cigarettes     Quit date: 2013     Years since quittin.8    Smokeless tobacco: Never Used    Tobacco comment: Uses E-cig   Vaping Use    Vaping Use: Every day    Last attempt to quit: 2020   Substance and Sexual Activity    Alcohol use: No     Comment: 8 cans pop daily    Drug use: No    Sexual activity: Yes   Other Topics Concern    Not on file   Social History Narrative    Not on file     Social Determinants of Health     Financial Resource Strain:     Difficulty of Paying Living Expenses:    Food Insecurity:     Worried About Running Out of Food in the Last Year:     Ran Out of Food in the Last Year:    Transportation Needs:     Lack of Transportation (Medical):      Lack of Transportation (Non-Medical):    Physical Activity:     Days of Exercise per Week:     Minutes of Exercise per Session:    Stress:     Feeling of Stress :    Social Connections:     Frequency of Communication with Friends and Family:     Frequency of Social Gatherings with Friends and Family:     Attends [employer] Services:     Active Member of Clubs or Organizations:     Attends Club or Organization Meetings:    Rebecca Willoughby Marital Status:    Intimate Partner Violence:     Fear of Current or Ex-Partner:     Emotionally Abused:     Physically Abused:     Sexually Abused:      Family History   Problem Relation Age of Onset    Cancer Mother     Cancer Father     Cancer Maternal Grandmother     Cancer Maternal Grandfather     No Known Problems Paternal Grandfather     No Known Problems Paternal Grandmother          REVIEW OF SYSTEMS:     General/Constitution:  (-)weight loss, (-)fever, (-)chills, (-)weakness. Skin: (-) rash,(-) psoriasis,(-) eczema, (-)skin cancer. Musculoskeletal: (-) fractures,  (-) dislocations,(-) collagen vascular disease, (-) fibromyalgia, (-) multiple sclerosis, (-) muscular dystrophy, (-) RSD,(-) joint pain (-)swelling, (-) joint pain,swelling. Neurologic: (-) epilepsy, (-)seizures,(-) brain tumor,(-) TIA, (-)stroke, (-)headaches, (-)Parkinson disease,(-) memory loss, (-) LOC. Cardiovascular: (-) Chest pain, (-) swelling in legs/feet, (-) SOB, (-) cramping in legs/feet with walking. Respiratory: (-) SOB, (-) Coughing, (-) night sweats. GI: (-) nausea, (-) vomiting, (-) diarrhea, (-) blood in stool, (-) gastric ulcer. Psychiatric: (-) Depression, (-) Anxiety, (-) bipolar disease, (-) Alzheimer's Disease  Allergic/Immunologic: (-) allergies latex, (-) allergies metal, (-) skin sensitivity. Hematlogic: (-) anemia, (-) blood transfusion, (-) DVT/PE, (-) Clotting disorders    Subjective:    Constitution:  Temp 98 °F (36.7 °C)   Ht 5' 1\" (1.549 m)   Wt 210 lb (95.3 kg)   LMP 01/01/1993   BMI 39.68 kg/m²     Psycihatric:  The patient is alert and oriented x 3, appears to be stated age and in no distress. Respiratory:  Respiratory effort is not labored. Patient is not gasping. Palpation of the chest reveals no tactile fremitus. Skin:  Upon inspection: the skin appears warm, dry and intact. There is  a previous scar over the affected area. There is any cellulitis, lymphedema or cutaneous lesions noted in the lower extremities. Upon palpation there is no induration noted. Neurologic:  Gait: antalgic; Motor exam of the lower extremities show ; quadriceps, hamstrings, foot dorsi and plantar flexors intact R.  5/5 and L. 5/5. Deep tendon reflexes are 2/4 at the knees and 2/4 at the ankles with strong extensor hallicus longus motor strength bilaterally. Sensory to both feet is intact to all sensory roots. Cardiovascular: The vascular exam is normal and is well perfused to distal extremities. Distal pulses DP/PT: R. 2+; L. 2+. There is cap refill noted less than two seconds in all digits. There is not edema of the bilateral lower extremities. There is not varicosities noted in the distal extremities. Lymph:  Upon palpation,  there is no lymphadenopathy noted in bilateral lower extremities. Musculoskeletal:  Gait: antalgic; examination of the nails and digits reveal no cyanosis or clubbing. Lumbar exam:  On visual inspection, there is not deformity of the spine. full range of motion, no tenderness, palpable spasm or pain on motion. Special tests: Straight Leg Raise negative, Sunday test negative. Hip exam:   Upon inspection, there is not deformity noted. Upon palpation there is not tenderness. ROM: is  full and symmetrical.   Strength: Hip Flexors 5/5; Hip Abductors 5/5; Hip Adduction 5/5. Knee exam:  Bilateral knee exam shows;  range of motion of R. Knee is 0 to 115, and L. Knee is 5 to 115. The patient does have  pain on motion, effusion is mild, there is tenderness over the  global region, there are not any masses, there is not ligamentous instability, there is  deformity noted. Knee exam: bilateral positive for moderate crepitations, some mild tenderness laxity is not noted with  stress.   There is not a popliteal cyst.    R. Knee:  Lachman's negative, Anterior Drawer negative, Posterior Drawer negative  Marysol's positive, Thallasy  positive,   PF grind test positive, Apprehension test negative, Patellar J sign  negative  L. Knee:  Lachman's negative, Anterior Drawer negative, Posterior Drawer negative  Marysol's positive, Thallasy  positive,   PF grind test positive, Apprehension test negative,  Patellar J sign  negative    Xray Exam:  Left knee: Moderate osteoarthritis at the medial weight-bearing compartment   with milder involvement elsewhere.  Very small joint effusion.  No fracture   or dislocation.       Right knee: Moderate osteoarthritis at the medial weight-bearing compartment   with milder involvement elsewhere.  No fracture or dislocation.  Normal soft   tissues. MRI:  Medial meniscus tear.       Tricompartment degenerative changes most significant to the medial   compartment.       Trace knee joint effusion. Radiographic findings reviewed with patient    Assessment:  Encounter Diagnoses   Name Primary?  Acute medial meniscus tear, left, initial encounter Yes    Primary osteoarthritis of left knee        Plan:  Natural history and expected course discussed. Questions answered. Educational materials distributed. Rest, ice, compression, and elevation (RICE) therapy. Reduction in offending activity. I had a lengthy discussion with the patient regarding their diagnosis. I explained treatment options including surgical vs non surgical treatment. I reviewed in detail the risks and benefits and outlined the procedure in detail with expected outcomes and possible complications. I also discussed non surgical treatment such as injections (CSI and visco supplementation), physical therapy, topical creams and NSAID's. They have elected for surgical management at this time. The risks and benefits of a knee arthroscopy were discussed with the patient.   The risks are including but not limited to: infection, injuries to blood vessels and nerves, non relief of symptoms, arthrofibrosis of knee, need for further operative intervention, blood loss, PE/DVT, MI and death. The risks are understood by the patient and they wish to proceed with a Left knee arthroscopy, medial menisectomy and debridement 08/04/2021. The patient was counseled at length about the risks of félix Covid-19 during their perioperative period and any recovery window from their procedure. The patient was made aware that félix Covid-19  may worsen their prognosis for recovering from their procedure  and lend to a higher morbidity and/or mortality risk. All material risks, benefits, and reasonable alternatives including postponing the procedure were discussed. The patient does wish to proceed with the procedure at this time. At least 30 minutes was spent discussing the diagnosis and treatment options with the patient with at least 50% of the time was spent with decision making and counseling the patient.

## 2021-07-28 RX ORDER — SODIUM CHLORIDE 9 MG/ML
INJECTION, SOLUTION INTRAVENOUS CONTINUOUS
Status: CANCELLED | OUTPATIENT
Start: 2021-07-28

## 2021-07-29 ENCOUNTER — HOSPITAL ENCOUNTER (OUTPATIENT)
Dept: PREADMISSION TESTING | Age: 66
Discharge: HOME | End: 2021-07-29
Payer: MEDICARE

## 2021-07-29 VITALS
OXYGEN SATURATION: 98 % | DIASTOLIC BLOOD PRESSURE: 59 MMHG | RESPIRATION RATE: 18 BRPM | BODY MASS INDEX: 38.51 KG/M2 | HEART RATE: 72 BPM | HEIGHT: 61 IN | TEMPERATURE: 97.1 F | WEIGHT: 204 LBS | SYSTOLIC BLOOD PRESSURE: 120 MMHG

## 2021-07-29 DIAGNOSIS — Z01.818 PRE-OP TESTING: Primary | ICD-10-CM

## 2021-07-29 LAB
ANION GAP SERPL CALCULATED.3IONS-SCNC: 10 MMOL/L (ref 7–16)
BUN BLDV-MCNC: 19 MG/DL (ref 6–23)
CALCIUM SERPL-MCNC: 9.6 MG/DL (ref 8.6–10.2)
CHLORIDE BLD-SCNC: 96 MMOL/L (ref 98–107)
CO2: 25 MMOL/L (ref 22–29)
CREAT SERPL-MCNC: 1.2 MG/DL (ref 0.5–1)
EKG ATRIAL RATE: 66 BPM
EKG P AXIS: 48 DEGREES
EKG P-R INTERVAL: 166 MS
EKG Q-T INTERVAL: 394 MS
EKG QRS DURATION: 88 MS
EKG QTC CALCULATION (BAZETT): 413 MS
EKG R AXIS: -10 DEGREES
EKG T AXIS: 41 DEGREES
EKG VENTRICULAR RATE: 66 BPM
GFR AFRICAN AMERICAN: 55
GFR NON-AFRICAN AMERICAN: 45 ML/MIN/1.73
GLUCOSE BLD-MCNC: 128 MG/DL (ref 74–99)
HCT VFR BLD CALC: 35.5 % (ref 34–48)
HEMOGLOBIN: 12 G/DL (ref 11.5–15.5)
MCH RBC QN AUTO: 30.8 PG (ref 26–35)
MCHC RBC AUTO-ENTMCNC: 33.8 % (ref 32–34.5)
MCV RBC AUTO: 91 FL (ref 80–99.9)
PDW BLD-RTO: 13.1 FL (ref 11.5–15)
PLATELET # BLD: 303 E9/L (ref 130–450)
PMV BLD AUTO: 9 FL (ref 7–12)
POTASSIUM REFLEX MAGNESIUM: 4.6 MMOL/L (ref 3.5–5)
RBC # BLD: 3.9 E12/L (ref 3.5–5.5)
SODIUM BLD-SCNC: 131 MMOL/L (ref 132–146)
WBC # BLD: 8.1 E9/L (ref 4.5–11.5)

## 2021-07-29 PROCEDURE — 36415 COLL VENOUS BLD VENIPUNCTURE: CPT

## 2021-07-29 PROCEDURE — 80048 BASIC METABOLIC PNL TOTAL CA: CPT

## 2021-07-29 PROCEDURE — 85027 COMPLETE CBC AUTOMATED: CPT

## 2021-07-29 PROCEDURE — 93005 ELECTROCARDIOGRAM TRACING: CPT | Performed by: ANESTHESIOLOGY

## 2021-07-29 ASSESSMENT — PAIN SCALES - GENERAL: PAINLEVEL_OUTOF10: 8

## 2021-07-29 ASSESSMENT — PAIN DESCRIPTION - PAIN TYPE: TYPE: CHRONIC PAIN

## 2021-07-29 ASSESSMENT — PAIN DESCRIPTION - DESCRIPTORS: DESCRIPTORS: ACHING;CRAMPING;DISCOMFORT;THROBBING

## 2021-07-29 ASSESSMENT — PAIN DESCRIPTION - LOCATION: LOCATION: KNEE

## 2021-07-29 ASSESSMENT — PAIN DESCRIPTION - ORIENTATION: ORIENTATION: LEFT

## 2021-07-29 NOTE — PROGRESS NOTES
checkbooks, etc.) Do not wear any makeup (including no eye makeup) or nail polish on your fingers or toes. 11. DO NOT wear any jewelry or piercings on day of surgery. All body piercing jewelry must be removed. 12. Shower the night before surgery with _x__Antibacterial soap /MIKA WIPES________    13. TOTAL JOINT REPLACEMENT/HYSTERECTOMY PATIENTS ONLY---Remember to bring Blood Bank bracelet to the hospital on the day of surgery. 14. If you have a Living Will and Durable Power of  for Healthcare, please bring in a copy. 15. If appropriate bring crutches, inspirex, WALKER, CANE etc... 12. Notify your Surgeon if you develop any illness between now and surgery time, cough, cold, fever, sore throat, nausea, vomiting, etc.  Please notify your surgeon if you experience dizziness, shortness of breath or blurred vision between now & the time of your surgery. 17. If you have _x__dentures, they will be removed before going to the OR; we will provide you a container. If you wear ___contact lenses or __x_glasses, they will be removed; please bring a case for them. 18. To provide excellent care visitors will be limited to 2 in the room at any given time. 19. Please bring picture ID and insurance card. 20. Sleep apnea patients need to bring CPAP AND SETTINGS to hospital on day of surgery. 21. During flu season no children under the age of 15 are permitted in the hospital for the safety of all patients. 22. Other walk in through visitors entrance and check in at front lobby registration desk                 Please call AMBULATORY CARE if you have any further questions.    [address] [facility]     [address]

## 2021-08-04 ENCOUNTER — ANESTHESIA EVENT (OUTPATIENT)
Dept: OPERATING ROOM | Age: 66
End: 2021-08-04
Payer: MEDICARE

## 2021-08-04 ENCOUNTER — ANESTHESIA (OUTPATIENT)
Dept: OPERATING ROOM | Age: 66
End: 2021-08-04
Payer: MEDICARE

## 2021-08-04 ENCOUNTER — HOSPITAL ENCOUNTER (OUTPATIENT)
Age: 66
Setting detail: OUTPATIENT SURGERY
Discharge: HOME | End: 2021-08-04
Attending: ORTHOPAEDIC SURGERY | Admitting: ORTHOPAEDIC SURGERY
Payer: MEDICARE

## 2021-08-04 VITALS
DIASTOLIC BLOOD PRESSURE: 64 MMHG | BODY MASS INDEX: 39.65 KG/M2 | TEMPERATURE: 96.9 F | RESPIRATION RATE: 18 BRPM | HEART RATE: 72 BPM | SYSTOLIC BLOOD PRESSURE: 142 MMHG | HEIGHT: 61 IN | OXYGEN SATURATION: 97 % | WEIGHT: 210 LBS

## 2021-08-04 VITALS
RESPIRATION RATE: 2 BRPM | OXYGEN SATURATION: 98 % | DIASTOLIC BLOOD PRESSURE: 78 MMHG | SYSTOLIC BLOOD PRESSURE: 129 MMHG | TEMPERATURE: 97.2 F

## 2021-08-04 DIAGNOSIS — M17.12 PRIMARY OSTEOARTHRITIS OF LEFT KNEE: Primary | ICD-10-CM

## 2021-08-04 LAB — METER GLUCOSE: 114 MG/DL (ref 74–99)

## 2021-08-04 PROCEDURE — 3700000000 HC ANESTHESIA ATTENDED CARE: Performed by: ORTHOPAEDIC SURGERY

## 2021-08-04 PROCEDURE — 3600000013 HC SURGERY LEVEL 3 ADDTL 15MIN: Performed by: ORTHOPAEDIC SURGERY

## 2021-08-04 PROCEDURE — 2580000003 HC RX 258: Performed by: ORTHOPAEDIC SURGERY

## 2021-08-04 PROCEDURE — 6360000002 HC RX W HCPCS: Performed by: ORTHOPAEDIC SURGERY

## 2021-08-04 PROCEDURE — 6360000002 HC RX W HCPCS

## 2021-08-04 PROCEDURE — 2500000003 HC RX 250 WO HCPCS: Performed by: ORTHOPAEDIC SURGERY

## 2021-08-04 PROCEDURE — 3700000001 HC ADD 15 MINUTES (ANESTHESIA): Performed by: ORTHOPAEDIC SURGERY

## 2021-08-04 PROCEDURE — 3600000003 HC SURGERY LEVEL 3 BASE: Performed by: ORTHOPAEDIC SURGERY

## 2021-08-04 PROCEDURE — 7100000010 HC PHASE II RECOVERY - FIRST 15 MIN: Performed by: ORTHOPAEDIC SURGERY

## 2021-08-04 PROCEDURE — 7100000000 HC PACU RECOVERY - FIRST 15 MIN: Performed by: ORTHOPAEDIC SURGERY

## 2021-08-04 PROCEDURE — 2580000003 HC RX 258: Performed by: ANESTHESIOLOGY

## 2021-08-04 PROCEDURE — 2709999900 HC NON-CHARGEABLE SUPPLY: Performed by: ORTHOPAEDIC SURGERY

## 2021-08-04 PROCEDURE — 7100000001 HC PACU RECOVERY - ADDTL 15 MIN: Performed by: ORTHOPAEDIC SURGERY

## 2021-08-04 PROCEDURE — 82962 GLUCOSE BLOOD TEST: CPT

## 2021-08-04 PROCEDURE — 6360000002 HC RX W HCPCS: Performed by: NURSE ANESTHETIST, CERTIFIED REGISTERED

## 2021-08-04 PROCEDURE — 29876 ARTHRS KNEE SURG SYNVCT MAJ: CPT | Performed by: ORTHOPAEDIC SURGERY

## 2021-08-04 PROCEDURE — 6360000002 HC RX W HCPCS: Performed by: NURSE PRACTITIONER

## 2021-08-04 PROCEDURE — 7100000011 HC PHASE II RECOVERY - ADDTL 15 MIN: Performed by: ORTHOPAEDIC SURGERY

## 2021-08-04 RX ORDER — BUPIVACAINE HYDROCHLORIDE 2.5 MG/ML
INJECTION, SOLUTION EPIDURAL; INFILTRATION; INTRACAUDAL PRN
Status: DISCONTINUED | OUTPATIENT
Start: 2021-08-04 | End: 2021-08-04 | Stop reason: ALTCHOICE

## 2021-08-04 RX ORDER — DEXAMETHASONE SODIUM PHOSPHATE 4 MG/ML
INJECTION, SOLUTION INTRA-ARTICULAR; INTRALESIONAL; INTRAMUSCULAR; INTRAVENOUS; SOFT TISSUE PRN
Status: DISCONTINUED | OUTPATIENT
Start: 2021-08-04 | End: 2021-08-04 | Stop reason: SDUPTHER

## 2021-08-04 RX ORDER — MEPERIDINE HYDROCHLORIDE 25 MG/ML
12.5 INJECTION INTRAMUSCULAR; INTRAVENOUS; SUBCUTANEOUS EVERY 5 MIN PRN
Status: DISCONTINUED | OUTPATIENT
Start: 2021-08-04 | End: 2021-08-04 | Stop reason: HOSPADM

## 2021-08-04 RX ORDER — LIDOCAINE HYDROCHLORIDE 20 MG/ML
INJECTION, SOLUTION INTRAVENOUS PRN
Status: DISCONTINUED | OUTPATIENT
Start: 2021-08-04 | End: 2021-08-04 | Stop reason: SDUPTHER

## 2021-08-04 RX ORDER — SODIUM CHLORIDE 9 MG/ML
INJECTION, SOLUTION INTRAVENOUS CONTINUOUS
Status: DISCONTINUED | OUTPATIENT
Start: 2021-08-04 | End: 2021-08-04 | Stop reason: HOSPADM

## 2021-08-04 RX ORDER — HYDROCODONE BITARTRATE AND ACETAMINOPHEN 5; 325 MG/1; MG/1
1 TABLET ORAL EVERY 6 HOURS PRN
Qty: 28 TABLET | Refills: 0 | Status: SHIPPED | OUTPATIENT
Start: 2021-08-04 | End: 2021-08-11

## 2021-08-04 RX ORDER — PROPOFOL 10 MG/ML
INJECTION, EMULSION INTRAVENOUS PRN
Status: DISCONTINUED | OUTPATIENT
Start: 2021-08-04 | End: 2021-08-04 | Stop reason: SDUPTHER

## 2021-08-04 RX ORDER — ONDANSETRON 2 MG/ML
INJECTION INTRAMUSCULAR; INTRAVENOUS PRN
Status: DISCONTINUED | OUTPATIENT
Start: 2021-08-04 | End: 2021-08-04 | Stop reason: SDUPTHER

## 2021-08-04 RX ORDER — FENTANYL CITRATE 50 UG/ML
INJECTION, SOLUTION INTRAMUSCULAR; INTRAVENOUS PRN
Status: DISCONTINUED | OUTPATIENT
Start: 2021-08-04 | End: 2021-08-04 | Stop reason: SDUPTHER

## 2021-08-04 RX ORDER — MIDAZOLAM HYDROCHLORIDE 1 MG/ML
INJECTION INTRAMUSCULAR; INTRAVENOUS PRN
Status: DISCONTINUED | OUTPATIENT
Start: 2021-08-04 | End: 2021-08-04 | Stop reason: SDUPTHER

## 2021-08-04 RX ADMIN — LIDOCAINE HYDROCHLORIDE 60 MG: 20 INJECTION, SOLUTION INTRAVENOUS at 07:03

## 2021-08-04 RX ADMIN — Medication 0.5 MG: at 08:18

## 2021-08-04 RX ADMIN — HYDROMORPHONE HYDROCHLORIDE 0.5 MG: 1 INJECTION, SOLUTION INTRAMUSCULAR; INTRAVENOUS; SUBCUTANEOUS at 08:05

## 2021-08-04 RX ADMIN — FENTANYL CITRATE 50 MCG: 50 INJECTION, SOLUTION INTRAMUSCULAR; INTRAVENOUS at 07:03

## 2021-08-04 RX ADMIN — FENTANYL CITRATE 50 MCG: 50 INJECTION, SOLUTION INTRAMUSCULAR; INTRAVENOUS at 07:19

## 2021-08-04 RX ADMIN — MIDAZOLAM 2 MG: 1 INJECTION INTRAMUSCULAR; INTRAVENOUS at 06:56

## 2021-08-04 RX ADMIN — Medication 0.5 MG: at 08:05

## 2021-08-04 RX ADMIN — HYDROMORPHONE HYDROCHLORIDE 0.5 MG: 1 INJECTION, SOLUTION INTRAMUSCULAR; INTRAVENOUS; SUBCUTANEOUS at 08:18

## 2021-08-04 RX ADMIN — PROPOFOL 120 MG: 10 INJECTION, EMULSION INTRAVENOUS at 07:03

## 2021-08-04 RX ADMIN — ONDANSETRON 4 MG: 2 INJECTION INTRAMUSCULAR; INTRAVENOUS at 07:21

## 2021-08-04 RX ADMIN — DEXAMETHASONE SODIUM PHOSPHATE 4 MG: 4 INJECTION, SOLUTION INTRAMUSCULAR; INTRAVENOUS at 07:17

## 2021-08-04 RX ADMIN — Medication 2000 MG: at 07:06

## 2021-08-04 RX ADMIN — FENTANYL CITRATE 50 MCG: 50 INJECTION, SOLUTION INTRAMUSCULAR; INTRAVENOUS at 07:35

## 2021-08-04 RX ADMIN — SODIUM CHLORIDE: 9 INJECTION, SOLUTION INTRAVENOUS at 06:56

## 2021-08-04 ASSESSMENT — PULMONARY FUNCTION TESTS
PIF_VALUE: 4
PIF_VALUE: 1
PIF_VALUE: 4
PIF_VALUE: 10
PIF_VALUE: 4
PIF_VALUE: 2
PIF_VALUE: 1
PIF_VALUE: 2
PIF_VALUE: 15
PIF_VALUE: 17
PIF_VALUE: 15
PIF_VALUE: 19
PIF_VALUE: 6
PIF_VALUE: 5
PIF_VALUE: 6
PIF_VALUE: 7
PIF_VALUE: 12
PIF_VALUE: 2
PIF_VALUE: 13
PIF_VALUE: 15
PIF_VALUE: 8
PIF_VALUE: 17
PIF_VALUE: 5
PIF_VALUE: 7
PIF_VALUE: 7
PIF_VALUE: 8
PIF_VALUE: 12
PIF_VALUE: 5
PIF_VALUE: 11
PIF_VALUE: 3
PIF_VALUE: 7
PIF_VALUE: 15
PIF_VALUE: 2
PIF_VALUE: 7
PIF_VALUE: 6
PIF_VALUE: 20
PIF_VALUE: 7
PIF_VALUE: 6
PIF_VALUE: 1
PIF_VALUE: 16
PIF_VALUE: 0
PIF_VALUE: 6
PIF_VALUE: 6
PIF_VALUE: 5
PIF_VALUE: 1
PIF_VALUE: 4
PIF_VALUE: 14
PIF_VALUE: 7

## 2021-08-04 ASSESSMENT — PAIN DESCRIPTION - DESCRIPTORS
DESCRIPTORS: ACHING;DISCOMFORT
DESCRIPTORS: ACHING
DESCRIPTORS: SORE
DESCRIPTORS: ACHING;DISCOMFORT
DESCRIPTORS: ACHING;DISCOMFORT

## 2021-08-04 ASSESSMENT — PAIN DESCRIPTION - ORIENTATION
ORIENTATION: LEFT

## 2021-08-04 ASSESSMENT — PAIN DESCRIPTION - PROGRESSION
CLINICAL_PROGRESSION: GRADUALLY IMPROVING
CLINICAL_PROGRESSION: GRADUALLY WORSENING
CLINICAL_PROGRESSION: GRADUALLY IMPROVING

## 2021-08-04 ASSESSMENT — PAIN DESCRIPTION - PAIN TYPE
TYPE: SURGICAL PAIN

## 2021-08-04 ASSESSMENT — PAIN DESCRIPTION - ONSET
ONSET: AWAKENED FROM SLEEP
ONSET: ON-GOING
ONSET: ON-GOING

## 2021-08-04 ASSESSMENT — PAIN SCALES - GENERAL
PAINLEVEL_OUTOF10: 2
PAINLEVEL_OUTOF10: 7

## 2021-08-04 ASSESSMENT — PAIN - FUNCTIONAL ASSESSMENT
PAIN_FUNCTIONAL_ASSESSMENT: 0-10
PAIN_FUNCTIONAL_ASSESSMENT: ACTIVITIES ARE NOT PREVENTED
PAIN_FUNCTIONAL_ASSESSMENT: PREVENTS OR INTERFERES SOME ACTIVE ACTIVITIES AND ADLS
PAIN_FUNCTIONAL_ASSESSMENT: PREVENTS OR INTERFERES SOME ACTIVE ACTIVITIES AND ADLS

## 2021-08-04 ASSESSMENT — ENCOUNTER SYMPTOMS
SHORTNESS OF BREATH: 1
DYSPNEA ACTIVITY LEVEL: AFTER AMBULATING 1 FLIGHT OF STAIRS

## 2021-08-04 ASSESSMENT — PAIN DESCRIPTION - LOCATION
LOCATION: KNEE

## 2021-08-04 ASSESSMENT — PAIN DESCRIPTION - FREQUENCY
FREQUENCY: INTERMITTENT
FREQUENCY: OTHER (COMMENT)
FREQUENCY: INTERMITTENT

## 2021-08-04 ASSESSMENT — LIFESTYLE VARIABLES: SMOKING_STATUS: 1

## 2021-08-04 ASSESSMENT — COPD QUESTIONNAIRES: CAT_SEVERITY: MODERATE

## 2021-08-04 NOTE — ANESTHESIA PRE PROCEDURE
Department of Anesthesiology  Preprocedure Note       Name:  Alexsandra Calderon   Age:  60 y.o.  :  1955                                          MRN:  [id number]         Date:  2021      Surgeon: Karlo Melgar): Andie Muller DO    Procedure: ARTHROSCOPY LEFT KNEE, MEDIAL MENISECTOMY AND DEBRIDEMENT. Medications prior to admission:   Prior to Admission medications    Medication Sig Start Date End Date Taking? Authorizing Provider   ondansetron (ZOFRAN-ODT) 4 MG disintegrating tablet Take 1 tablet by mouth every 8 hours as needed for Nausea or Vomiting 20   Monae Raymundo MD   budesonide-formoterol [facility]) 160-4.5 MCG/ACT AERO Inhale 2 puffs into the lungs 2 times daily    Historical Provider, MD   Multiple Vitamins-Minerals (THERAPEUTIC MULTIVITAMIN-MINERALS) tablet Take 1 tablet by mouth daily  Patient not taking: Reported on 2021    Historical Provider, MD   buPROPion (WELLBUTRIN XL) 150 MG extended release tablet Take 150 mg by mouth every morning    Historical Provider, MD   montelukast (SINGULAIR) 10 MG tablet nightly  20   Historical Provider, MD   ferrous sulfate 325 (65 Fe) MG tablet Take 1 tablet by mouth daily (with breakfast) Take with your multi vitamin daily.   Patient not taking: Reported on 2021   Monae Raymundo MD   vitamin D (ERGOCALCIFEROL) 1.25 MG (47968 UT) CAPS capsule Take 1 capsule by mouth Twice a Week  Patient not taking: Reported on 2021   Monae Raymundo MD   lisinopril-hydrochlorothiazide (PRINZIDE;ZESTORETIC) 20-12.5 MG per tablet Take 1 tablet by mouth daily    Historical Provider, MD   DULoxetine (CYMBALTA) 60 MG extended release capsule Take 60 mg by mouth daily    Historical Provider, MD   cetirizine (ZYRTEC) 10 MG tablet Take 10 mg by mouth daily    Historical Provider, MD   fluticasone (FLONASE) 50 MCG/ACT nasal spray 1 spray by Nasal route daily 3/7/17   Eunice Parker DO   meloxicam (MOBIC) 7.5 MG tablet Take 7.5 mg by mouth daily     Historical Provider, MD   oxybutynin (DITROPAN) 5 MG tablet Take 5 mg by mouth 3 times daily     Historical Provider, MD   ezetimibe (ZETIA) 10 MG tablet Take 10 mg by mouth daily    Historical Provider, MD   omeprazole (PRILOSEC) 20 MG capsule Take 20 mg by mouth daily. Historical Provider, MD   lovastatin (MEVACOR) 40 MG tablet Take 40 mg by mouth nightly. Historical Provider, MD   metoprolol (LOPRESSOR) 25 MG tablet Take 25 mg by mouth nightly. Historical Provider, MD   metFORMIN (GLUCOPHAGE) 500 MG tablet Take 500 mg by mouth daily (with breakfast)     Historical Provider, MD   levothyroxine (SYNTHROID) 50 MCG tablet Take 75 mcg by mouth Daily     Historical Provider, MD   gabapentin (NEURONTIN) 600 MG tablet Take 600 mg by mouth 3 times daily. Historical Provider, MD   sertraline (ZOLOFT) 100 MG tablet Take 100 mg by mouth nightly. Takes 1and 1/2 tablets nightly    Historical Provider, MD   albuterol (PROVENTIL HFA;VENTOLIN HFA) 108 (90 BASE) MCG/ACT inhaler Inhale 2 puffs into the lungs every 6 hours as needed. Historical Provider, MD   aspirin 81 MG tablet Take 81 mg by mouth daily. Historical Provider, MD       Current medications:    No current facility-administered medications for this visit. No current outpatient medications on file. Facility-Administered Medications Ordered in Other Visits   Medication Dose Route Frequency Provider Last Rate Last Admin    ceFAZolin (ANCEF) 2000 mg in sterile water 20 mL IV syringe  2,000 mg Intravenous Once Linn Ovalles APRN - CNP        0.9 % sodium chloride infusion   Intravenous Continuous Ana Fatima MD           Allergies:     Allergies   Allergen Reactions    Food Other (See Comments)     Lactose Intolerance - Milk and Ice Cream Only    Milk-Related Compounds Other (See Comments)     Intolerance      Other      Enviromental      Codeine Nausea And Vomiting     itching       Problem List:    Patient Active Problem List   Diagnosis Code    Gastroesophageal reflux disease without esophagitis K21.9    Type 2 diabetes mellitus with stage 3 chronic kidney disease, without long-term current use of insulin ([facility]) E11.22, N18.30    Essential hypertension I10    Mixed hyperlipidemia E78.2    Chronic obstructive pulmonary disease ([facility] J44.9    Moderate obesity E66.8    Exertional dyspnea R06.00       Past Medical History:        Diagnosis Date    Arthritis     back, cervical neck    Asthma     Breast mass     fibercystic     Chronic sinusitis     Colon polyps     COPD (chronic obstructive pulmonary disease) ([facility])     controlled with inhalers    Depression     Diabetes mellitus ([facility]     Diabetic polyneuropathy ([facility]     Diverticulitis     Diverticulosis     GERD (gastroesophageal reflux disease)     Hyperlipidemia     Hypertension     Mitral valve prolapse     f/u w/ PCP only - no symptoms    Neuropathy     Overactive thyroid gland     Shortness of breath     Sleep apnea     Thyroid disease        Past Surgical History:        Procedure Laterality Date    ABDOMINAL EXPLORATION SURGERY      APPENDECTOMY  1968    BRONCHOSCOPY  Oct 2013    Bronchoscopy and Mediastinoscopy    CARPAL TUNNEL RELEASE      bilat    CHOLECYSTECTOMY, LAPAROSCOPIC N/A 2020    CHOLECYSTECTOMY LAPAROSCOPIC WITH IOC performed by Franca Devine MD at [address] [place] [address]  10/1995    left    TONSILLECTOMY  1972    UPPER GASTROINTESTINAL ENDOSCOPY N/A 2020    EGD BIOPSY performed by Franca Devine MD at [address] History:    Social History     Tobacco Use    Smoking status: Former Smoker     Packs/day: 4.00     Years: 45.00     Pack years: 180.00     Types: Cigarettes     Quit date: 2013     Years since quittin.8    Smokeless tobacco: Never Used    Tobacco comment: Uses E-cig   Substance Use Topics    Alcohol use: No     Comment: 8 cans pop daily Counseling given: Not Answered  Comment: Uses E-cig      Vital Signs (Current): There were no vitals filed for this visit. BP Readings from Last 3 Encounters:   08/04/21 125/72   07/29/21 (!) 120/59   04/02/21 118/68       NPO Status:                                                                                 BMI:   Wt Readings from Last 3 Encounters:   08/04/21 210 lb (95.3 kg)   07/29/21 204 lb (92.5 kg)   07/13/21 210 lb (95.3 kg)     There is no height or weight on file to calculate BMI.    CBC:   Lab Results   Component Value Date    WBC 8.1 07/29/2021    RBC 3.90 07/29/2021    HGB 12.0 07/29/2021    HCT 35.5 07/29/2021    MCV 91.0 07/29/2021    RDW 13.1 07/29/2021     07/29/2021       CMP:   Lab Results   Component Value Date     07/29/2021    K 4.6 07/29/2021    CL 96 07/29/2021    CO2 25 07/29/2021    BUN 19 07/29/2021    CREATININE 1.2 07/29/2021    GFRAA 55 07/29/2021    LABGLOM 45 07/29/2021    GLUCOSE 128 07/29/2021    GLUCOSE 107 03/09/2012    PROT 7.2 01/23/2020    CALCIUM 9.6 07/29/2021    BILITOT 0.4 01/23/2020    ALKPHOS 88 01/23/2020    AST 14 01/23/2020    ALT 11 01/23/2020       POC Tests: No results for input(s): POCGLU, POCNA, POCK, POCCL, POCBUN, POCHEMO, POCHCT in the last 72 hours.     Coags:   Lab Results   Component Value Date    PROTIME 11.2 10/10/2013    INR 1.0 10/10/2013    APTT 28.0 10/10/2013       HCG (If Applicable): No results found for: PREGTESTUR, PREGSERUM, HCG, HCGQUANT     ABGs: No results found for: PHART, PO2ART, PTE6YNF, KFA8XYQ, BEART, G3PWKRED     Type & Screen (If Applicable):  No results found for: LABABO, LABRH    Drug/Infectious Status (If Applicable):  No results found for: HIV, HEPCAB    COVID-19 Screening (If Applicable):   Lab Results   Component Value Date    COVID19 Not Detected 05/08/2020       EKG 6/12/14  Sinus bradycardia   Otherwise normal      ECHO 3/13/20   Conclusions Summary   Left ventricular internal dimensions were normal in diastole and systole. No regional wall motion abnormalities seen. Normal left ventricular ejection fraction. Mild mitral regurgitation is present. Structurally normal aortic valve. Physiologic and/or trace aortic regurgitation is noted. Mild tricuspid regurgitation. CXR 3/11/20  HISTORY: Shortness of breath       TECHNIQUE: PA and lateral  views of the chest were obtained.       FINDINGS:         SUPPORT DEVICES: None.       LUNGS: Clear with no areas of consolidation. Calcified granuloma in   the left lower lung zone is unchanged.       PLEURA: No pneumothorax visualized.       LUNG VOLUMES: Satisfactory inspirator effort.       MEDIASTINAL STRUCTURES: No lymphadenopathy. Normal aortic contour.        HEART SIZE: Normal.        UPPER ABDOMEN: Unremarkable.       BONES AND SOFT TISSUES: No fracture or soft tissue abnormality.            Impression   1. No acute findings. Anesthesia Evaluation  Patient summary reviewed and Nursing notes reviewed   History of anesthetic complications: Pt denies   Airway: Mallampati: IV  TM distance: <3 FB   Neck ROM: limited  Mouth opening: > = 3 FB Dental:    (+) edentulous, upper dentures and lower dentures      Pulmonary:   (+) COPD: moderate,  shortness of breath: no interval change,  sleep apnea: on noncompliant,  decreased breath sounds,  asthma: seasonal asthma, current smoker (former smoker , she soked E Cigarette today AM.)          Patient did not smoke on day of surgery. Cardiovascular:  Exercise tolerance: poor (<4 METS),   (+) hypertension: no interval change, valvular problems/murmurs: MR, ELIZABETH: after ambulating 1 flight of stairs, murmur, hyperlipidemia      ECG reviewed  Rhythm: regular  Rate: normal  Echocardiogram reviewed         Beta Blocker:  Dose within 24 Hrs      ROS comment: Pt denies any palpitations. EKG: Normal Sinus Rhythm 66.     ECHO:   Left ventricular internal dimensions were normal in diastole and systole. No regional wall motion abnormalities seen. Normal left ventricular ejection fraction. Mild mitral regurgitation is present. Structurally normal aortic valve. Physiologic and/or trace aortic regurgitation is noted. Mild tricuspid regurgitation. Neuro/Psych:   (+) neuromuscular disease (Diabetic neuropathy):, psychiatric history (Depression):             ROS comment: Paresthesias of bilat forearms, hands, and feet per pt report  GI/Hepatic/Renal:   (+) GERD: well controlled, renal disease (CKD stage III):,           Endo/Other:    (+) DiabetesType II DM, no interval change, , hypothyroidism: arthritis: OA., .                 Abdominal:   (+) obese,           Vascular:           ROS comment: Pt denies . Other Findings: Left AC 20 g            Anesthesia Plan      general     ASA 3       Induction: intravenous. BIS  MIPS: Postoperative opioids intended and Prophylactic antiemetics administered. Anesthetic plan and risks discussed with patient. Use of blood products discussed with patient whom consented to blood products. Plan discussed with CRNA.     Attending anesthesiologist reviewed and agrees with Leland Mcfarlane MD   8/4/2021

## 2021-08-04 NOTE — OP NOTE
Operative Note      Patient: Austin Westfall  YOB: 1955  MRN: [id number]    Date of Procedure: 8/4/2021    Pre-Op Diagnosis: LEFT MEDIAL MENISCUS TEAR, LEFT KNEE DJD    Post-Op Diagnosis: Same       Procedure(s):  LEFT KNEE ARTHROSCOPY, MEDIAL MENISCECTOMY AND DEBRIDEMENT, lateral meniscus tear, and chondroplasty    Surgeon(s): Maxi Wilkes DO    Assistant:   Resident: Tae Yu DO; Frances Cherry DO    Anesthesia: General    Estimated Blood Loss (mL): Minimal    Complications: None    Specimens:   * No specimens in log *    Implants:  * No implants in log *      Drains: * No LDAs found *    Findings: as above    Detailed Description of Procedure:   below      PREOPERATIVE DIAGNOSES: (1) Left knee medial meniscus tear. (2)Tricompartmental synovitis. (3) DJD knee   POSTOPERATIVE DIAGNOSIS: Same as above +  PROCEDURE: (1) Left knee arthroscopy with posterior horn medial   meniscectomy. (2) Tricompartmental synovectomy. (3) Chondroplasty of mfc/lfc  ANESTHESIA: general  ESTIMATED BLOOD LOSS: Minimal.   COMPLICATIONS: None. OPERATIVE PROCEDURE: The patient was taken to the operative suite and was   given general anesthesia. The Left knee was identified with preoperative time-out. I applied a tourniquet to the  thigh, placed the  leg in a legholder, prepped and draped the leg in sterile fashion. I outlined an   incision along the anteromedial and anterolateral aspects of the knee. An incision was then made in the anterior medial and lateral aspects of the knee with an 11 blade. A blunt trocar was then inserted within the knee and I carried out a diagnostic arthroscopy. The patient had evidence of synovitis within the suprapatellar pouch, medial and lateral aspects of the knee. There was a large suprapatellar, medial and infrapatellar plica. The patellar surface was stable  and trochlear surface was stable. I then advanced the scope into the intracondylar notch.   The patients ACL/ PCL were intact. I then advanced the scope into the medial compartment. The medial femoral condyle had grade III-IV defects measuring 3x2cm. The medial tibial plateau had grade III-IV defects measuring 2x2cm. There was tear involving the body and posterior horn of the medial meniscus which was unstable to probing. I then advanced the scope into the lateral compartment compartment. The lateral femoral condyle had grade III-IV defects measuring 2x2cm. The lateral tibial plateau had grade II defects measuring 2x2cm. There was tear involving the body and posterior horn of the lateral meniscus which was unstable to probing. I then established a medial working portal and carried out a tricompartmental   synovectomy removing angry synovium from the suprapatellar pouch, medial and   lateral compartments. The suprapatellar/medial and infrapatellar plica were removed using the 4-0 shaver. The articular surface defects involving the mfc/lfc  were performed using the 4.0 Aggressive Plus shaver in a forward manner, smoothed all unstable articular cartilage. The medial and lateral meniscus cartilage was debrided using the 4-0 aggressive plus shaver back to a nice contour. The incisions were then closed with a 4-0 Prolene in single interrupted fashion. A   sterile dressing was placed on the wound. Patient recovered in the recovery   room without difficulty.      Electronically signed by Jason Scherer DO on 8/4/2021 at 7:07 AM

## 2021-08-04 NOTE — PROGRESS NOTES
Fitted for crutches, pt states she has used them before. Requesting discharge, met criteria. Instructions reviewed, prescription given. Pt declines pain pill here. Wheeled to the bathroom, voided.

## 2021-08-04 NOTE — H&P
Department of Orthopedic Surgery  Resident H&P Note          CHIEF COMPLAINT:  Left knee pain    HISTORY OF PRESENT ILLNESS:                 The patient is a 60 y.o. female complaining of left knee pain . The patient has undergone the appropriate course of conservative therapy without resolution of symptoms. Risks and benefits of surgical intervention were discussed with the patient and they demonstrated understanding.      Past Medical History:        Diagnosis Date    Arthritis     back, cervical neck    Asthma     Breast mass     fibercystic     Chronic sinusitis     Colon polyps     COPD (chronic obstructive pulmonary disease) ([facility])     controlled with inhalers    Depression     Diabetes mellitus ([address]     Diabetic polyneuropathy ([facility])     Diverticulitis     Diverticulosis     GERD (gastroesophageal reflux disease)     Hyperlipidemia     Hypertension     Mitral valve prolapse     f/u w/ PCP only - no symptoms    Neuropathy     Overactive thyroid gland     Shortness of breath     Sleep apnea     Thyroid disease      Past Surgical History:        Procedure Laterality Date    ABDOMINAL EXPLORATION SURGERY      APPENDECTOMY  1968    BRONCHOSCOPY  Oct 2013    Bronchoscopy and Mediastinoscopy    CARPAL TUNNEL RELEASE      bilat    CHOLECYSTECTOMY, LAPAROSCOPIC N/A 5/12/2020    CHOLECYSTECTOMY LAPAROSCOPIC WITH IOC performed by Jemima Lorenzo MD at [address] [place] [address]  10/1995    left   [address] [place] [address] [address] ENDOSCOPY N/A 1/23/2020    EGD BIOPSY performed by Jemima Lorenzo MD at [place] [address]     Current Medications:   Current Facility-Administered Medications: ceFAZolin (ANCEF) 2000 mg in sterile water 20 mL IV syringe, 2,000 mg, Intravenous, Once  0.9 % sodium chloride infusion, , Intravenous, Continuous  meperidine (DEMEROL) injection 12.5 mg, 12.5 mg, Intravenous, Q5 Min PRN  HYDROmorphone (DILAUDID) injection 0.5 mg, 0.5 mg, Intravenous, Q5 Min PRN  Allergies:  Food, Milk-related compounds, Other, and Codeine    Social History:   TOBACCO:   reports that she quit smoking about 7 years ago. Her smoking use included cigarettes. She has a 180.00 pack-year smoking history. She has never used smokeless tobacco.  ETOH:   reports no history of alcohol use. DRUGS:   reports no history of drug use.   ACTIVITIES OF DAILY LIVING:    OCCUPATION:    Family History:       Problem Relation Age of Onset    Cancer Mother     Cancer Father     Cancer Maternal Grandmother     Cancer Maternal Grandfather     No Known Problems Paternal Grandfather     No Known Problems Paternal Grandmother        REVIEW OF SYSTEMS:  CONSTITUTIONAL:  negative for  fevers, chills and fatigue  EYES:  negative for  double vision, blurred vision, eye discharge, visual disturbance, redness and icterus  HEENT:  negative for  hearing loss, tinnitus, ear drainage, earaches, epistaxis, sore throat and voice change  RESPIRATORY:  negative for  dyspnea, wheezing, hemoptysis, chest pain, pleuritic pain and cyanosis  CARDIOVASCULAR:  negative for  chest pain, dyspnea, palpitations, syncope  GASTROINTESTINAL:  negative for nausea, vomiting, change in bowel habits, diarrhea, constipation, abdominal pain, hematemesis and hemtochezia  GENITOURINARY:  negative for frequency, urinary incontinence and hematuria  HEMATOLOGIC/LYMPHATIC:  negative for bleeding and petechiae  MUSCULOSKELETAL:  positive for  Left knee pain  NEUROLOGICAL:  negative for headaches, dizziness, visual disturbance, weakness, syncope and near syncope  BEHAVIOR/PSYCH:  negative for agitated, increased agitation and anxiety    PHYSICAL EXAM:    VITALS:  /72   Pulse 75   Temp 97.1 °F (36.2 °C) (Infrared)   Resp 16   Ht 5' 1\" (1.549 m)   Wt 210 lb (95.3 kg)   LMP 01/01/1993   SpO2 97%   BMI 39.68 kg/m²   CONSTITUTIONAL:  awake, alert, cooperative, no apparent distress, and appears stated age  EYES:  Lids and lashes normal, extra ocular muscles intact, sclera clear, conjunctiva normal  ENT:  Normocephalic, without obvious abnormality, atraumatic, external ears without lesions. NECK:  Supple, symmetrical, trachea midline, no adenopathy, skin normal  LUNGS:  No increased work of breathing, symmetric expansion, good air exchange. CARDIOVASCULAR:  Regular rate and rhythm  ABDOMEN:  Soft, non-distended, non-tender, no masses palpated  NEUROLOGIC:  Awake, alert, oriented to name, place and time. Cranial nerves II-XII are grossly intact. Motor is 5 out of 5 bilaterally. Sensory is intact. Babinski down going. MUSCULOSKELETAL:  Left lower Extremity:   · Skin intact  · Compartments soft and compressible  · +2 dp/pt pulses  · SILT dp/sp/pt/s/s nerves  · +AROM pf/df/ehl    DATA:    CBC:   Lab Results   Component Value Date    WBC 8.1 07/29/2021    RBC 3.90 07/29/2021    HGB 12.0 07/29/2021    HCT 35.5 07/29/2021    MCV 91.0 07/29/2021    MCH 30.8 07/29/2021    MCHC 33.8 07/29/2021    RDW 13.1 07/29/2021     07/29/2021    MPV 9.0 07/29/2021     PT/INR:    Lab Results   Component Value Date    PROTIME 11.2 10/10/2013    INR 1.0 10/10/2013         IMPRESSION:  · Internal derangement left knee    PLAN:  ·  explained the risks and complications of the recommended surgery with the patient at length, as well as discussed potential treatment alternatives including nonoperative management. These risks include but are not limited to death or complication from anesthesia, continued pain, nerve tendon or vascular injury, infection, hematoma, deep vein thrombosis or pulmonary embolism, and need for further surgery, etc. Patient understood this, asked appropriate questions, which were all answered, and elected to proceed with the procedure.   · Discussed with Dr. Daniele Alexander

## 2021-08-04 NOTE — ANESTHESIA POSTPROCEDURE EVALUATION
Department of Anesthesiology  Postprocedure Note    Patient: Marck Dinh  MRN: [id number]  YOB: 1955  Date of evaluation: 8/4/2021  Time:  10:25 AM     Procedure Summary     Date: 08/04/21 Room / Location: [address] [place] [address] 03 [address] [facility]    Anesthesia Start: 8589 Anesthesia Stop: 9272    Procedure: LEFT KNEE ARTHROSCOPY, MEDIAL MENISCECTOMY AND DEBRIDEMENT (Left Knee) Diagnosis: (LEFT MEDIAL MENISCUS TEAR, LEFT KNEE DJD)    Surgeons: Young Canales DO Responsible Provider: Cozetta Schilder, MD    Anesthesia Type: general ASA Status: 3          Anesthesia Type: general    Hillary Phase I: Hillary Score: 10    Hillary Phase II: Hillary Score: 10    Last vitals: Reviewed and per EMR flowsheets.        Anesthesia Post Evaluation    Patient location during evaluation: PACU  Patient participation: complete - patient participated  Level of consciousness: awake  Pain score: 0  Airway patency: patent  Nausea & Vomiting: no nausea  Complications: no  Cardiovascular status: blood pressure returned to baseline  Respiratory status: acceptable  Hydration status: euvolemic

## 2021-08-18 ENCOUNTER — OFFICE VISIT (OUTPATIENT)
Dept: ORTHOPEDIC SURGERY | Age: 66
End: 2021-08-18

## 2021-08-18 VITALS — WEIGHT: 200.4 LBS | HEIGHT: 61 IN | BODY MASS INDEX: 37.84 KG/M2 | TEMPERATURE: 98 F

## 2021-08-18 DIAGNOSIS — M17.11 PRIMARY OSTEOARTHRITIS OF RIGHT KNEE: ICD-10-CM

## 2021-08-18 DIAGNOSIS — M17.12 PRIMARY OSTEOARTHRITIS OF LEFT KNEE: ICD-10-CM

## 2021-08-18 DIAGNOSIS — S83.242A ACUTE MEDIAL MENISCUS TEAR, LEFT, INITIAL ENCOUNTER: Primary | ICD-10-CM

## 2021-08-18 PROCEDURE — 99024 POSTOP FOLLOW-UP VISIT: CPT | Performed by: NURSE PRACTITIONER

## 2021-08-18 RX ORDER — MENTHOL 5.8 MG/1
LOZENGE ORAL
COMMUNITY
Start: 2021-08-07

## 2021-08-18 RX ORDER — BUPROPION HYDROCHLORIDE 150 MG/1
TABLET, EXTENDED RELEASE ORAL
COMMUNITY
Start: 2021-07-23 | End: 2021-08-18

## 2021-08-18 RX ORDER — CHOLECALCIFEROL (VITAMIN D3) 1250 MCG
CAPSULE ORAL
COMMUNITY
Start: 2021-08-07 | End: 2022-05-17

## 2021-08-18 NOTE — PROGRESS NOTES
Subjective:        Sallie Arthur is here for follow-up after left knee arthroscopy. Findings at surgery: djd, medial meniscus tear. Pain is controlled with current analgesics. Medication(s) being used: neurontin, and mobic. . She denies fever, wound drainage, increasing redness, pus, increasing pain, increasing swelling. Post op problems reported: none. She is ambulating antalgic. She continues to complain or right knee pain. She has had csi and visco with minimal relief. Objective:           General :    alert, appears stated age and cooperative   Gait:  Antalgic. Sutures:   Sutures in place and will be removed today. Incision:  healing well, no significant drainage, no dehiscence, no significant erythema   Tenderness:  mild   Flexion ROM:  diminished range of motion   Extension ROM:  diminished range of motion   Effusion:  mild   DVT Evaluation:  No evidence of DVT seen on physical exam.      Knee exam:  Right knee exam shows;  range of motion of R. Knee is 0 to 115, and L. Knee is 5 to 115. The patient does have  pain on motion, effusion is mild, there is tenderness over the  global region, there are not any masses, there is not ligamentous instability, there is  deformity noted. Knee exam: bilateral positive for moderate crepitations, some mild tenderness laxity is not noted with  stress. There is not a popliteal cyst.     L. Knee:  Lachman's negative, Anterior Drawer negative, Posterior Drawer negative  Marysol's positive, Thallasy  positive,   PF grind test positive, Apprehension test negative,  Patellar J sign  negative        Assessment:     Encounter Diagnoses   Name Primary?  Acute medial meniscus tear, left, initial encounter Yes    Primary osteoarthritis of left knee     Primary osteoarthritis of right knee          Plan:      Surgical pictures from the surgery were reveiwed with the patient  HEP  Sutures removed today. Follow up: 4 weeks.       The patient has failed conservative measures such as NSAIDS, HEP, and cortisone injections. She is an excellent candidate for Zilretta injections  in the Right knee.  We will contact the patient's insurance company and see them back in the office once we have received approval.

## 2021-09-20 ENCOUNTER — OFFICE VISIT (OUTPATIENT)
Dept: ORTHOPEDIC SURGERY | Age: 66
End: 2021-09-20

## 2021-09-20 VITALS — TEMPERATURE: 98 F | WEIGHT: 200 LBS | HEIGHT: 61 IN | BODY MASS INDEX: 37.76 KG/M2

## 2021-09-20 DIAGNOSIS — M17.12 PRIMARY OSTEOARTHRITIS OF LEFT KNEE: ICD-10-CM

## 2021-09-20 DIAGNOSIS — S83.242A ACUTE MEDIAL MENISCUS TEAR, LEFT, INITIAL ENCOUNTER: Primary | ICD-10-CM

## 2021-09-20 PROCEDURE — 99024 POSTOP FOLLOW-UP VISIT: CPT | Performed by: ORTHOPAEDIC SURGERY

## 2021-10-25 ENCOUNTER — OFFICE VISIT (OUTPATIENT)
Dept: ORTHOPEDIC SURGERY | Age: 66
End: 2021-10-25

## 2021-10-25 VITALS — WEIGHT: 200 LBS | HEIGHT: 61 IN | BODY MASS INDEX: 37.76 KG/M2 | TEMPERATURE: 98 F

## 2021-10-25 DIAGNOSIS — M17.12 PRIMARY OSTEOARTHRITIS OF LEFT KNEE: Primary | ICD-10-CM

## 2021-10-25 PROCEDURE — 99024 POSTOP FOLLOW-UP VISIT: CPT | Performed by: ORTHOPAEDIC SURGERY

## 2021-10-25 NOTE — PROGRESS NOTES
Chief Complaint   Patient presents with    Knee Pain     Left knee to discuss L TKA mid November        Jaciel Cevallos returns today for follow-up of her left knee pain. she reports this is worse than when I saw her last.  The patient's pain level is a 10/10. The previous treatment was not successful.     Past Medical History:   Diagnosis Date    Arthritis     back, cervical neck    Asthma     Breast mass     fibercystic     Chronic sinusitis     Colon polyps     COPD (chronic obstructive pulmonary disease) (HCC)     controlled with inhalers    Depression     Diabetes mellitus ([address]     [facility]uropathy ([facility])     Diverticulitis     Diverticulosis     GERD (gastroesophageal reflux disease)     Hyperlipidemia     Hypertension     Mitral valve prolapse     f/u w/ PCP only - no symptoms    Neuropathy     Overactive thyroid gland     Shortness of breath     Sleep apnea     Thyroid disease      Past Surgical History:   Procedure Laterality Date    ABDOMINAL EXPLORATION SURGERY      APPENDECTOMY  1968    BRONCHOSCOPY  Oct 2013    Bronchoscopy and Mediastinoscopy    CARPAL TUNNEL RELEASE      bilat    CHOLECYSTECTOMY, LAPAROSCOPIC N/A 5/12/2020    CHOLECYSTECTOMY LAPAROSCOPIC WITH IOC performed by Hipolito Horta MD at [facility] ARTHROSCOPY Left 8/4/2021    LEFT KNEE ARTHROSCOPY, MEDIAL MENISCECTOMY AND DEBRIDEMENT performed by Abraham Cowan DO at [place] [address]  10/1995    left   [facility]    UPPER GASTROINTESTINAL ENDOSCOPY N/A 1/23/2020    EGD BIOPSY performed by Hipolito Horta MD at [place] [address]       Current Outpatient Medications:     Cholecalciferol (VITAMIN D3) 1.25 MG (74247 UT) CAPS, take 1 capsule by mouth THREE TIMES A WEEK, Disp: , Rfl:     ondansetron (ZOFRAN-ODT) 4 MG disintegrating tablet, Take 1 tablet by mouth every 8 hours as needed for Nausea or Vomiting, Disp: 20 tablet, Rfl: 1    budesonide-formoterol (SYMBICORT) 160-4.5 MCG/ACT AERO, Inhale 2 puffs into the lungs 2 times daily, Disp: , Rfl:     buPROPion (WELLBUTRIN XL) 150 MG extended release tablet, Take 150 mg by mouth every morning, Disp: , Rfl:     montelukast (SINGULAIR) 10 MG tablet, nightly , Disp: , Rfl:     vitamin D (ERGOCALCIFEROL) 1.25 MG (33678 UT) CAPS capsule, Take 1 capsule by mouth Twice a Week, Disp: 24 capsule, Rfl: 0    lisinopril-hydrochlorothiazide (PRINZIDE;ZESTORETIC) 20-12.5 MG per tablet, Take 1 tablet by mouth daily, Disp: , Rfl:     DULoxetine (CYMBALTA) 60 MG extended release capsule, Take 60 mg by mouth daily, Disp: , Rfl:     cetirizine (ZYRTEC) 10 MG tablet, Take 10 mg by mouth daily, Disp: , Rfl:     fluticasone (FLONASE) 50 MCG/ACT nasal spray, 1 spray by Nasal route daily, Disp: 1 Bottle, Rfl: 3    meloxicam (MOBIC) 7.5 MG tablet, Take 7.5 mg by mouth daily , Disp: , Rfl:     oxybutynin (DITROPAN) 5 MG tablet, Take 5 mg by mouth 3 times daily , Disp: , Rfl:     ezetimibe (ZETIA) 10 MG tablet, Take 10 mg by mouth daily, Disp: , Rfl:     omeprazole (PRILOSEC) 20 MG capsule, Take 20 mg by mouth daily. , Disp: , Rfl:     lovastatin (MEVACOR) 40 MG tablet, Take 40 mg by mouth nightly., Disp: , Rfl:     metoprolol (LOPRESSOR) 25 MG tablet, Take 25 mg by mouth nightly., Disp: , Rfl:     metFORMIN (GLUCOPHAGE) 500 MG tablet, Take 500 mg by mouth daily (with breakfast) , Disp: , Rfl:     levothyroxine (SYNTHROID) 50 MCG tablet, Take 75 mcg by mouth Daily , Disp: , Rfl:     gabapentin (NEURONTIN) 600 MG tablet, Take 600 mg by mouth 3 times daily. , Disp: , Rfl:     sertraline (ZOLOFT) 100 MG tablet, Take 100 mg by mouth nightly. Takes 1and 1/2 tablets nightly, Disp: , Rfl:     albuterol (PROVENTIL HFA;VENTOLIN HFA) 108 (90 BASE) MCG/ACT inhaler, Inhale 2 puffs into the lungs every 6 hours as needed. , Disp: , Rfl:     aspirin 81 MG tablet, Take 81 mg by mouth daily. , Disp: , Rfl:     RA VITAMIN B-12 100 MCG tablet, take 1 tablet Connections:     Frequency of Communication with Friends and Family:     Frequency of Social Gatherings with Friends and Family:     Attends [employer] Services:     Active Member of Clubs or Organizations:     Attends Club or Organization Meetings:     Marital Status:    Intimate Partner Violence:     Fear of Current or Ex-Partner:     Emotionally Abused:     Physically Abused:     Sexually Abused:      Family History   Problem Relation Age of Onset    Cancer Mother     Cancer Father     Cancer Maternal Grandmother     Cancer Maternal Grandfather     No Known Problems Paternal Grandfather     No Known Problems Paternal Grandmother        Review of Systems:     Skin: (-) rash,(-) psoriasis,(-) eczema, (-)skin cancer. Musculoskeletal: (-) fractures,  (-) dislocations,(-) collagen vascular disease, (-) fibromyalgia, (-) multiple sclerosis, (-) muscular dystrophy, (-) RSD,(-) joint pain (-)swelling, (-) joint pain,swelling. Neurologic: (-) epilepsy, (-)seizures,(-) brain tumor,(-) TIA, (-)stroke, (-)headaches, (-)Parkinson disease,(-) memory loss, (-) LOC. Cardiovascular: (-) Chest pain, (-) swelling in legs/feet, (-) SOB, (-) cramping in legs/feet with walking. Constitutional:    _Temp 98 °F (36.7 °C)   Ht 5' 1\" (1.549 m)   Wt 200 lb (90.7 kg)   LMP 01/01/1993   BMI 37.79 kg/m²  Vital signs are stable. In general, patient is awake, alert and oriented X3, in no apparent distress. Examination of HENT reveals normocephalic, atraumatic. PERRLA/EOMI sclera are white. Conjunctivae are clear. TM's are intact. Pharynx is pink and moist.  Uvula and tongue are midline. Heart: Positive S1 and positive S2 with regular rate and rhythm. Lungs: Clear to auscultation bilaterally without rales, rhonchi or wheezes. Abdomen: soft, nontender. Positive bowel sounds. No organomegaly. No guarding or rigidity. The patient is alert and oriented x 3, appears to be stated age and in no distress. Temp 98 °F (36.7 °C)   Ht 5' 1\" (1.549 m)   Wt 200 lb (90.7 kg)   [facility] 01/01/1993   BMI 37.79 kg/m²     Skin:  Upon inspection: the skin appears warm, dry and intact. There is not a previous scar over the affected area. There is not any cellulitis, lymphedema or cutaneous lesions noted in the lower extremities. Upon palpation there is no induration noted. Neurologic:  Gait: normal;  Motor exam of the lower extremities show ; quadriceps, hamstrings, foot dorsi and plantar flexors intact R.  5/5 and L. 5/5. Deep tendon reflexes are 2/4 at the knees and 2/4 at the ankles with strong extensor hallicus longus motor strength bilaterally. Sensory to both feet is intact to all sensory roots. Cardiovascular: The vascular exam is normal and is well perfused to distal extremities. Distal pulses DP/PT: R. 2+; L. 2+. There is cap refill noted less than two seconds in all digits. There is not edema of the bilateral lower extremities. There is not varicosities noted in the distal extremities. Lymph:  Upon palpation,  there is no lymphadenopathy noted in bilateral lower extremities. Musculoskeletal:  Gait: antalgic; examination of the nails and digits reveal no cyanosis or clubbing    Lumbar exam:  On visual inspection, there is no deformity of the spine. full range of motion, no tenderness, palpable spasm or pain on motion. Special tests: Straight Leg Raise negative, Sunday testnegative. Hip exam:  Upon inspection, there is no deformity noted. Upon palpation there is not tenderness. ROM: is   full and semetrical.   Strength: Hip Flexors 5/5; Hip Abductors 5/5; Hip Adduction 5/5. Knee exam:  Bilateral knee exam shows;  range of motion of R. Knee is 0 to 115, and L. Knee is 5 to 115. The patient does have  pain on motion, effusion is mild, there is tenderness over the  global region, there are not any masses, there is not ligamentous instability, there is  deformity noted.     Knee exam: bilateral positive for moderate crepitations, some mild tenderness laxity is not noted with  stress. There is not a popliteal cyst.     R. Knee:  Lachman's negative, Anterior Drawer negative, Posterior Drawer negative  Marysol's positive, Thallasy  positive,   PF grind test positive, Apprehension test negative, Patellar J sign  negative  L. Knee:  Lachman's negative, Anterior Drawer negative, Posterior Drawer negative  Marysol's positive, Thallasy  positive,   PF grind test positive, Apprehension test negative,  Patellar J sign  negative     Xray Exam:  Left knee: Moderate osteoarthritis at the medial weight-bearing compartment   with milder involvement elsewhere.  Very small joint effusion.  No fracture   or dislocation.       Right knee: Moderate osteoarthritis at the medial weight-bearing compartment   with milder involvement elsewhere.  No fracture or dislocation.  Normal soft   tissues.      MRI:  Medial meniscus tear.       Tricompartment degenerative changes most significant to the medial   compartment.       Trace knee joint effusion.           Radiographic findings reviewed with patient    Impression:  Encounter Diagnosis   Name Primary?  Primary osteoarthritis of left knee Yes       Plan:   Natural history and expected course discussed. Questions answered. Educational materials distributed. Rest, ice, compression, and elevation (RICE) therapy. Reduction in offending activity. I had a lengthy discussion with the patient regarding their diagnosis. I explained treatment options including surgical vs non surgical treatment. I reviewed in detail the risks and benefits and outlined the procedure in detail with expected outcomes and possible complications. I also discussed non surgical treatment such as injections (CSI and visco supplementation), physical therapy, topical creams and NSAID's. They have elected for surgical management at this time.    We discussed various treatment options both surgical and non-surgical.   The patient is unable to ambulate more than 100 feet and is unable to perform the average daily activities including:  Light housework, ADLs, donning clothes, toileting and exercise. Patient has failed previous conservative measures including cortisone injections, NSAIDs, PT, HEP and pain medication and is currently a fall risk to the disability and decreased functioning. The patient wishes to have the total knee arthroplasty. The risks and benefits of a total knee replacement were discussed with the patient. The risks include but are not limited to: infection, injury to blood vessels and nerves, non relief of symptoms, arthrofibrosis of knee, aseptic loosening of prosthesis, intraoperative fracture, blood loss, PE/DVT, MI, dislocation of hip and knee, need for further operative intervention and death. The patient is aware of the risks and wished to proceed with a Left total knee replacement 11/10/21. We will obtain medical clearence from the PCP. At least 30 minutes was spent discussing the diagnosis and treatment options with the patient with at least 50% of the time was spent with decision making and counseling the patient. The patient was counseled at length about the risks of félix Covid-19 during their perioperative period and any recovery window from their procedure. The patient was made aware that félix Covid-19  may worsen their prognosis for recovering from their procedure  and lend to a higher morbidity and/or mortality risk. All material risks, benefits, and reasonable alternatives including postponing the procedure were discussed. The patient does wish to proceed with the procedure at this time.

## 2021-10-31 ASSESSMENT — PROMIS GLOBAL HEALTH SCALE
IN GENERAL, HOW WOULD YOU RATE YOUR PHYSICAL HEALTH [ON A SCALE OF 1 (POOR) TO 5 (EXCELLENT)]?: 3
IN THE PAST 7 DAYS, HOW WOULD YOU RATE YOUR PAIN ON AVERAGE [ON A SCALE FROM 0 (NO PAIN) TO 10 (WORST IMAGINABLE PAIN)]?: 8
SUM OF RESPONSES TO QUESTIONS 2, 4, 5, & 10: 14
TO WHAT EXTENT ARE YOU ABLE TO CARRY OUT YOUR EVERYDAY PHYSICAL ACTIVITIES SUCH AS WALKING, CLIMBING STAIRS, CARRYING GROCERIES, OR MOVING A CHAIR [ON A SCALE OF 1 (NOT AT ALL) TO 5 (COMPLETELY)]?: 2
IN THE PAST 7 DAYS, HOW OFTEN HAVE YOU BEEN BOTHERED BY EMOTIONAL PROBLEMS, SUCH AS FEELING ANXIOUS, DEPRESSED, OR IRRITABLE [ON A SCALE FROM 1 (NEVER) TO 5 (ALWAYS)]?: 3
IN THE PAST 7 DAYS, HOW WOULD YOU RATE YOUR FATIGUE ON AVERAGE [ON A SCALE FROM 1 (NONE) TO 5 (VERY SEVERE)]?: 3
WHO IS THE PERSON COMPLETING THE PROMIS V1.1 SURVEY?: 0
HOW IS THE PROMIS V1.1 BEING ADMINISTERED?: 2
IN GENERAL, PLEASE RATE HOW WELL YOU CARRY OUT YOUR USUAL SOCIAL ACTIVITIES (INCLUDES ACTIVITIES AT HOME, AT WORK, AND IN YOUR COMMUNITY, AND RESPONSIBILITIES AS A PARENT, CHILD, SPOUSE, EMPLOYEE, FRIEND, ETC) [ON A SCALE OF 1 (POOR) TO 5 (EXCELLENT)]?: 4
IN GENERAL, HOW WOULD YOU RATE YOUR MENTAL HEALTH, INCLUDING YOUR MOOD AND YOUR ABILITY TO THINK [ON A SCALE OF 1 (POOR) TO 5 (EXCELLENT)]?: 4
IN GENERAL, WOULD YOU SAY YOUR HEALTH IS...[ON A SCALE OF 1 (POOR) TO 5 (EXCELLENT)]: 3
IN GENERAL, HOW WOULD YOU RATE YOUR SATISFACTION WITH YOUR SOCIAL ACTIVITIES AND RELATIONSHIPS [ON A SCALE OF 1 (POOR) TO 5 (EXCELLENT)]?: 4
SUM OF RESPONSES TO QUESTIONS 3, 6, 7, & 8: 16
IN GENERAL, WOULD YOU SAY YOUR QUALITY OF LIFE IS...[ON A SCALE OF 1 (POOR) TO 5 (EXCELLENT)]: 3

## 2021-10-31 ASSESSMENT — KOOS JR
STANDING UPRIGHT: 2
HOW SEVERE IS YOUR KNEE STIFFNESS AFTER FIRST WAKING IN MORNING: 2
STRAIGHTENING KNEE FULLY: 1
BENDING TO THE FLOOR TO PICK UP OBJECT: 2
GOING UP OR DOWN STAIRS: 2
TWISING OR PIVOTING ON KNEE: 3
RISING FROM SITTING: 1

## 2021-11-01 RX ORDER — PREGABALIN 75 MG/1
75 CAPSULE ORAL ONCE
Status: CANCELLED | OUTPATIENT
Start: 2021-11-01 | End: 2021-11-01

## 2021-11-03 RX ORDER — SODIUM CHLORIDE 9 MG/ML
INJECTION, SOLUTION INTRAVENOUS CONTINUOUS
Status: CANCELLED | OUTPATIENT
Start: 2021-11-03

## 2021-11-05 ENCOUNTER — ANESTHESIA EVENT (OUTPATIENT)
Dept: OPERATING ROOM | Age: 66
End: 2021-11-05
Payer: COMMERCIAL

## 2021-11-05 ENCOUNTER — HOSPITAL ENCOUNTER (OUTPATIENT)
Dept: MAMMOGRAPHY | Age: 66
Discharge: HOME | End: 2021-11-07
Payer: COMMERCIAL

## 2021-11-05 ENCOUNTER — HOSPITAL ENCOUNTER (OUTPATIENT)
Dept: GENERAL RADIOLOGY | Age: 66
Discharge: HOME | End: 2021-11-07
Payer: COMMERCIAL

## 2021-11-05 ENCOUNTER — HOSPITAL ENCOUNTER (OUTPATIENT)
Dept: PREADMISSION TESTING | Age: 66
Discharge: HOME | End: 2021-11-05
Payer: COMMERCIAL

## 2021-11-05 VITALS
SYSTOLIC BLOOD PRESSURE: 109 MMHG | TEMPERATURE: 97.9 F | DIASTOLIC BLOOD PRESSURE: 54 MMHG | BODY MASS INDEX: 37.61 KG/M2 | HEART RATE: 85 BPM | HEIGHT: 61 IN | OXYGEN SATURATION: 99 % | RESPIRATION RATE: 16 BRPM | WEIGHT: 199.2 LBS

## 2021-11-05 DIAGNOSIS — Z01.818 PRE-OP TESTING: Primary | ICD-10-CM

## 2021-11-05 DIAGNOSIS — M81.0 OSTEOPOROSIS, UNSPECIFIED OSTEOPOROSIS TYPE, UNSPECIFIED PATHOLOGICAL FRACTURE PRESENCE: ICD-10-CM

## 2021-11-05 DIAGNOSIS — M81.0 SENILE OSTEOPOROSIS: ICD-10-CM

## 2021-11-05 DIAGNOSIS — M17.12 PRIMARY OSTEOARTHRITIS OF LEFT KNEE: ICD-10-CM

## 2021-11-05 LAB
ABO/RH: NORMAL
ALBUMIN SERPL-MCNC: 4.5 G/DL (ref 3.5–5.2)
ALP BLD-CCNC: 100 U/L (ref 35–104)
ALT SERPL-CCNC: 11 U/L (ref 0–32)
ANION GAP SERPL CALCULATED.3IONS-SCNC: 12 MMOL/L (ref 7–16)
ANTIBODY SCREEN: NORMAL
APTT: 27.3 SEC (ref 24.5–35.1)
AST SERPL-CCNC: 14 U/L (ref 0–31)
BASOPHILS ABSOLUTE: 0.03 E9/L (ref 0–0.2)
BASOPHILS RELATIVE PERCENT: 0.4 % (ref 0–2)
BILIRUB SERPL-MCNC: 0.4 MG/DL (ref 0–1.2)
BILIRUBIN URINE: NEGATIVE
BLOOD, URINE: NEGATIVE
BUN BLDV-MCNC: 17 MG/DL (ref 6–23)
CALCIUM SERPL-MCNC: 9.6 MG/DL (ref 8.6–10.2)
CHLORIDE BLD-SCNC: 97 MMOL/L (ref 98–107)
CLARITY: CLEAR
CO2: 26 MMOL/L (ref 22–29)
COLOR: YELLOW
CREAT SERPL-MCNC: 1 MG/DL (ref 0.5–1)
EOSINOPHILS ABSOLUTE: 0.2 E9/L (ref 0.05–0.5)
EOSINOPHILS RELATIVE PERCENT: 2.5 % (ref 0–6)
GFR AFRICAN AMERICAN: >60
GFR NON-AFRICAN AMERICAN: 56 ML/MIN/1.73
GLUCOSE BLD-MCNC: 159 MG/DL (ref 74–99)
GLUCOSE URINE: NEGATIVE MG/DL
HBA1C MFR BLD: 6.8 % (ref 4–5.6)
HCT VFR BLD CALC: 38.3 % (ref 34–48)
HEMOGLOBIN: 12.8 G/DL (ref 11.5–15.5)
IMMATURE GRANULOCYTES #: 0.03 E9/L
IMMATURE GRANULOCYTES %: 0.4 % (ref 0–5)
INR BLD: 1
KETONES, URINE: NEGATIVE MG/DL
LEUKOCYTE ESTERASE, URINE: NEGATIVE
LYMPHOCYTES ABSOLUTE: 2.2 E9/L (ref 1.5–4)
LYMPHOCYTES RELATIVE PERCENT: 28 % (ref 20–42)
MCH RBC QN AUTO: 30.4 PG (ref 26–35)
MCHC RBC AUTO-ENTMCNC: 33.4 % (ref 32–34.5)
MCV RBC AUTO: 91 FL (ref 80–99.9)
MONOCYTES ABSOLUTE: 0.35 E9/L (ref 0.1–0.95)
MONOCYTES RELATIVE PERCENT: 4.4 % (ref 2–12)
NEUTROPHILS ABSOLUTE: 5.06 E9/L (ref 1.8–7.3)
NEUTROPHILS RELATIVE PERCENT: 64.3 % (ref 43–80)
NITRITE, URINE: NEGATIVE
PDW BLD-RTO: 12.2 FL (ref 11.5–15)
PH UA: 5.5 (ref 5–9)
PLATELET # BLD: 302 E9/L (ref 130–450)
PMV BLD AUTO: 9.2 FL (ref 7–12)
POTASSIUM SERPL-SCNC: 3.8 MMOL/L (ref 3.5–5)
PREALBUMIN: 25 MG/DL (ref 20–40)
PROTEIN UA: NEGATIVE MG/DL
PROTHROMBIN TIME: 11.4 SEC (ref 9.3–12.4)
RBC # BLD: 4.21 E12/L (ref 3.5–5.5)
SODIUM BLD-SCNC: 135 MMOL/L (ref 132–146)
SPECIFIC GRAVITY UA: 1.02 (ref 1–1.03)
TOTAL PROTEIN: 7.6 G/DL (ref 6.4–8.3)
UROBILINOGEN, URINE: 0.2 E.U./DL
WBC # BLD: 7.9 E9/L (ref 4.5–11.5)

## 2021-11-05 PROCEDURE — 36415 COLL VENOUS BLD VENIPUNCTURE: CPT

## 2021-11-05 PROCEDURE — 85730 THROMBOPLASTIN TIME PARTIAL: CPT

## 2021-11-05 PROCEDURE — 84134 ASSAY OF PREALBUMIN: CPT

## 2021-11-05 PROCEDURE — 80053 COMPREHEN METABOLIC PANEL: CPT

## 2021-11-05 PROCEDURE — 86850 RBC ANTIBODY SCREEN: CPT

## 2021-11-05 PROCEDURE — 87081 CULTURE SCREEN ONLY: CPT

## 2021-11-05 PROCEDURE — 87088 URINE BACTERIA CULTURE: CPT

## 2021-11-05 PROCEDURE — 71046 X-RAY EXAM CHEST 2 VIEWS: CPT

## 2021-11-05 PROCEDURE — 85025 COMPLETE CBC W/AUTO DIFF WBC: CPT

## 2021-11-05 PROCEDURE — 85610 PROTHROMBIN TIME: CPT

## 2021-11-05 PROCEDURE — 81003 URINALYSIS AUTO W/O SCOPE: CPT

## 2021-11-05 PROCEDURE — 83036 HEMOGLOBIN GLYCOSYLATED A1C: CPT

## 2021-11-05 PROCEDURE — 86900 BLOOD TYPING SEROLOGIC ABO: CPT

## 2021-11-05 PROCEDURE — 86901 BLOOD TYPING SEROLOGIC RH(D): CPT

## 2021-11-05 PROCEDURE — 77080 DXA BONE DENSITY AXIAL: CPT

## 2021-11-05 RX ORDER — MIDAZOLAM HYDROCHLORIDE 1 MG/ML
1 INJECTION INTRAMUSCULAR; INTRAVENOUS PRN
Status: CANCELLED | OUTPATIENT
Start: 2021-11-05

## 2021-11-05 RX ORDER — FENTANYL CITRATE 50 UG/ML
50 INJECTION, SOLUTION INTRAMUSCULAR; INTRAVENOUS EVERY 10 MIN PRN
Status: CANCELLED | OUTPATIENT
Start: 2021-11-05

## 2021-11-05 RX ORDER — ROPIVACAINE HYDROCHLORIDE 5 MG/ML
40 INJECTION, SOLUTION EPIDURAL; INFILTRATION; PERINEURAL
Status: CANCELLED | OUTPATIENT
Start: 2021-11-05 | End: 2021-11-05

## 2021-11-05 ASSESSMENT — PAIN DESCRIPTION - ORIENTATION: ORIENTATION: LEFT

## 2021-11-05 ASSESSMENT — ENCOUNTER SYMPTOMS
DYSPNEA ACTIVITY LEVEL: AFTER AMBULATING 1 FLIGHT OF STAIRS
SHORTNESS OF BREATH: 1

## 2021-11-05 ASSESSMENT — LIFESTYLE VARIABLES: SMOKING_STATUS: 1

## 2021-11-05 ASSESSMENT — PAIN DESCRIPTION - LOCATION: LOCATION: KNEE

## 2021-11-05 ASSESSMENT — PAIN DESCRIPTION - DESCRIPTORS: DESCRIPTORS: DULL;ACHING

## 2021-11-05 ASSESSMENT — PAIN SCALES - GENERAL: PAINLEVEL_OUTOF10: 7

## 2021-11-05 ASSESSMENT — COPD QUESTIONNAIRES: CAT_SEVERITY: MODERATE

## 2021-11-05 ASSESSMENT — PAIN DESCRIPTION - PAIN TYPE: TYPE: CHRONIC PAIN

## 2021-11-05 NOTE — ANESTHESIA PRE PROCEDURE
Department of Anesthesiology  Preprocedure Note       Name:  Kris Hanks   Age:  73 y.o.  :  1955                                          MRN:  [id number]         Date:  2021      Surgeon: Lonna Frankel): Judah Navarro DO    Procedure: LEFT TOTAL KNEE REPLACEMENT. Medications prior to admission:   Prior to Admission medications    Medication Sig Start Date End Date Taking? Authorizing Provider   Cholecalciferol (VITAMIN D3) 1.25 MG (37198 UT) CAPS take 1 capsule by mouth THREE TIMES A WEEK 21   Historical Provider, MD STERN VITAMIN B-12 100 MCG tablet take 1 tablet by mouth once daily  Patient not taking: Reported on 10/25/2021 8/7/21   Historical Provider, MD   ondansetron (ZOFRAN-ODT) 4 MG disintegrating tablet Take 1 tablet by mouth every 8 hours as needed for Nausea or Vomiting 20   Mark Mendez MD   budesonide-formoterol [facility]) 160-4.5 MCG/ACT AERO Inhale 2 puffs into the lungs 2 times daily    Historical Provider, MD   Multiple Vitamins-Minerals (THERAPEUTIC MULTIVITAMIN-MINERALS) tablet Take 1 tablet by mouth daily   Patient not taking: Reported on 10/25/2021    Historical Provider, MD   buPROPion (WELLBUTRIN XL) 150 MG extended release tablet Take 150 mg by mouth every morning    Historical Provider, MD   montelukast (SINGULAIR) 10 MG tablet nightly  20   Historical Provider, MD   ferrous sulfate 325 (65 Fe) MG tablet Take 1 tablet by mouth daily (with breakfast) Take with your multi vitamin daily.   Patient not taking: Reported on 10/25/2021 1/31/20   Mark Mendez MD   vitamin D (ERGOCALCIFEROL) 1.25 MG (94923 UT) CAPS capsule Take 1 capsule by mouth Twice a Week 20   Mark Mendez MD   lisinopril-hydrochlorothiazide (PRINZIDE;ZESTORETIC) 20-12.5 MG per tablet Take 1 tablet by mouth daily    Historical Provider, MD   DULoxetine (CYMBALTA) 60 MG extended release capsule Take 60 mg by mouth daily    Historical Provider, MD   cetirizine (ZYRTEC) 10 MG tablet Take 10 mg by mouth daily    Historical Provider, MD   fluticasone (FLONASE) 50 MCG/ACT nasal spray 1 spray by Nasal route daily 3/7/17   Sami Parker DO   meloxicam (MOBIC) 7.5 MG tablet Take 7.5 mg by mouth daily     Historical Provider, MD   oxybutynin (DITROPAN) 5 MG tablet Take 5 mg by mouth 3 times daily     Historical Provider, MD   ezetimibe (ZETIA) 10 MG tablet Take 10 mg by mouth daily    Historical Provider, MD   omeprazole (PRILOSEC) 20 MG capsule Take 20 mg by mouth daily. Historical Provider, MD   lovastatin (MEVACOR) 40 MG tablet Take 40 mg by mouth nightly. Historical Provider, MD   metoprolol (LOPRESSOR) 25 MG tablet Take 25 mg by mouth nightly. Historical Provider, MD   metFORMIN (GLUCOPHAGE) 500 MG tablet Take 500 mg by mouth daily (with breakfast)     Historical Provider, MD   levothyroxine (SYNTHROID) 50 MCG tablet Take 75 mcg by mouth Daily     Historical Provider, MD   gabapentin (NEURONTIN) 600 MG tablet Take 600 mg by mouth 3 times daily. Historical Provider, MD   sertraline (ZOLOFT) 100 MG tablet Take 100 mg by mouth nightly. Takes 1and 1/2 tablets nightly    Historical Provider, MD   albuterol (PROVENTIL HFA;VENTOLIN HFA) 108 (90 BASE) MCG/ACT inhaler Inhale 2 puffs into the lungs every 6 hours as needed. Historical Provider, MD   aspirin 81 MG tablet Take 81 mg by mouth daily.     Historical Provider, MD       Current medications:    Current Outpatient Medications   Medication Sig Dispense Refill    Cholecalciferol (VITAMIN D3) 1.25 MG (40757 UT) CAPS take 1 capsule by mouth THREE TIMES A WEEK      RA VITAMIN B-12 100 MCG tablet take 1 tablet by mouth once daily (Patient not taking: Reported on 10/25/2021)      ondansetron (ZOFRAN-ODT) 4 MG disintegrating tablet Take 1 tablet by mouth every 8 hours as needed for Nausea or Vomiting 20 tablet 1    budesonide-formoterol (SYMBICORT) 160-4.5 MCG/ACT AERO Inhale 2 puffs into the lungs 2 times daily      Multiple Vitamins-Minerals (THERAPEUTIC MULTIVITAMIN-MINERALS) tablet Take 1 tablet by mouth daily  (Patient not taking: Reported on 10/25/2021)      buPROPion (WELLBUTRIN XL) 150 MG extended release tablet Take 150 mg by mouth every morning      montelukast (SINGULAIR) 10 MG tablet nightly       ferrous sulfate 325 (65 Fe) MG tablet Take 1 tablet by mouth daily (with breakfast) Take with your multi vitamin daily. (Patient not taking: Reported on 10/25/2021) 30 tablet 1    vitamin D (ERGOCALCIFEROL) 1.25 MG (18564 UT) CAPS capsule Take 1 capsule by mouth Twice a Week 24 capsule 0    lisinopril-hydrochlorothiazide (PRINZIDE;ZESTORETIC) 20-12.5 MG per tablet Take 1 tablet by mouth daily      DULoxetine (CYMBALTA) 60 MG extended release capsule Take 60 mg by mouth daily      cetirizine (ZYRTEC) 10 MG tablet Take 10 mg by mouth daily      fluticasone (FLONASE) 50 MCG/ACT nasal spray 1 spray by Nasal route daily 1 Bottle 3    meloxicam (MOBIC) 7.5 MG tablet Take 7.5 mg by mouth daily       oxybutynin (DITROPAN) 5 MG tablet Take 5 mg by mouth 3 times daily       ezetimibe (ZETIA) 10 MG tablet Take 10 mg by mouth daily      omeprazole (PRILOSEC) 20 MG capsule Take 20 mg by mouth daily.  lovastatin (MEVACOR) 40 MG tablet Take 40 mg by mouth nightly.  metoprolol (LOPRESSOR) 25 MG tablet Take 25 mg by mouth nightly.  metFORMIN (GLUCOPHAGE) 500 MG tablet Take 500 mg by mouth daily (with breakfast)       levothyroxine (SYNTHROID) 50 MCG tablet Take 75 mcg by mouth Daily       gabapentin (NEURONTIN) 600 MG tablet Take 600 mg by mouth 3 times daily.  sertraline (ZOLOFT) 100 MG tablet Take 100 mg by mouth nightly. Takes 1and 1/2 tablets nightly      albuterol (PROVENTIL HFA;VENTOLIN HFA) 108 (90 BASE) MCG/ACT inhaler Inhale 2 puffs into the lungs every 6 hours as needed.  aspirin 81 MG tablet Take 81 mg by mouth daily. No current facility-administered medications for this visit. Allergies:     Allergies   Allergen Reactions    Food Other (See Comments)     Lactose Intolerance - Milk and Ice Cream Only    Milk-Related Compounds Other (See Comments)     Intolerance      Other      Enviromental      Codeine Nausea And Vomiting     itching       Problem List:    Patient Active Problem List   Diagnosis Code    Gastroesophageal reflux disease without esophagitis K21.9    Type 2 diabetes mellitus with stage 3 chronic kidney disease, without long-term current use of insulin ([facility]) E11.22, N18.30    Essential hypertension I10    Mixed hyperlipidemia E78.2    Chronic obstructive pulmonary disease ([address] J44.9    Moderate obesity E66.8    Exertional dyspnea R06.00    Primary osteoarthritis of left knee M17.12       Past Medical History:        Diagnosis Date    Arthritis     back, cervical neck    Asthma     Breast mass     fibercystic     Chronic sinusitis     Colon polyps     COPD (chronic obstructive pulmonary disease) ([address]     controlled with inhalers    Depression     Diabetes mellitus ([address]     Diabetic polyneuropathy ([address]     Diverticulitis     Diverticulosis     GERD (gastroesophageal reflux disease)     Hyperlipidemia     Hypertension     Mitral valve prolapse     f/u w/ PCP only - no symptoms    Neuropathy     Overactive thyroid gland     Shortness of breath     Sleep apnea     Thyroid disease        Past Surgical History:        Procedure Laterality Date    ABDOMINAL EXPLORATION SURGERY      APPENDECTOMY  1968    BRONCHOSCOPY  Oct 2013    Bronchoscopy and Mediastinoscopy    CARPAL TUNNEL RELEASE      bilat    CHOLECYSTECTOMY, LAPAROSCOPIC N/A 5/12/2020    CHOLECYSTECTOMY LAPAROSCOPIC WITH IOC performed by Wendi Fuentes MD at [facility] ARTHROSCOPY Left 8/4/2021    LEFT KNEE ARTHROSCOPY, MEDIAL MENISCECTOMY AND DEBRIDEMENT performed by Saleem Starks DO at [address] [place] [address] Drive  10/1995    left    TONSILLECTOMY 1972    UPPER GASTROINTESTINAL ENDOSCOPY N/A 2020    EGD BIOPSY performed by Liz Mora MD at [address] History:    Social History     Tobacco Use    Smoking status: Former Smoker     Packs/day: 4.00     Years: 45.00     Pack years: 180.00     Types: Cigarettes     Quit date: 2013     Years since quittin.1    Smokeless tobacco: Never Used    Tobacco comment: Uses E-cig   Substance Use Topics    Alcohol use: No     Comment: 8 cans pop daily                                Counseling given: Not Answered  Comment: Uses E-cig      Vital Signs (Current): There were no vitals filed for this visit. BP Readings from Last 3 Encounters:   21 (!) 109/54   21 (!) 142/64   21 129/78       NPO Status:                                                                                 BMI:   Wt Readings from Last 3 Encounters:   21 199 lb 3.2 oz (90.4 kg)   10/25/21 200 lb (90.7 kg)   21 200 lb (90.7 kg)     There is no height or weight on file to calculate BMI.    CBC:   Lab Results   Component Value Date    WBC 7.9 2021    RBC 4.21 2021    HGB 12.8 2021    HCT 38.3 2021    MCV 91.0 2021    RDW 12.2 2021     2021       CMP:   Lab Results   Component Value Date     2021    K 4.6 2021    CL 96 2021    CO2 25 2021    BUN 19 2021    CREATININE 1.2 2021    GFRAA 55 2021    LABGLOM 45 2021    GLUCOSE 128 2021    GLUCOSE 107 2012    PROT 7.2 2020    CALCIUM 9.6 2021    BILITOT 0.4 2020    ALKPHOS 88 2020    AST 14 2020    ALT 11 2020       POC Tests: No results for input(s): POCGLU, POCNA, POCK, POCCL, POCBUN, POCHEMO, POCHCT in the last 72 hours.     Coags:   Lab Results   Component Value Date    PROTIME 11.2 10/10/2013    INR 1.0 10/10/2013    APTT 28.0 10/10/2013       HCG (If Applicable): No results found for: PREGTESTUR, PREGSERUM, HCG, HCGQUANT     ABGs: No results found for: PHART, PO2ART, RYP4CTH, GNC5SJU, BEART, T5VLFSAS     Type & Screen (If Applicable):  No results found for: LABABO, LABRH    Drug/Infectious Status (If Applicable):  No results found for: HIV, HEPCAB    COVID-19 Screening (If Applicable):   Lab Results   Component Value Date    COVID19 Not Detected 05/08/2020       EKG 6/12/14  Sinus bradycardia   Otherwise normal      ECHO 3/13/20   Conclusions      Summary   Left ventricular internal dimensions were normal in diastole and systole. No regional wall motion abnormalities seen. Normal left ventricular ejection fraction. Mild mitral regurgitation is present. Structurally normal aortic valve. Physiologic and/or trace aortic regurgitation is noted. Mild tricuspid regurgitation. CXR 3/11/20  HISTORY: Shortness of breath       TECHNIQUE: PA and lateral  views of the chest were obtained.       FINDINGS:         SUPPORT DEVICES: None.       LUNGS: Clear with no areas of consolidation. Calcified granuloma in   the left lower lung zone is unchanged.       PLEURA: No pneumothorax visualized.       LUNG VOLUMES: Satisfactory inspirator effort.       MEDIASTINAL STRUCTURES: No lymphadenopathy. Normal aortic contour.        HEART SIZE: Normal.        UPPER ABDOMEN: Unremarkable.       BONES AND SOFT TISSUES: No fracture or soft tissue abnormality.            Impression   1. No acute findings.           Anesthesia Evaluation  Patient summary reviewed and Nursing notes reviewed no history of anesthetic complications (Pt denies ):   Airway: Mallampati: IV  TM distance: <3 FB   Neck ROM: limited  Mouth opening: > = 3 FB Dental:    (+) edentulous, upper dentures and lower dentures      Pulmonary:   (+) COPD: moderate,  shortness of breath: no interval change,  sleep apnea: on noncompliant,  decreased breath sounds,  asthma: seasonal asthma, current smoker (former smoker , she soked E Cigarette today AM.)          Patient did not smoke on day of surgery. Cardiovascular:  Exercise tolerance: poor (<4 METS),   (+) hypertension: no interval change, valvular problems/murmurs (Mild MR, mild TR): MR, ELIZABETH: after ambulating 1 flight of stairs, murmur, hyperlipidemia      ECG reviewed  Rhythm: regular  Rate: normal  Echocardiogram reviewed  Stress test reviewed       Beta Blocker:  Dose within 24 Hrs      ROS comment: Pt denies any palpitations. EKG: Normal Sinus Rhythm 66. ECHO 3/2020:  Summary   Left ventricular internal dimensions were normal in diastole and systole. No regional wall motion abnormalities seen. Normal left ventricular ejection fraction. Mild mitral regurgitation is present. Structurally normal aortic valve. Physiologic and/or trace aortic regurgitation is noted. Mild tricuspid regurgitation. Stress Test 2014: IMPRESSION:  The ejection fraction is greater than 75%.       Gated wall motion study:     Evaluation of left ventricular contractility following the stress  portion of the examination reveals no global or segmental  abnormality of kinesis. The left ventricle is not dilated.     IMPRESSION: No significant abnormality seen in left ventricular  contractility and wall motion evaluation.      EKG 7/2021:  Normal sinus rhythm rsr' in V1 OTHERWISE NORMAL EKG No previous ECGs available Confirmed by Asuncion Washburn ([id number]) on 7/29/2021 9:12:45 PM     Neuro/Psych:   (+) neuromuscular disease (Diabetic neuropathy):, psychiatric history (Depression):             ROS comment: Paresthesias of bilat forearms, hands, and feet per pt report   Diabetic Neuropathy GI/Hepatic/Renal:   (+) GERD: well controlled, renal disease (CKD stage III ): CRI,           Endo/Other:    (+) DiabetesType II DM, no interval change, , hypothyroidism: arthritis: OA., .                 Abdominal:   (+) obese,           Vascular:           ROS comment: Pt denies . Other Findings: Left AC 20 g            Anesthesia Plan      general and spinal     ASA 3     (Single shot left adductor Canal Nerve Block.)  Induction: intravenous. BIS  MIPS: Postoperative opioids intended and Prophylactic antiemetics administered. Anesthetic plan and risks discussed with patient. Use of blood products discussed with patient whom consented to blood products. Plan discussed with CRNA. Attending anesthesiologist reviewed and agrees with Preprocedure content              Gee Craig MD   11/5/2021      ------DOS anesthesiologist addendum------  Patient seen and evaluated. Plan for spinal anesthetic, in addition to  preoperative left saphenous peripheral nerve block, discussed with patient. All patient's questions were answered to her satisfaction. Patient consents to and agrees to spinal anesthetic, in addition to  preoperative left saphenous peripheral nerve block. Possible need for GETA, in event of unsuccessful spinal technique, was discussed with patient. Patient expresses understanding regarding this possibility. Patient consents to and agrees to GETA in event of unsuccessful spinal anesthetic.   Belinda Collado MD  11/10/21

## 2021-11-05 NOTE — PROGRESS NOTES
Patient attended preoperative Total Joint Camp on 11/5/2021. Patient is scheduled to have an elective knee replacement. Patient was educated regarding Disease Process, Medications, Smoking Cessation, Oxygenation, Incentive Spirometry and Deep Breath and Cough, signs and symptoms of postoperative joint infection that include: Fever, Chills, Pain Control, Drainage and Redness, post-op follow up with orthopaedic surgeon, dressing removal, staple removal, ambulatory devices which include a standard walker and cane, bed mobility, correct anatomical alignment, active range of motion, proper transferring technique, incision care, infection prevention measures, non-pharmacologic comfort measures, notification of inadequate pain control measures, pain scale for assessing level of pain, pharmacologic pain management, relaxation techniques.
Do not bring valuables (money, credit cards, checkbooks, etc.) Do not wear any makeup (including no eye makeup) or nail polish on your fingers or toes. 11. DO NOT wear any jewelry or piercings on day of surgery. All body piercing jewelry must be removed. 12. Shower the night before surgery with _x__Antibacterial soap /MIKA WIPES______x__    13. TOTAL JOINT REPLACEMENT/HYSTERECTOMY PATIENTS ONLY---Remember to bring Blood Bank bracelet to the hospital on the day of surgery. 14. If you have a Living Will and Durable Power of  for Healthcare, please bring in a copy. 15. If appropriate bring crutches, inspirex, WALKER, CANE etc... 12. Notify your Surgeon if you develop any illness between now and surgery time, cough, cold, fever, sore throat, nausea, vomiting, etc.  Please notify your surgeon if you experience dizziness, shortness of breath or blurred vision between now & the time of your surgery. 17. If you have ___dentures, they will be removed before going to the OR; we will provide you a container. If you wear ___contact lenses or __x_glasses, they will be removed; please bring a case for them. 18. To provide excellent care visitors will be limited to 1 in the room at any given time. 19. Please bring picture ID and insurance card. 20. Sleep apnea patients need to bring CPAP AND SETTINGS to hospital on day of surgery. 21. During flu season no children under the age of 15 are permitted in the hospital for the safety of all patients. 22. Other please check in at the information desk/main lobby. Please wear a mask. Please call AMBULATORY CARE if you have any further questions.    [address] [facility]     [address]

## 2021-11-07 LAB
MRSA CULTURE ONLY: NORMAL
URINE CULTURE, ROUTINE: NORMAL

## 2021-11-10 ENCOUNTER — APPOINTMENT (OUTPATIENT)
Dept: GENERAL RADIOLOGY | Age: 66
End: 2021-11-10
Attending: ORTHOPAEDIC SURGERY
Payer: COMMERCIAL

## 2021-11-10 ENCOUNTER — ANESTHESIA (OUTPATIENT)
Dept: OPERATING ROOM | Age: 66
End: 2021-11-10
Payer: COMMERCIAL

## 2021-11-10 ENCOUNTER — HOSPITAL ENCOUNTER (OUTPATIENT)
Age: 66
Setting detail: OBSERVATION
Discharge: HOME HEALTH | End: 2021-11-11
Attending: ORTHOPAEDIC SURGERY | Admitting: ORTHOPAEDIC SURGERY
Payer: COMMERCIAL

## 2021-11-10 VITALS
DIASTOLIC BLOOD PRESSURE: 76 MMHG | OXYGEN SATURATION: 98 % | SYSTOLIC BLOOD PRESSURE: 128 MMHG | RESPIRATION RATE: 23 BRPM | TEMPERATURE: 97.7 F

## 2021-11-10 DIAGNOSIS — Z96.652 STATUS POST TOTAL LEFT KNEE REPLACEMENT: ICD-10-CM

## 2021-11-10 DIAGNOSIS — M17.12 PRIMARY OSTEOARTHRITIS OF LEFT KNEE: Primary | ICD-10-CM

## 2021-11-10 LAB
HCT VFR BLD CALC: 34.2 % (ref 34–48)
HEMOGLOBIN: 11.3 G/DL (ref 11.5–15.5)
METER GLUCOSE: 109 MG/DL (ref 74–99)
METER GLUCOSE: 141 MG/DL (ref 74–99)
METER GLUCOSE: 158 MG/DL (ref 74–99)

## 2021-11-10 PROCEDURE — 3600000005 HC SURGERY LEVEL 5 BASE: Performed by: ORTHOPAEDIC SURGERY

## 2021-11-10 PROCEDURE — 94640 AIRWAY INHALATION TREATMENT: CPT

## 2021-11-10 PROCEDURE — 6360000002 HC RX W HCPCS: Performed by: NURSE PRACTITIONER

## 2021-11-10 PROCEDURE — C1776 JOINT DEVICE (IMPLANTABLE): HCPCS | Performed by: ORTHOPAEDIC SURGERY

## 2021-11-10 PROCEDURE — 6360000002 HC RX W HCPCS: Performed by: ANESTHESIOLOGY

## 2021-11-10 PROCEDURE — 85018 HEMOGLOBIN: CPT

## 2021-11-10 PROCEDURE — 6360000002 HC RX W HCPCS: Performed by: NURSE ANESTHETIST, CERTIFIED REGISTERED

## 2021-11-10 PROCEDURE — 2580000003 HC RX 258: Performed by: ORTHOPAEDIC SURGERY

## 2021-11-10 PROCEDURE — 82962 GLUCOSE BLOOD TEST: CPT

## 2021-11-10 PROCEDURE — 6370000000 HC RX 637 (ALT 250 FOR IP): Performed by: NURSE PRACTITIONER

## 2021-11-10 PROCEDURE — 2500000003 HC RX 250 WO HCPCS: Performed by: NURSE ANESTHETIST, CERTIFIED REGISTERED

## 2021-11-10 PROCEDURE — 2700000000 HC OXYGEN THERAPY PER DAY

## 2021-11-10 PROCEDURE — 2500000003 HC RX 250 WO HCPCS: Performed by: NURSE PRACTITIONER

## 2021-11-10 PROCEDURE — 97530 THERAPEUTIC ACTIVITIES: CPT | Performed by: PHYSICAL THERAPIST

## 2021-11-10 PROCEDURE — 3700000000 HC ANESTHESIA ATTENDED CARE: Performed by: ORTHOPAEDIC SURGERY

## 2021-11-10 PROCEDURE — G0378 HOSPITAL OBSERVATION PER HR: HCPCS

## 2021-11-10 PROCEDURE — 6370000000 HC RX 637 (ALT 250 FOR IP): Performed by: ORTHOPAEDIC SURGERY

## 2021-11-10 PROCEDURE — 6360000002 HC RX W HCPCS: Performed by: ORTHOPAEDIC SURGERY

## 2021-11-10 PROCEDURE — 2580000003 HC RX 258: Performed by: NURSE ANESTHETIST, CERTIFIED REGISTERED

## 2021-11-10 PROCEDURE — C1713 ANCHOR/SCREW BN/BN,TIS/BN: HCPCS | Performed by: ORTHOPAEDIC SURGERY

## 2021-11-10 PROCEDURE — 73560 X-RAY EXAM OF KNEE 1 OR 2: CPT

## 2021-11-10 PROCEDURE — 85014 HEMATOCRIT: CPT

## 2021-11-10 PROCEDURE — 7100000000 HC PACU RECOVERY - FIRST 15 MIN: Performed by: ORTHOPAEDIC SURGERY

## 2021-11-10 PROCEDURE — 88311 DECALCIFY TISSUE: CPT

## 2021-11-10 PROCEDURE — 7100000001 HC PACU RECOVERY - ADDTL 15 MIN: Performed by: ORTHOPAEDIC SURGERY

## 2021-11-10 PROCEDURE — 88305 TISSUE EXAM BY PATHOLOGIST: CPT

## 2021-11-10 PROCEDURE — 6360000002 HC RX W HCPCS

## 2021-11-10 PROCEDURE — 3600000015 HC SURGERY LEVEL 5 ADDTL 15MIN: Performed by: ORTHOPAEDIC SURGERY

## 2021-11-10 PROCEDURE — 2500000003 HC RX 250 WO HCPCS: Performed by: ORTHOPAEDIC SURGERY

## 2021-11-10 PROCEDURE — 2709999900 HC NON-CHARGEABLE SUPPLY: Performed by: ORTHOPAEDIC SURGERY

## 2021-11-10 PROCEDURE — 3700000001 HC ADD 15 MINUTES (ANESTHESIA): Performed by: ORTHOPAEDIC SURGERY

## 2021-11-10 PROCEDURE — 36415 COLL VENOUS BLD VENIPUNCTURE: CPT

## 2021-11-10 PROCEDURE — 97161 PT EVAL LOW COMPLEX 20 MIN: CPT | Performed by: PHYSICAL THERAPIST

## 2021-11-10 PROCEDURE — 64447 NJX AA&/STRD FEMORAL NRV IMG: CPT | Performed by: ANESTHESIOLOGY

## 2021-11-10 PROCEDURE — 2580000003 HC RX 258: Performed by: NURSE PRACTITIONER

## 2021-11-10 PROCEDURE — 2580000003 HC RX 258: Performed by: ANESTHESIOLOGY

## 2021-11-10 PROCEDURE — 27447 TOTAL KNEE ARTHROPLASTY: CPT | Performed by: ORTHOPAEDIC SURGERY

## 2021-11-10 DEVICE — LEGION PS OXINIUM FEMORAL SZ 4 LEFT
Type: IMPLANTABLE DEVICE | Site: KNEE | Status: FUNCTIONAL
Brand: LEGION

## 2021-11-10 DEVICE — GENESIS II NON-POROUS TIBIAL                                    BASEPLATE SIZE 4 LEFT
Type: IMPLANTABLE DEVICE | Site: KNEE | Status: FUNCTIONAL
Brand: GENESIS II

## 2021-11-10 DEVICE — GEN II 7.5MM RESUR PAT 26MM
Type: IMPLANTABLE DEVICE | Site: KNEE | Status: FUNCTIONAL
Brand: GENESIS II

## 2021-11-10 DEVICE — LEGION PS HIGH FLEX XLPE SZ 3-4 12MM
Type: IMPLANTABLE DEVICE | Site: KNEE | Status: FUNCTIONAL
Brand: LEGION

## 2021-11-10 DEVICE — CEMENT BNE 40 GM RADIOPAQUE BA SIMPLEX P: Type: IMPLANTABLE DEVICE | Site: KNEE | Status: FUNCTIONAL

## 2021-11-10 DEVICE — KNEE K1 TOT HEMI STD CEM IMPL CAPPED K1 SN: Type: IMPLANTABLE DEVICE | Status: FUNCTIONAL

## 2021-11-10 RX ORDER — ACETAMINOPHEN 325 MG/1
650 TABLET ORAL EVERY 6 HOURS
Status: DISCONTINUED | OUTPATIENT
Start: 2021-11-10 | End: 2021-11-11 | Stop reason: HOSPADM

## 2021-11-10 RX ORDER — ALBUTEROL SULFATE 90 UG/1
2 AEROSOL, METERED RESPIRATORY (INHALATION) EVERY 6 HOURS PRN
Status: DISCONTINUED | OUTPATIENT
Start: 2021-11-10 | End: 2021-11-10 | Stop reason: CLARIF

## 2021-11-10 RX ORDER — MIDAZOLAM HYDROCHLORIDE 1 MG/ML
INJECTION INTRAMUSCULAR; INTRAVENOUS PRN
Status: DISCONTINUED | OUTPATIENT
Start: 2021-11-10 | End: 2021-11-10 | Stop reason: SDUPTHER

## 2021-11-10 RX ORDER — HYDROCHLOROTHIAZIDE 12.5 MG/1
12.5 TABLET ORAL DAILY
Status: DISCONTINUED | OUTPATIENT
Start: 2021-11-11 | End: 2021-11-11 | Stop reason: HOSPADM

## 2021-11-10 RX ORDER — LEVOTHYROXINE SODIUM 0.05 MG/1
50 TABLET ORAL DAILY
Status: DISCONTINUED | OUTPATIENT
Start: 2021-11-11 | End: 2021-11-11 | Stop reason: HOSPADM

## 2021-11-10 RX ORDER — MEPERIDINE HYDROCHLORIDE 25 MG/ML
12.5 INJECTION INTRAMUSCULAR; INTRAVENOUS; SUBCUTANEOUS EVERY 5 MIN PRN
Status: DISCONTINUED | OUTPATIENT
Start: 2021-11-10 | End: 2021-11-10 | Stop reason: HOSPADM

## 2021-11-10 RX ORDER — LIDOCAINE HYDROCHLORIDE 20 MG/ML
INJECTION, SOLUTION INTRAVENOUS PRN
Status: DISCONTINUED | OUTPATIENT
Start: 2021-11-10 | End: 2021-11-10 | Stop reason: SDUPTHER

## 2021-11-10 RX ORDER — LISINOPRIL AND HYDROCHLOROTHIAZIDE 20; 12.5 MG/1; MG/1
1 TABLET ORAL DAILY
Status: DISCONTINUED | OUTPATIENT
Start: 2021-11-10 | End: 2021-11-10 | Stop reason: CLARIF

## 2021-11-10 RX ORDER — EZETIMIBE 10 MG/1
10 TABLET ORAL DAILY
Status: DISCONTINUED | OUTPATIENT
Start: 2021-11-10 | End: 2021-11-11 | Stop reason: HOSPADM

## 2021-11-10 RX ORDER — SODIUM CHLORIDE 9 MG/ML
INJECTION, SOLUTION INTRAVENOUS CONTINUOUS
Status: DISCONTINUED | OUTPATIENT
Start: 2021-11-10 | End: 2021-11-10

## 2021-11-10 RX ORDER — SODIUM CHLORIDE 9 MG/ML
INJECTION, SOLUTION INTRAVENOUS CONTINUOUS
Status: DISCONTINUED | OUTPATIENT
Start: 2021-11-10 | End: 2021-11-11 | Stop reason: HOSPADM

## 2021-11-10 RX ORDER — OXYCODONE HYDROCHLORIDE 5 MG/1
10 TABLET ORAL EVERY 4 HOURS PRN
Status: DISCONTINUED | OUTPATIENT
Start: 2021-11-10 | End: 2021-11-11 | Stop reason: HOSPADM

## 2021-11-10 RX ORDER — VANCOMYCIN HYDROCHLORIDE 1 G/20ML
INJECTION, POWDER, LYOPHILIZED, FOR SOLUTION INTRAVENOUS PRN
Status: DISCONTINUED | OUTPATIENT
Start: 2021-11-10 | End: 2021-11-10 | Stop reason: ALTCHOICE

## 2021-11-10 RX ORDER — MELOXICAM 7.5 MG/1
3.75 TABLET ORAL DAILY
Status: DISCONTINUED | OUTPATIENT
Start: 2021-11-10 | End: 2021-11-11 | Stop reason: HOSPADM

## 2021-11-10 RX ORDER — FERROUS SULFATE 325(65) MG
325 TABLET ORAL
Status: DISCONTINUED | OUTPATIENT
Start: 2021-11-11 | End: 2021-11-11 | Stop reason: HOSPADM

## 2021-11-10 RX ORDER — FLUTICASONE PROPIONATE 50 MCG
1 SPRAY, SUSPENSION (ML) NASAL DAILY
Status: DISCONTINUED | OUTPATIENT
Start: 2021-11-10 | End: 2021-11-11 | Stop reason: HOSPADM

## 2021-11-10 RX ORDER — DIPHENHYDRAMINE HYDROCHLORIDE 50 MG/ML
INJECTION INTRAMUSCULAR; INTRAVENOUS PRN
Status: DISCONTINUED | OUTPATIENT
Start: 2021-11-10 | End: 2021-11-10 | Stop reason: SDUPTHER

## 2021-11-10 RX ORDER — FENTANYL CITRATE 50 UG/ML
100 INJECTION, SOLUTION INTRAMUSCULAR; INTRAVENOUS ONCE
Status: COMPLETED | OUTPATIENT
Start: 2021-11-10 | End: 2021-11-10

## 2021-11-10 RX ORDER — PROMETHAZINE HYDROCHLORIDE 25 MG/ML
6.25 INJECTION, SOLUTION INTRAMUSCULAR; INTRAVENOUS
Status: DISCONTINUED | OUTPATIENT
Start: 2021-11-10 | End: 2021-11-10 | Stop reason: HOSPADM

## 2021-11-10 RX ORDER — FENTANYL CITRATE 50 UG/ML
50 INJECTION, SOLUTION INTRAMUSCULAR; INTRAVENOUS EVERY 10 MIN PRN
Status: DISCONTINUED | OUTPATIENT
Start: 2021-11-10 | End: 2021-11-10 | Stop reason: HOSPADM

## 2021-11-10 RX ORDER — OXYCODONE HYDROCHLORIDE AND ACETAMINOPHEN 5; 325 MG/1; MG/1
1 TABLET ORAL
Status: DISCONTINUED | OUTPATIENT
Start: 2021-11-10 | End: 2021-11-10 | Stop reason: HOSPADM

## 2021-11-10 RX ORDER — FENTANYL CITRATE 50 UG/ML
INJECTION, SOLUTION INTRAMUSCULAR; INTRAVENOUS
Status: COMPLETED
Start: 2021-11-10 | End: 2021-11-10

## 2021-11-10 RX ORDER — ONDANSETRON 4 MG/1
4 TABLET, ORALLY DISINTEGRATING ORAL EVERY 8 HOURS PRN
Status: DISCONTINUED | OUTPATIENT
Start: 2021-11-10 | End: 2021-11-11 | Stop reason: HOSPADM

## 2021-11-10 RX ORDER — OXYBUTYNIN CHLORIDE 5 MG/1
5 TABLET ORAL 3 TIMES DAILY
Status: DISCONTINUED | OUTPATIENT
Start: 2021-11-10 | End: 2021-11-11 | Stop reason: HOSPADM

## 2021-11-10 RX ORDER — ROPIVACAINE HYDROCHLORIDE 5 MG/ML
INJECTION, SOLUTION EPIDURAL; INFILTRATION; PERINEURAL
Status: COMPLETED | OUTPATIENT
Start: 2021-11-10 | End: 2021-11-10

## 2021-11-10 RX ORDER — GABAPENTIN 300 MG/1
600 CAPSULE ORAL 3 TIMES DAILY
Status: DISCONTINUED | OUTPATIENT
Start: 2021-11-10 | End: 2021-11-11 | Stop reason: HOSPADM

## 2021-11-10 RX ORDER — ROPIVACAINE HYDROCHLORIDE 5 MG/ML
INJECTION, SOLUTION EPIDURAL; INFILTRATION; PERINEURAL
Status: COMPLETED
Start: 2021-11-10 | End: 2021-11-10

## 2021-11-10 RX ORDER — MONTELUKAST SODIUM 10 MG/1
10 TABLET ORAL NIGHTLY
Status: DISCONTINUED | OUTPATIENT
Start: 2021-11-10 | End: 2021-11-11 | Stop reason: HOSPADM

## 2021-11-10 RX ORDER — LABETALOL HYDROCHLORIDE 5 MG/ML
5 INJECTION, SOLUTION INTRAVENOUS EVERY 10 MIN PRN
Status: DISCONTINUED | OUTPATIENT
Start: 2021-11-10 | End: 2021-11-10 | Stop reason: HOSPADM

## 2021-11-10 RX ORDER — HYDRALAZINE HYDROCHLORIDE 20 MG/ML
5 INJECTION INTRAMUSCULAR; INTRAVENOUS EVERY 10 MIN PRN
Status: DISCONTINUED | OUTPATIENT
Start: 2021-11-10 | End: 2021-11-10 | Stop reason: HOSPADM

## 2021-11-10 RX ORDER — SODIUM CHLORIDE 0.9 % (FLUSH) 0.9 %
5-40 SYRINGE (ML) INJECTION PRN
Status: DISCONTINUED | OUTPATIENT
Start: 2021-11-10 | End: 2021-11-11 | Stop reason: HOSPADM

## 2021-11-10 RX ORDER — ACETAMINOPHEN 500 MG
1000 TABLET ORAL ONCE
Status: COMPLETED | OUTPATIENT
Start: 2021-11-10 | End: 2021-11-10

## 2021-11-10 RX ORDER — PROPOFOL 10 MG/ML
INJECTION, EMULSION INTRAVENOUS CONTINUOUS PRN
Status: DISCONTINUED | OUTPATIENT
Start: 2021-11-10 | End: 2021-11-10 | Stop reason: SDUPTHER

## 2021-11-10 RX ORDER — GLYCOPYRROLATE 1 MG/5 ML
SYRINGE (ML) INTRAVENOUS PRN
Status: DISCONTINUED | OUTPATIENT
Start: 2021-11-10 | End: 2021-11-10 | Stop reason: SDUPTHER

## 2021-11-10 RX ORDER — ROPIVACAINE HYDROCHLORIDE 5 MG/ML
40 INJECTION, SOLUTION EPIDURAL; INFILTRATION; PERINEURAL
Status: DISCONTINUED | OUTPATIENT
Start: 2021-11-10 | End: 2021-11-10 | Stop reason: HOSPADM

## 2021-11-10 RX ORDER — LISINOPRIL 20 MG/1
20 TABLET ORAL DAILY
Status: DISCONTINUED | OUTPATIENT
Start: 2021-11-11 | End: 2021-11-11 | Stop reason: HOSPADM

## 2021-11-10 RX ORDER — SODIUM CHLORIDE 0.9 % (FLUSH) 0.9 %
5-40 SYRINGE (ML) INJECTION EVERY 12 HOURS SCHEDULED
Status: DISCONTINUED | OUTPATIENT
Start: 2021-11-10 | End: 2021-11-10 | Stop reason: HOSPADM

## 2021-11-10 RX ORDER — SODIUM CHLORIDE 0.9 % (FLUSH) 0.9 %
5-40 SYRINGE (ML) INJECTION PRN
Status: DISCONTINUED | OUTPATIENT
Start: 2021-11-10 | End: 2021-11-10 | Stop reason: HOSPADM

## 2021-11-10 RX ORDER — ALBUTEROL SULFATE 2.5 MG/3ML
2.5 SOLUTION RESPIRATORY (INHALATION) EVERY 6 HOURS PRN
Status: DISCONTINUED | OUTPATIENT
Start: 2021-11-10 | End: 2021-11-11 | Stop reason: HOSPADM

## 2021-11-10 RX ORDER — ARFORMOTEROL TARTRATE 15 UG/2ML
15 SOLUTION RESPIRATORY (INHALATION) 2 TIMES DAILY
Status: DISCONTINUED | OUTPATIENT
Start: 2021-11-10 | End: 2021-11-11 | Stop reason: HOSPADM

## 2021-11-10 RX ORDER — SODIUM CHLORIDE 9 MG/ML
25 INJECTION, SOLUTION INTRAVENOUS PRN
Status: DISCONTINUED | OUTPATIENT
Start: 2021-11-10 | End: 2021-11-10

## 2021-11-10 RX ORDER — OXYCODONE HYDROCHLORIDE AND ACETAMINOPHEN 5; 325 MG/1; MG/1
1 TABLET ORAL EVERY 6 HOURS PRN
Qty: 28 TABLET | Refills: 0 | Status: SHIPPED | OUTPATIENT
Start: 2021-11-10 | End: 2021-11-23 | Stop reason: SDUPTHER

## 2021-11-10 RX ORDER — MIDAZOLAM HYDROCHLORIDE 1 MG/ML
1 INJECTION INTRAMUSCULAR; INTRAVENOUS PRN
Status: COMPLETED | OUTPATIENT
Start: 2021-11-10 | End: 2021-11-10

## 2021-11-10 RX ORDER — DEXTROSE AND SODIUM CHLORIDE 5; .45 G/100ML; G/100ML
INJECTION, SOLUTION INTRAVENOUS CONTINUOUS
Status: DISCONTINUED | OUTPATIENT
Start: 2021-11-10 | End: 2021-11-10

## 2021-11-10 RX ORDER — DULOXETIN HYDROCHLORIDE 60 MG/1
60 CAPSULE, DELAYED RELEASE ORAL DAILY
Status: DISCONTINUED | OUTPATIENT
Start: 2021-11-11 | End: 2021-11-11 | Stop reason: HOSPADM

## 2021-11-10 RX ORDER — BUPROPION HYDROCHLORIDE 150 MG/1
150 TABLET ORAL EVERY MORNING
Status: DISCONTINUED | OUTPATIENT
Start: 2021-11-11 | End: 2021-11-11 | Stop reason: HOSPADM

## 2021-11-10 RX ORDER — SODIUM CHLORIDE 0.9 % (FLUSH) 0.9 %
5-40 SYRINGE (ML) INJECTION EVERY 12 HOURS SCHEDULED
Status: DISCONTINUED | OUTPATIENT
Start: 2021-11-10 | End: 2021-11-11 | Stop reason: HOSPADM

## 2021-11-10 RX ORDER — BUDESONIDE AND FORMOTEROL FUMARATE DIHYDRATE 160; 4.5 UG/1; UG/1
2 AEROSOL RESPIRATORY (INHALATION) 2 TIMES DAILY
Status: DISCONTINUED | OUTPATIENT
Start: 2021-11-10 | End: 2021-11-10 | Stop reason: CLARIF

## 2021-11-10 RX ORDER — ONDANSETRON 2 MG/ML
4 INJECTION INTRAMUSCULAR; INTRAVENOUS EVERY 6 HOURS PRN
Status: DISCONTINUED | OUTPATIENT
Start: 2021-11-10 | End: 2021-11-11 | Stop reason: HOSPADM

## 2021-11-10 RX ORDER — ONDANSETRON 2 MG/ML
INJECTION INTRAMUSCULAR; INTRAVENOUS PRN
Status: DISCONTINUED | OUTPATIENT
Start: 2021-11-10 | End: 2021-11-10 | Stop reason: SDUPTHER

## 2021-11-10 RX ORDER — CELECOXIB 100 MG/1
100 CAPSULE ORAL ONCE
Status: COMPLETED | OUTPATIENT
Start: 2021-11-10 | End: 2021-11-10

## 2021-11-10 RX ORDER — DEXAMETHASONE SODIUM PHOSPHATE 10 MG/ML
8 INJECTION INTRAMUSCULAR; INTRAVENOUS ONCE
Status: COMPLETED | OUTPATIENT
Start: 2021-11-10 | End: 2021-11-10

## 2021-11-10 RX ORDER — PANTOPRAZOLE SODIUM 40 MG/1
40 TABLET, DELAYED RELEASE ORAL
Status: DISCONTINUED | OUTPATIENT
Start: 2021-11-11 | End: 2021-11-11 | Stop reason: HOSPADM

## 2021-11-10 RX ORDER — BUDESONIDE 0.5 MG/2ML
0.5 INHALANT ORAL 2 TIMES DAILY
Status: DISCONTINUED | OUTPATIENT
Start: 2021-11-10 | End: 2021-11-11 | Stop reason: HOSPADM

## 2021-11-10 RX ORDER — CETIRIZINE HYDROCHLORIDE 10 MG/1
10 TABLET ORAL DAILY
Status: DISCONTINUED | OUTPATIENT
Start: 2021-11-10 | End: 2021-11-11 | Stop reason: HOSPADM

## 2021-11-10 RX ORDER — SODIUM CHLORIDE 9 MG/ML
25 INJECTION, SOLUTION INTRAVENOUS PRN
Status: DISCONTINUED | OUTPATIENT
Start: 2021-11-10 | End: 2021-11-11 | Stop reason: HOSPADM

## 2021-11-10 RX ORDER — SODIUM CHLORIDE 9 MG/ML
INJECTION, SOLUTION INTRAVENOUS CONTINUOUS PRN
Status: DISCONTINUED | OUTPATIENT
Start: 2021-11-10 | End: 2021-11-10 | Stop reason: SDUPTHER

## 2021-11-10 RX ORDER — FENTANYL CITRATE 50 UG/ML
INJECTION, SOLUTION INTRAMUSCULAR; INTRAVENOUS PRN
Status: DISCONTINUED | OUTPATIENT
Start: 2021-11-10 | End: 2021-11-10 | Stop reason: SDUPTHER

## 2021-11-10 RX ORDER — MIDAZOLAM HYDROCHLORIDE 1 MG/ML
INJECTION INTRAMUSCULAR; INTRAVENOUS
Status: COMPLETED
Start: 2021-11-10 | End: 2021-11-10

## 2021-11-10 RX ORDER — BUPIVACAINE HYDROCHLORIDE 7.5 MG/ML
INJECTION, SOLUTION INTRASPINAL PRN
Status: DISCONTINUED | OUTPATIENT
Start: 2021-11-10 | End: 2021-11-10 | Stop reason: SDUPTHER

## 2021-11-10 RX ORDER — MORPHINE SULFATE 2 MG/ML
2 INJECTION, SOLUTION INTRAMUSCULAR; INTRAVENOUS
Status: DISCONTINUED | OUTPATIENT
Start: 2021-11-10 | End: 2021-11-11 | Stop reason: HOSPADM

## 2021-11-10 RX ORDER — ATORVASTATIN CALCIUM 10 MG/1
10 TABLET, FILM COATED ORAL NIGHTLY
Status: DISCONTINUED | OUTPATIENT
Start: 2021-11-10 | End: 2021-11-11 | Stop reason: HOSPADM

## 2021-11-10 RX ADMIN — PHENYLEPHRINE HYDROCHLORIDE 50 MCG: 10 INJECTION INTRAVENOUS at 10:37

## 2021-11-10 RX ADMIN — GABAPENTIN 600 MG: 300 CAPSULE ORAL at 15:00

## 2021-11-10 RX ADMIN — Medication 2000 MG: at 10:22

## 2021-11-10 RX ADMIN — SODIUM CHLORIDE: 9 INJECTION, SOLUTION INTRAVENOUS at 09:17

## 2021-11-10 RX ADMIN — SODIUM CHLORIDE: 9 INJECTION, SOLUTION INTRAVENOUS at 17:48

## 2021-11-10 RX ADMIN — ATORVASTATIN CALCIUM 10 MG: 10 TABLET, FILM COATED ORAL at 20:05

## 2021-11-10 RX ADMIN — DIPHENHYDRAMINE HYDROCHLORIDE 25 MG: 50 INJECTION, SOLUTION INTRAMUSCULAR; INTRAVENOUS at 10:10

## 2021-11-10 RX ADMIN — PHENYLEPHRINE HYDROCHLORIDE 100 MCG: 10 INJECTION INTRAVENOUS at 11:19

## 2021-11-10 RX ADMIN — SERTRALINE HYDROCHLORIDE 100 MG: 50 TABLET ORAL at 20:08

## 2021-11-10 RX ADMIN — BUDESONIDE 500 MCG: 0.5 INHALANT RESPIRATORY (INHALATION) at 19:47

## 2021-11-10 RX ADMIN — SODIUM CHLORIDE: 9 INJECTION, SOLUTION INTRAVENOUS at 10:07

## 2021-11-10 RX ADMIN — MORPHINE SULFATE 2 MG: 2 INJECTION, SOLUTION INTRAMUSCULAR; INTRAVENOUS at 18:42

## 2021-11-10 RX ADMIN — MIDAZOLAM 1 MG: 1 INJECTION INTRAMUSCULAR; INTRAVENOUS at 09:55

## 2021-11-10 RX ADMIN — Medication 0.2 MG: at 12:10

## 2021-11-10 RX ADMIN — DIPHENHYDRAMINE HYDROCHLORIDE 25 MG: 50 INJECTION, SOLUTION INTRAMUSCULAR; INTRAVENOUS at 10:32

## 2021-11-10 RX ADMIN — CELECOXIB 100 MG: 100 CAPSULE ORAL at 09:18

## 2021-11-10 RX ADMIN — MELOXICAM 3.75 MG: 7.5 TABLET ORAL at 17:03

## 2021-11-10 RX ADMIN — ALBUTEROL SULFATE 2.5 MG: 2.5 SOLUTION RESPIRATORY (INHALATION) at 19:47

## 2021-11-10 RX ADMIN — MIDAZOLAM 1 MG: 1 INJECTION INTRAMUSCULAR; INTRAVENOUS at 10:10

## 2021-11-10 RX ADMIN — FENTANYL CITRATE 50 MCG: 50 INJECTION, SOLUTION INTRAMUSCULAR; INTRAVENOUS at 11:51

## 2021-11-10 RX ADMIN — CEFAZOLIN 2000 MG: 10 INJECTION, POWDER, FOR SOLUTION INTRAVENOUS at 21:58

## 2021-11-10 RX ADMIN — PHENYLEPHRINE HYDROCHLORIDE 100 MCG: 10 INJECTION INTRAVENOUS at 10:56

## 2021-11-10 RX ADMIN — MORPHINE SULFATE 2 MG: 2 INJECTION, SOLUTION INTRAMUSCULAR; INTRAVENOUS at 21:58

## 2021-11-10 RX ADMIN — MIDAZOLAM 1 MG: 1 INJECTION INTRAMUSCULAR; INTRAVENOUS at 10:32

## 2021-11-10 RX ADMIN — PHENYLEPHRINE HYDROCHLORIDE 100 MCG: 10 INJECTION INTRAVENOUS at 11:06

## 2021-11-10 RX ADMIN — Medication 0.1 MG: at 11:06

## 2021-11-10 RX ADMIN — ACETAMINOPHEN 1000 MG: 500 TABLET ORAL at 09:18

## 2021-11-10 RX ADMIN — FENTANYL CITRATE 25 MCG: 50 INJECTION, SOLUTION INTRAMUSCULAR; INTRAVENOUS at 10:41

## 2021-11-10 RX ADMIN — ONDANSETRON 4 MG: 2 INJECTION INTRAMUSCULAR; INTRAVENOUS at 12:04

## 2021-11-10 RX ADMIN — SODIUM CHLORIDE: 9 INJECTION, SOLUTION INTRAVENOUS at 13:02

## 2021-11-10 RX ADMIN — Medication 0.1 MG: at 10:44

## 2021-11-10 RX ADMIN — METFORMIN HYDROCHLORIDE 500 MG: 500 TABLET ORAL at 17:02

## 2021-11-10 RX ADMIN — Medication 2000 MG: at 13:37

## 2021-11-10 RX ADMIN — PHENYLEPHRINE HYDROCHLORIDE 50 MCG: 10 INJECTION INTRAVENOUS at 11:52

## 2021-11-10 RX ADMIN — OXYBUTYNIN CHLORIDE 5 MG: 5 TABLET ORAL at 15:00

## 2021-11-10 RX ADMIN — ARFORMOTEROL TARTRATE 15 MCG: 15 SOLUTION RESPIRATORY (INHALATION) at 19:47

## 2021-11-10 RX ADMIN — ONDANSETRON 4 MG: 2 INJECTION INTRAMUSCULAR; INTRAVENOUS at 17:58

## 2021-11-10 RX ADMIN — PHENYLEPHRINE HYDROCHLORIDE 100 MCG: 10 INJECTION INTRAVENOUS at 10:31

## 2021-11-10 RX ADMIN — ACETAMINOPHEN 650 MG: 325 TABLET ORAL at 17:50

## 2021-11-10 RX ADMIN — MONTELUKAST 10 MG: 10 TABLET, FILM COATED ORAL at 20:05

## 2021-11-10 RX ADMIN — BUPIVACAINE HYDROCHLORIDE IN DEXTROSE 2 ML: 7.5 INJECTION, SOLUTION SUBARACHNOID at 10:17

## 2021-11-10 RX ADMIN — PHENYLEPHRINE HYDROCHLORIDE 100 MCG: 10 INJECTION INTRAVENOUS at 11:33

## 2021-11-10 RX ADMIN — PHENYLEPHRINE HYDROCHLORIDE 50 MCG: 10 INJECTION INTRAVENOUS at 12:02

## 2021-11-10 RX ADMIN — OXYBUTYNIN CHLORIDE 5 MG: 5 TABLET ORAL at 20:05

## 2021-11-10 RX ADMIN — PHENYLEPHRINE HYDROCHLORIDE 100 MCG: 10 INJECTION INTRAVENOUS at 12:10

## 2021-11-10 RX ADMIN — OXYCODONE 10 MG: 5 TABLET ORAL at 16:07

## 2021-11-10 RX ADMIN — MIDAZOLAM 1 MG: 1 INJECTION INTRAMUSCULAR; INTRAVENOUS at 10:00

## 2021-11-10 RX ADMIN — DEXAMETHASONE SODIUM PHOSPHATE 8 MG: 10 INJECTION INTRAMUSCULAR; INTRAVENOUS at 09:18

## 2021-11-10 RX ADMIN — LIDOCAINE HYDROCHLORIDE 20 MG: 20 INJECTION, SOLUTION INTRAVENOUS at 10:33

## 2021-11-10 RX ADMIN — Medication 0.1 MG: at 10:31

## 2021-11-10 RX ADMIN — PROPOFOL 25 MCG/KG/MIN: 10 INJECTION, EMULSION INTRAVENOUS at 10:33

## 2021-11-10 RX ADMIN — PHENYLEPHRINE HYDROCHLORIDE 200 MCG: 10 INJECTION INTRAVENOUS at 12:18

## 2021-11-10 RX ADMIN — PHENYLEPHRINE HYDROCHLORIDE 200 MCG: 10 INJECTION INTRAVENOUS at 10:44

## 2021-11-10 RX ADMIN — METOPROLOL TARTRATE 25 MG: 25 TABLET, FILM COATED ORAL at 20:05

## 2021-11-10 RX ADMIN — FENTANYL CITRATE 25 MCG: 50 INJECTION, SOLUTION INTRAMUSCULAR; INTRAVENOUS at 10:51

## 2021-11-10 RX ADMIN — PHENYLEPHRINE HYDROCHLORIDE 50 MCG: 10 INJECTION INTRAVENOUS at 10:24

## 2021-11-10 RX ADMIN — ROPIVACAINE HYDROCHLORIDE 25 ML: 5 INJECTION, SOLUTION EPIDURAL; INFILTRATION; PERINEURAL at 10:51

## 2021-11-10 RX ADMIN — FENTANYL CITRATE 100 MCG: 0.05 INJECTION, SOLUTION INTRAMUSCULAR; INTRAVENOUS at 09:59

## 2021-11-10 RX ADMIN — CEFAZOLIN 2000 MG: 10 INJECTION, POWDER, FOR SOLUTION INTRAVENOUS at 13:37

## 2021-11-10 RX ADMIN — Medication 0.1 MG: at 10:24

## 2021-11-10 RX ADMIN — GABAPENTIN 600 MG: 300 CAPSULE ORAL at 20:05

## 2021-11-10 ASSESSMENT — PULMONARY FUNCTION TESTS
PIF_VALUE: 0
PIF_VALUE: 1
PIF_VALUE: 0

## 2021-11-10 ASSESSMENT — PAIN SCALES - GENERAL
PAINLEVEL_OUTOF10: 0
PAINLEVEL_OUTOF10: 7
PAINLEVEL_OUTOF10: 9
PAINLEVEL_OUTOF10: 10
PAINLEVEL_OUTOF10: 8
PAINLEVEL_OUTOF10: 2
PAINLEVEL_OUTOF10: 7
PAINLEVEL_OUTOF10: 0
PAINLEVEL_OUTOF10: 8

## 2021-11-10 ASSESSMENT — PAIN DESCRIPTION - ORIENTATION
ORIENTATION: LEFT
ORIENTATION: LEFT

## 2021-11-10 ASSESSMENT — PAIN DESCRIPTION - PAIN TYPE
TYPE: SURGICAL PAIN
TYPE: SURGICAL PAIN

## 2021-11-10 ASSESSMENT — PAIN DESCRIPTION - LOCATION
LOCATION: KNEE
LOCATION: LEG

## 2021-11-10 ASSESSMENT — PAIN DESCRIPTION - DESCRIPTORS
DESCRIPTORS: SHARP;SHOOTING;STABBING
DESCRIPTORS: THROBBING
DESCRIPTORS: CONSTANT;DISCOMFORT;ACHING

## 2021-11-10 ASSESSMENT — PAIN DESCRIPTION - FREQUENCY
FREQUENCY: CONTINUOUS
FREQUENCY: CONTINUOUS

## 2021-11-10 ASSESSMENT — PAIN DESCRIPTION - ONSET
ONSET: ON-GOING
ONSET: ON-GOING

## 2021-11-10 ASSESSMENT — PAIN DESCRIPTION - PROGRESSION
CLINICAL_PROGRESSION: NOT CHANGED
CLINICAL_PROGRESSION: GRADUALLY WORSENING

## 2021-11-10 ASSESSMENT — PAIN - FUNCTIONAL ASSESSMENT
PAIN_FUNCTIONAL_ASSESSMENT: PREVENTS OR INTERFERES SOME ACTIVE ACTIVITIES AND ADLS
PAIN_FUNCTIONAL_ASSESSMENT: 0-10

## 2021-11-10 NOTE — FLOWSHEET NOTE
11/10/21 1002   Social/Functional History   Lives With Spouse   Type of [address] Two level; Able to Live on Main level with bedroom/bathroom   Home Access Stairs to enter with rails   Entrance Stairs - Number of Steps 3-4 steps to enter - 1 rail   Bathroom Shower/Tub Tub/Shower unit   Home Equipment 4 wheeled walker   Receives Help From Family   11/10/2021: SS Note/Discharge planning:  Met with pt in PAT, pt having surgery for a total knee arthroplasty today 11/10 , explained sw role for transition of care and reviewed rehab options and HHC/DME providers for [employer], pt no past HHC, pt plans to return home with help from her  who will transport her home. Pt prefers [facility], ref[facility] made to [facility] for a w/w, bsc and ttb,, will follow for HHC/DME orders, sw to follow post-op.  Electronically signed by LILA Ruff on 11/10/2021 at 10:01 AM

## 2021-11-10 NOTE — OP NOTE
Operative Note      Patient: Wilfred Jaramillo  YOB: 1955  MRN: [id number]    Date of Procedure: 11/10/2021    Pre-Op Diagnosis: LEFT KNEE DJD    Post-Op Diagnosis: Same       Procedure(s):  LEFT KNEE TOTAL ARTHROPLASTY [facility] & NEPHEW)    Surgeon(s): Abraham Cowan DO    Assistant:   Resident: Claude Beaver, DO; Ledy Garay DO; Frantz Wei DO    Anesthesia: Spinal    Estimated Blood Loss (mL): Minimal    Complications: None    Specimens:   ID Type Source Tests Collected by Time Destination   A : BONE LEFT KNEE Bone Bone SURGICAL PATHOLOGY Abraham Cowan DO 11/10/2021 1042        Implants:  Implant Name Type Inv. Item Serial No.  Lot No. LRB No. Used Action   CEMENT BNE 40 GM RADIOPAQUE BA SIMPLEX P  CEMENT BNE 40 GM RADIOPAQUE BA SIMPLEX P  [employer] [facility] [id number] Left 1 Implanted   INSERT TIB SZ 3-4 [id number] XLPE KNEE POST STBL HI FLX LEGION  INSERT TIB SZ 3-4 [id number] XLPE KNEE POST STBL HI FLX LEGION  [facility] [id number] Left 1 Implanted   COMPONENT FEM SZ 4 L KNEE OXINIUM POST STBL REY LEGION  COMPONENT FEM SZ 4 L KNEE OXINIUM POST STBL REY LEGION  [place] [employer]- [id number] Left 1 Implanted   BASEPLATE TIB SZ 4 [id number] ML71MM THK2. 3MM L KNEE TI ALLY NP  BASEPLATE TIB SZ 4 [id number] ML71MM THK2. 3MM L KNEE TI ALLY NP  [employer] NE[employer]att[employer] [id number] Left 1 Implanted   COMPONENT PAT [id number] THK7.5MM STD REY RESURFACE RND NP  COMPONENT PAT [id number] THK7.5MM STD REY RESURFACE RND NP  ROSS AND NEPHEW Pattricia Elizabet [id number] Left 1 Implanted         Drains: * No LDAs found *    Findings: as above    Detailed Description of Procedure:   below    Department of Orthopedic Surgery  Operative Report        Pre-operative Diagnosis:  Left Knee Osteoarthritis    Post-operative Diagnosis:  Left Knee Osteoarthritis    Procedure:  Left Knee Arthroplasty    Surgeon:  Abraham Cowan DO     Assistant(s):  As above    Anesthesia: Spinal anesthesia    Estimated blood loss:  Minimal    Specimens:  As above    Implants:  As above    Complications:  none    Condition:  Stable    Brief Hospital Course:  Maylin Reid is a patient known to Amber Burt DO's practice with persistent complaints of Left knee pain. Knee pain has failed to be relieved by non-operative conservative measures, and has began affecting daily activities of living. After examination of the patient, review of the radiologic studies, and appropriate pre-operative risk assessment, Amber Burt DO recommended Left knee arthroplasty, which the patient was agreeable towards. Operative Course: The patient was seen and identified outside the operative suite, in which the operative site was marked as appropriate by patient, surgeon, staff, and anesthesia. The patient was then taken into the operative suite, transferred to the operative table with all bony prominences and neurovascular structures well padded and protected. A tourniquet was placed high on the proximal thigh of the operative extremity. The patient was sedated under the care of the anesthesia team. The operative site was prepped and draped in standard sterile fashion. The tourniquet was inflated to 300 mmHg. An anterior midline incision was made over the knee with full-thickness flaps reflected revealing the anterior joint capsule. Medial parapatellar arthrotomy was performed reflecting the patella laterally. Anterior fat pad and anterior horns of the lateral and medial meniscus were sharply excised. The knee was flexed up. Anterior cruciate ligament and posterior cruciate ligament were then excised. All osteophytes on the distal femur were removed with rongeur. At this point, drilling of the intramedullary canal of the distal femur was then performed. Distal femoral cutting jig was then placed and pinned in appropriate position.  An oscillating saw was used to make the distal femoral cut, discarding the pieces of cut femoral bone and sent for study. The posterior reference guide was then placed on the distal femur after the intramedullary guide and the distal femoral cutting guide were then removed. The posterior referencing guide was then pinned in appropriate position. Soren wing was used to confirm appropriate femoral trial size according to pre-operative templating. Posterior reference guide was then removed. An appropriate sized 4-in-1 cutting guide was applied to the distal femur. Oscillating saw was used to make the anterior, posterior, and chamfer cuts. The 4-in-1 cutting guide was then removed. Attention was turned to the tibia. Intramedullary drilling was then performed of the proximal tibia. The intramedullary guide with the proximal tibial cutting guide was then placed on the tibia. Proximal tibial cutting guide was then pinned in appropriate position. After pinning the proximal tibial cutting guide in appropriate position, oscillating saw was then used to make the proximal tibial cut. The guide was then removed. The proximal tibia that was cut was sharply excised and removed. At this time, all osteophytes on the proximal tibia were then removed with a rongeur. Tensiometer was then placed in extension and flexion, confirming appropriate ligamentous balancing. The box-cutting guide for the posterior-stabilized knee was then placed on the distal femur. The box was then cut in the distal femur without complication. Box-cutting guide was then removed. An appropriately sized trial tibial baseplate and a polyethylene component trial were then placed into the knee. The appropriately sized femur trial component was then placed. The knee was reduced and taken through a range of motion and found to be appropriately stable. Half pins were then placed in the tibial base plate confirming position in preparation for keel punch. The patella was then prepared.   The patella surface was free hand cut for the appropriate size implant. The patella holes were drilled and the patella was then trialed. There was good alignment and tracking of the patella. Distal femoral component was removed, trial poly was then removed. Keel punch was then performed of the proximal tibial. Tibial base plate was then removed. Copious irrigation was performed of the wound and final inspection for all interposed soft tissue and loose bodies was then performed as cement was being mixed. Copious irrigation was performed once again of the knee. Cement was placed on the proximal tibial component and the tibial component was then impacted into appropriate position with all excess extruded cement being removed with Onley elevator. A polyethylene component was then impacted into appropriate position and checked for stability, which it was stable. Cement was then placed on the distal femoral component. Distal femoral component was then impacted in appropriate position with all excess extruded cement being removed with a Onley elevator. The knee was extended, taken through a range of motion, and found to be appropriately stable. Final inspection for all excess extruded cement was then performed. Onley elevator removed all excess extruded cement. Copious irrigation was performed of the knee. The knee was then soaked with a bedadine solution soak for 3 minutes. The knee was then throughly irrigated with saline solution. Then 1 gram of vanomycin powder was placed within the wound. The capsule was  then closed with strata-fix closure. I then injected our TXA mixture into the knee joint. Copious irrigation was performed of this layer followed by, 2-0 Vicryl for the subcutaneous tissues, and skin staples was used to secure incision. A sterile layered dressing was placed over the wound. Tourniquet was deflated. The patient was awakened from anesthesia, transferred to the hospital bed, and taken to the PACU in stable condition.      Disposition: The patient was taken to PACU in stable condition. Once stable, the patient will be transferred to the floor. Orders have been provided to begin physical therapy, weight bear as tolerated Left lower extremity. Patient received a dose of antibiotics preoperatively. We will continue this for 24 hours postoperatively for infection prophylaxis. The patient will also be started on asa for DVT prophylaxis. We have consulted  and case management for discharge planning and consulted the PCP for medical management.      Electronically signed by Iris Ward DO on 11/10/2021 at 12:12 PM

## 2021-11-10 NOTE — CONSULTS
Department of Internal Medicine  Internal Medicine Consultation Note    Primary Care Physician: Lupillo Morales MD   Admitting Physician:  Lizbeth Dxeter DO  Admission date and time: 11/10/2021  8:42 AM    Room:  [address] [place] [address]  Admitting diagnosis: Primary osteoarthritis of one knee, left [M17.12]      Patient Name: Cynthia Dawn  MRN: [id number]    Date of Service: 11/10/2021     Reason for consultation:  Medical management    History of present illness:    Patient is 81-year-old female who is status post left total knee arthroplasty. Patient does have expected postoperative left knee pain. Otherwise she denies any chest pain or shortness of breath. She denies any lightheadedness or dizziness. She did tolerate oral intake. She is not having any nausea or emesis.     PAST MEDICAL Hx:  Past Medical History:   Diagnosis Date    Arthritis     back, cervical neck    Asthma     Breast mass     fibercystic     Chronic sinusitis     Colon polyps     COPD (chronic obstructive pulmonary disease) (HCC)     controlled with inhalers    Depression     Diabetes mellitus ([address]     Diabetic polyneuropathy (HCC)     Diverticulitis     Diverticulosis     GERD (gastroesophageal reflux disease)     Hyperlipidemia     Hypertension     Mitral valve prolapse     f/u w/ PCP only - no symptoms    Neuropathy     Overactive thyroid gland     Shortness of breath     Sleep apnea     Thyroid disease        PAST SURGICAL Hx:   Past Surgical History:   Procedure Laterality Date    ABDOMINAL EXPLORATION SURGERY      APPENDECTOMY  1968    BRONCHOSCOPY  Oct 2013    Bronchoscopy and Mediastinoscopy    CARPAL TUNNEL RELEASE      bilat    CHOLECYSTECTOMY, LAPAROSCOPIC N/A 5/12/2020    CHOLECYSTECTOMY LAPAROSCOPIC WITH IOC performed by Monique Martinez MD at [facility] ARTHROSCOPY Left 8/4/2021    LEFT KNEE ARTHROSCOPY, MEDIAL MENISCECTOMY AND DEBRIDEMENT performed by Lizbeth Dexter DO at [address] [place] [address] OVARY REMOVAL  10/1995    left    TONSILLECTOMY  1972    UPPER GASTROINTESTINAL ENDOSCOPY N/A 1/23/2020    EGD BIOPSY performed by Cinthia Miller MD at [address] Hx:  Family History   Problem Relation Age of Onset    Cancer Mother     Cancer Father     Cancer Maternal Grandmother     Cancer Maternal Grandfather     No Known Problems Paternal Grandfather     No Known Problems Paternal Grandmother        HOME MEDICATIONS:  Prior to Admission medications    Medication Sig Start Date End Date Taking? Authorizing Provider   oxyCODONE-acetaminophen (PERCOCET) 5-325 MG per tablet Take 1 tablet by mouth every 6 hours as needed for Pain for up to 7 days. Intended supply: 7 days.  Take lowest dose possible to manage pain 11/10/21 11/17/21 Yes Odell Olvera DO   aspirin 325 MG EC tablet Take 1 tablet by mouth 2 times daily for 28 days 11/10/21 12/8/21 Yes Odell Olvera DO   ondansetron (ZOFRAN-ODT) 4 MG disintegrating tablet Take 1 tablet by mouth every 8 hours as needed for Nausea or Vomiting 5/12/20  Yes Cinthia Miller MD   budesonide-formoterol (SYMBICORT) 160-4.5 MCG/ACT AERO Inhale 2 puffs into the lungs 2 times daily   Yes Historical Provider, MD   buPROPion (WELLBUTRIN XL) 150 MG extended release tablet Take 150 mg by mouth every morning   Yes Historical Provider, MD   montelukast (SINGULAIR) 10 MG tablet nightly  2/26/20  Yes Historical Provider, MD   lisinopril-hydrochlorothiazide (PRINZIDE;ZESTORETIC) 20-12.5 MG per tablet Take 1 tablet by mouth daily   Yes Historical Provider, MD   DULoxetine (CYMBALTA) 60 MG extended release capsule Take 60 mg by mouth daily   Yes Historical Provider, MD   cetirizine (ZYRTEC) 10 MG tablet Take 10 mg by mouth daily   Yes Historical Provider, MD   fluticasone (FLONASE) 50 MCG/ACT nasal spray 1 spray by Nasal route daily 3/7/17  Yes Oliver Parker DO   meloxicam (MOBIC) 7.5 MG tablet Take 7.5 mg by mouth daily    Yes Historical Provider, MD oxybutynin (DITROPAN) 5 MG tablet Take 5 mg by mouth 3 times daily    Yes Historical Provider, MD   ezetimibe (ZETIA) 10 MG tablet Take 10 mg by mouth daily   Yes Historical Provider, MD   lovastatin (MEVACOR) 40 MG tablet Take 40 mg by mouth nightly. Yes Historical Provider, MD   metoprolol (LOPRESSOR) 25 MG tablet Take 25 mg by mouth nightly. Yes Historical Provider, MD   metFORMIN (GLUCOPHAGE) 500 MG tablet Take 500 mg by mouth daily (with breakfast)    Yes Historical Provider, MD   levothyroxine (SYNTHROID) 50 MCG tablet Take 75 mcg by mouth Daily    Yes Historical Provider, MD   gabapentin (NEURONTIN) 600 MG tablet Take 600 mg by mouth 3 times daily. Yes Historical Provider, MD   sertraline (ZOLOFT) 100 MG tablet Take 100 mg by mouth nightly. Takes 1and 1/2 tablets nightly   Yes Historical Provider, MD   Cholecalciferol (VITAMIN D3) 1.25 MG (73260 UT) CAPS take 1 capsule by mouth THREE TIMES A WEEK 8/7/21   Historical Provider, MD   RA VITAMIN B-12 100 MCG tablet take 1 tablet by mouth once daily  Patient not taking: Reported on 10/25/2021 8/7/21   Historical Provider, MD   Multiple Vitamins-Minerals (THERAPEUTIC MULTIVITAMIN-MINERALS) tablet Take 1 tablet by mouth daily   Patient not taking: Reported on 10/25/2021    Historical Provider, MD   ferrous sulfate 325 (65 Fe) MG tablet Take 1 tablet by mouth daily (with breakfast) Take with your multi vitamin daily. Patient not taking: Reported on 10/25/2021 1/31/20   Ta Voss MD   vitamin D (ERGOCALCIFEROL) 1.25 MG (84235 UT) CAPS capsule Take 1 capsule by mouth Twice a Week 1/30/20   Ta Voss MD   omeprazole (PRILOSEC) 20 MG capsule Take 20 mg by mouth daily. Historical Provider, MD   albuterol (PROVENTIL HFA;VENTOLIN HFA) 108 (90 BASE) MCG/ACT inhaler Inhale 2 puffs into the lungs every 6 hours as needed.     Historical Provider, MD       ALLERGIES:  Other and Codeine    SOCIAL Hx:  Social History     Socioeconomic History    Marital status:      Spouse name: Not on file    Number of children: Not on file    Years of education: Not on file    Highest education level: Not on file   Occupational History    Not on file   Tobacco Use    Smoking status: Former Smoker     Packs/day: 4.00     Years: 45.00     Pack years: 180.00     Types: Cigarettes     Quit date: 2013     Years since quittin.1    Smokeless tobacco: Never Used    Tobacco comment: Uses E-cig   Vaping Use    Vaping Use: Every day    Last attempt to quit: 2020   Substance and Sexual Activity    Alcohol use: No     Comment: 8 cans pop daily    Drug use: No    Sexual activity: Yes   Other Topics Concern    Not on file   Social History Narrative    Not on file     Social Determinants of Health     Financial Resource Strain:     Difficulty of Paying Living Expenses: Not on file   Food Insecurity:     Worried About Running Out of Food in the Last Year: Not on file    Wild of Food in the Last Year: Not on file   Transportation Needs:     Lack of Transportation (Medical): Not on file    Lack of Transportation (Non-Medical):  Not on file   Physical Activity:     Days of Exercise per Week: Not on file    Minutes of Exercise per Session: Not on file   Stress:     Feeling of Stress : Not on file   Social Connections:     Frequency of Communication with Friends and Family: Not on file    Frequency of Social Gatherings with Friends and Family: Not on file    Attends [employer] Services: Not on file    Active Member of Clubs or Organizations: Not on file    Attends Club or Organization Meetings: Not on file    Marital Status: Not on file   Intimate Partner Violence:     Fear of Current or Ex-Partner: Not on file    Emotionally Abused: Not on file    Physically Abused: Not on file    Sexually Abused: Not on file   Housing Stability:     Unable to Pay for Housing in the Last Year: Not on file    Number of [place] in the Last Year: Not on file    Unstable Housing in the Last Year: Not on file       ROS: Positive in bold  General:   Denies chills, fatigue, fever, malaise, night sweats or weight loss    Psychological:   Denies anxiety, disorientation or hallucinations    ENT:    Denies epistaxis, headaches, vertigo or visual changes    Cardiovascular:   Denies any chest pain, irregular heartbeats, or palpitations. No paroxysmal nocturnal dyspnea. Respiratory:   Denies shortness of breath, coughing, sputum production, hemoptysis, or wheezing. No orthopnea. Gastrointestinal:   Denies nausea, vomiting, diarrhea, or constipation. Denies any abdominal pain. Denies change in bowel habits or stools. Genito-Urinary:    Denies any urgency, frequency, hematuria. Voiding without difficulty. Musculoskeletal:   Denies joint pain, joint stiffness, joint swelling or muscle pain    Neurology:    Denies any headache or focal neurological deficits. No weakness or paresthesia. Derm:    Denies any rashes, ulcers, or excoriations. Denies bruising. Extremities:   Denies any lower extremity swelling or edema. PHYSICAL EXAM: Abnormal findings noted  VITALS:  Vitals:    11/10/21 1541   BP: 109/63   Pulse: 78   Resp: 16   Temp: 98.1 °F (36.7 °C)   SpO2: 97%         CONSTITUTIONAL:    Awake, alert, cooperative, no apparent distress, and appears stated age    EYES:    PERRL, EOMI, sclera clear without icterus, conjunctiva normal    ENT:    Normocephalic, atraumatic, sinuses nontender on palpation. External ears without lesions. Oral pharynx with moist mucus membranes. Tonsils without erythema or exudates. NECK:    Supple, symmetrical, trachea midline, no adenopathy, thyroid symmetric, not enlarged and no tenderness, skin normal, no bruits, no JVD    HEMATOLOGIC/LYMPHATICS:    No cervical lymphadenopathy and no supraclavicular lymphadenopathy    LUNGS:    Symmetric.  No increased work of breathing, good air exchange, clear to auscultation bilaterally, no wheezes, rhonchi, or rales,     CARDIOVASCULAR:    Normal apical impulse, regular rate and rhythm, normal S1 and S2, no S3 or S4, and no murmur noted    ABDOMEN:    Normal contour, normal bowel sounds, soft, non-distended, non-tender, no masses palpated, no hepatosplenomegaly, no rebound or guarding elicited on palpation     MUSCULOSKELETAL:    There is no redness, warmth, or swelling of the joints. Full range of motion noted. Motor strength is 5 out of 5 all extremities bilaterally. Tone is normal.    NEUROLOGIC:    Awake, alert, oriented to name, place and time. Cranial nerves II-XII are grossly intact. Motor is 5 out of 5 bilaterally. SKIN:    No bruising or bleeding. No redness, warmth, or swelling    EXTREMITIES:    Peripheral pulses present. No edema, cyanosis, or swelling. LINES/CATHETERS     LABORATORY DATA:  CBC with Differential:    Lab Results   Component Value Date    WBC 7.9 11/05/2021    RBC 4.21 11/05/2021    HGB 11.3 11/10/2021    HCT 34.2 11/10/2021     11/05/2021    MCV 91.0 11/05/2021    MCH 30.4 11/05/2021    MCHC 33.4 11/05/2021    RDW 12.2 11/05/2021    SEGSPCT 66 09/20/2013    LYMPHOPCT 28.0 11/05/2021    MONOPCT 4.4 11/05/2021    BASOPCT 0.4 11/05/2021    MONOSABS 0.35 11/05/2021    LYMPHSABS 2.20 11/05/2021    EOSABS 0.20 11/05/2021    BASOSABS 0.03 11/05/2021     CMP:    Lab Results   Component Value Date     11/05/2021    K 3.8 11/05/2021    K 4.6 07/29/2021    CL 97 11/05/2021    CO2 26 11/05/2021    BUN 17 11/05/2021    CREATININE 1.0 11/05/2021    GFRAA >60 11/05/2021    LABGLOM 56 11/05/2021    GLUCOSE 159 11/05/2021    GLUCOSE 107 03/09/2012    PROT 7.6 11/05/2021    LABALBU 4.5 11/05/2021    LABALBU 4.5 03/09/2012    CALCIUM 9.6 11/05/2021    BILITOT 0.4 11/05/2021    ALKPHOS 100 11/05/2021    AST 14 11/05/2021    ALT 11 11/05/2021       ASSESSMENT/PLAN:  1. Status post left total knee arthroplasty  2. Asthma  3. COPD  4.  Obstructive sleep apnea without use of CPAP/BiPAP currently  5. Diabetes mellitus  6. Diabetic peripheral neuropathy  7. GERD  8. Hyperlipidemia  9. Hypertension  10. Mitral valve prolapse  11. Mild tricuspid regurgitation  12. Mild mitral regurgitation  13. Hypothyroidism  14. Depression  15. History of tobacco abuse currently using e-cigarette    Patient is status post left total knee arthroplasty per orthopedic surgery. Patient does have expected postoperative pain. Further pain management, diet, activity per orthopedic surgery. Home medications resumed per orthopedic surgery. Routine blood work ordered.       Dharmesh Mendez  4:40 PM  11/10/2021    Electronically signed by Chuy Arroyo DO on 11/10/21 at 4:40 PM EST

## 2021-11-10 NOTE — PROGRESS NOTES
Physical Therapy Initial Evaluation/Plan of Care    Room #:  0637/0318-62  Patient Name: Marcelo Rivera  YOB: 1955  MRN: [id number]    Date of Service: 11/10/2021     Tentative placement recommendation: Home Health PT 5 days a week   Equipment recommendation: Todd Bradley, Bedside commode and Tub transfer bench      Evaluating Physical Therapist: Eden Talbot #[id number]     Specific Provider Orders/Date/Referring Provider :  11/10/21 1345   PT eval and treat Start: 11/10/21 1345, End: 11/10/21 1345, ONE TIME, Standing Count: 1 Occurrences, R    Amelie Seymour,       Admitting Diagnosis:   Primary osteoarthritis of one knee, left [M17.12]   Visit diagnosis:   Visit Diagnoses       Codes    Status post total left knee replacement     Z96.652        Surgery:     left Total knee arthroplasty (TKA)    Patient Active Problem List   Diagnosis    Gastroesophageal reflux disease without esophagitis    Type 2 diabetes mellitus with stage 3 chronic kidney disease, without long-term current use of insulin ([address]    Essential hypertension    Mixed hyperlipidemia    Chronic obstructive pulmonary disease ([address]    Moderate obesity    Exertional dyspnea    Primary osteoarthritis of left knee    Primary osteoarthritis of one knee, left       ASSESSMENT of current deficits: Patient exhibits decreased strength, balance, endurance, range of motion and pain left knee impairing functional mobility, transfers, gait , gait distance and tolerance to activity  cues for left knee extension in stance phase of gait. Patient requires skilled physical therapy to address concerns listed above to increase safety and independence at discharge.         PHYSICAL THERAPY  PLAN OF CARE       Physical therapy plan of care is established based on physician order,  patient diagnosis and clinical assessment    Current Treatment Recommendations:    -Bed Mobility: Lower extremity exercises   -Sitting Balance: Incorporate reaching activities to activate trunk muscles   -Standing Balance: Perform strengthening exercises in standing to promote motor control with or without upper extremity support   -Transfers: Provide instruction on proper hand and foot position for adequate transfer of weight onto lower extremities and use of gait device if needed and Cues for hand placement, technique and safety. Provide stabilization to prevent fall   -Gait: Gait training, Standing activities to improve: base of support, weight shift, weight bearing  and Pregait training to emphasize: Sequencing , Device control, Upright, left knee extension in stance phase of gait and Safety   -Endurance: Utilize Supervised activities to increase level of endurance to allow for safe functional mobility including transfers and gait   -Stairs: Stair training with instruction on proper technique and hand placement on rail    PT long term treatment goals are located in below grid    Patient and or family understand(s) diagnosis, prognosis, and plan of care. Frequency of treatments: Patient will be seen  twice daily  for therapeutic exercise, functional retraining, endurance activities, balance exercises, family and patient education.        Prior Level of Function: Patient ambulated independently     Rehab Potential: good    for baseline    Past medical history:   Past Medical History:   Diagnosis Date    Arthritis     back, cervical neck    Asthma     Breast mass     fibercystic     Chronic sinusitis     Colon polyps     COPD (chronic obstructive pulmonary disease) ([address]     controlled with inhalers    Depression     Diabetes mellitus ([address]     Diabetic polyneuropathy ([address]     Diverticulitis     Diverticulosis     GERD (gastroesophageal reflux disease)     Hyperlipidemia     Hypertension     Mitral valve prolapse     f/u w/ PCP only - no symptoms    Neuropathy     Overactive thyroid gland     Shortness of breath     Sleep apnea     Thyroid disease Past Surgical History:   Procedure Laterality Date    ABDOMINAL EXPLORATION SURGERY      APPENDECTOMY  1968    BRONCHOSCOPY  Oct 2013    Bronchoscopy and Mediastinoscopy    CARPAL TUNNEL RELEASE      bilat    CHOLECYSTECTOMY, LAPAROSCOPIC N/A 5/12/2020    CHOLECYSTECTOMY LAPAROSCOPIC WITH IOC performed by Glenna Clarke MD at [facility] ER ARTHROSCOPY Left 8/4/2021    LEFT KNEE ARTHROSCOPY, MEDIAL MENISCECTOMY AND DEBRIDEMENT performed by Ghulam Kirkland DO at [address] [place] [address] Drive  10/1995    left    TONSILLECTOMY  1972    UPPER GASTROINTESTINAL ENDOSCOPY N/A 1/23/2020    EGD BIOPSY performed by Glenna Clarke MD at [facility]:    Precautions:   , falls ,left lower extremity FWB (full weight bearing)      Social history: Patient lives with spouse in a two story home resides first  with 3-4 steps  to enter with Rail  Tub shower      Equipment owned: None ,       [facility]   How much difficulty turning over in bed?: A Little  How much difficulty sitting down on / standing up from a chair with arms?: A Little  How much difficulty moving from lying on back to sitting on side of bed?: A Little  How much help from another person moving to and from a bed to a chair?: A Little  How much help from another person needed to walk in hospital room?: A Little  How much help from another person for climbing 3-5 steps with a railing?: A Lot  AM-PAC Inpatient Mobility Raw Score : 17  AM-PAC Inpatient T-Scale Score : 42.13  Mobility Inpatient CMS 0-100% Score: 50.57  Mobility Inpatient CMS G-Code Modifier : CK    Nursing cleared patient for PT evaluation. The admitting diagnosis and active problem list as listed above have been reviewed prior to the initiation of this evaluation. OBJECTIVE:   Initial Evaluation  Date: 11/10/2021 Treatment Date: Short Term/ Long Term   Goals   Was pt agreeable to Eval/treatment?  Yes Pain Level  8/10   Left knee        Bed Mobility  Rolling: Not assessed     Supine to sit: Supervision     Sit to supine: Not assessed     Scooting: Supervision     Rolling: Independent    Supine to sit:  Independent    Sit to supine: Independent    Scooting: Independent     Transfers Sit to stand: Minimal assist of 1 Cues for hand placement and safety   Sit to stand: Modified Independent     Ambulation    1 x 4 side steps, 1 x 5 feet using  wheeled walker with Minimal assist of 1   for walker control, balance, upright and Left extension in stance phase of gait and cues for sequencing and left knee extension in stance phase of gait  100 feet using  wheeled walker with Modified Independent    Stair negotiation: ascended and descended   Not assessed       3 steps 1 rail with supervision    ROM Within functional limits except Left knee 0-90°     Increase range of motion 10% of affected joints    Strength Within functional limits  except  Left lower extremity 3-/5  Within functional limits   Balance Sitting EOB:  good     Dynamic Standing:  fair    wheeled walker   Sitting EOB:  good    Dynamic Standing:  good wheeled walker      Patient is Alert & Oriented x person, place, time and situation and follows directions    Sensation:  Patient reports numbness/tingling bilateral lower extremities  slight  Edema:  yes left lower extremity   Vitals: 2 liters  Blood Pressure at rest   Blood Pressure during session     Heart Rate at rest 74 Heart Rate during session     SPO2 at rest 100%  SPO2 during session  %     Patient education  Patient educated on role of Physical Therapy, risks of immobility, safety and plan of care,  importance of mobility while in hospital , ankle pumps, quad set and glut set for edema control, blood clot prevention, weight bearing status  and left knee extension in bed and during stance phase of gait      Patient response to education:   Pt verbalized understanding Pt demonstrated skill Pt requires further education in this area   Yes Partial Yes       Treatment:  Patient practiced and was instructed in the following treatment:     Therapist educated and facilitated patient on techniques to increase safety and independence with bed mobility, balance, functional transfers, and functional mobility. Instruction knee extension in bed with kneecap and toes pointing to ceiling  Instruction and performance of incentive inspirometer. Patient performed ankle pumps, quad sets, glut sets x 15-20 each. Active assist heelslide, hip abduction/adduction, straight leg raise x 10 each    Sat edge of bed 10 minutes with Supervision  to increase dynamic sitting balance and activity tolerance. At end of session, patient in chair with spouse present call light and phone within reach,   all lines and tubes intact, nursing notified. Patient would benefit from continued skilled Physical Therapy to improve functional independence and quality of life. Patient's/ family goals   home      Patient and or family understand(s) diagnosis, prognosis, and plan of care. Frequency of treatments: Patient will be seen  twice daily  for therapeutic exercise, functional retraining, endurance activities, balance exercises, family and patient education. Time in  1555  Time out  1627    Total Treatment Time  12 minutes    Evaluation time includes thorough review of current medical information, gathering information on past medical history/social history and prior level of function, completion of standardized testing/informal observation of tasks, assessment of data, and development of Plan of care and goals.      CPT codes:  Low Complexity PT evaluation (93516)  Therapeutic activities (26935)   12 minutes  1 unit(s)    Sandy Castillo, PT

## 2021-11-10 NOTE — PLAN OF CARE
Problem: Pain:  Goal: Pain level will decrease  Outcome: Met This Shift  Note: Pain is less than 3 after being medicated     Problem: Pain:  Goal: Control of acute pain  Outcome: Met This Shift     Problem: Pain:  Goal: Control of chronic pain  Outcome: Met This Shift     Problem: Falls - Risk of:  Goal: Will remain free from falls  Outcome: Met This Shift  Note: No falls     Problem: Falls - Risk of:  Goal: Absence of physical injury  Outcome: Met This Shift     Problem: Skin Integrity:  Goal: Will show no infection signs and symptoms  Outcome: Met This Shift     Problem: Falls - Risk of:  Goal: Absence of physical injury  Outcome: Met This Shift     Problem: Skin Integrity:  Goal: Will show no infection signs and symptoms  Outcome: Met This Shift     Problem: Skin Integrity:  Goal: Absence of new skin breakdown  Outcome: Met This Shift

## 2021-11-10 NOTE — ANESTHESIA PROCEDURE NOTES
Spinal Block    Start time: 11/10/2021 10:17 AM  End time: 11/10/2021 10:18 AM  Reason for block: primary anesthetic  Staffing  Performed: resident/CRNA   Anesthesiologist: Bhavya Elam MD  Resident/CRNA: MAN Lucio CRNA  Preanesthetic Checklist  Completed: patient identified, IV checked, site marked, risks and benefits discussed, surgical consent, monitors and equipment checked, pre-op evaluation, timeout performed, anesthesia consent given, oxygen available and patient being monitored  Spinal Block  Patient position: sitting  Prep: ChloraPrep  Patient monitoring: cardiac monitor, continuous pulse ox, continuous capnometry and frequent blood pressure checks  Approach: midline  Location: L3/L4  Provider prep: mask and sterile gloves  Local infiltration: lidocaine  Agent: bupivacaine  Dose: 2  Dose: 2  Needle  Needle type: pencil-tip   Needle gauge: 25 G  Needle length: 3.5 in  Assessment  Sensory level: T8  Swirl obtained: Yes  CSF: clear  Attempts: 1  Hemodynamics: stable

## 2021-11-10 NOTE — CARE COORDINATION
11/10/2021: SS Note/Discharge planning:  SS Consult noted for post-op d/c planning and sw confirmed d/c plan for pt to return home with [facility], agency accepted referral for start of care on Friday 11/12, [address] [place] [address] to deliver a w/w, ttb and bsc to pt's hospital room tomorrow before 12 noon, nursing informed. Electronically signed by LILA Alcocer on 11/10/2021 at 4:24 PM

## 2021-11-10 NOTE — PROGRESS NOTES
Dr. Iwona Desai notified via telephone of the patient's trending BP's, he will be over to evaluate at the bedside. The patient's spouse also notified that the patient is expected to stay in recovery until an inpatient bed becomes available. He verbalized understanding.

## 2021-11-10 NOTE — ANESTHESIA PROCEDURE NOTES
Peripheral Block    Patient location during procedure: pre-op  Staffing  Performed: anesthesiologist   Anesthesiologist: Kashmir Rm MD  Preanesthetic Checklist  Completed: patient identified, IV checked, site marked, risks and benefits discussed, surgical consent, monitors and equipment checked, pre-op evaluation, timeout performed, anesthesia consent given, oxygen available and patient being monitored  Peripheral Block  Patient position: supine  Prep: DuraPrep  Patient monitoring: cardiac monitor, continuous pulse ox and IV access  Block type: Saphenous  Laterality: left  Injection technique: single-shot  Guidance: ultrasound guided  Provider prep: mask and sterile gloves  Needle  Needle type: combined needle/nerve stimulator   Needle localization: ultrasound guidance  Assessment  Injection assessment: negative aspiration for heme, no paresthesia on injection and local visualized surrounding nerve on ultrasound  Paresthesia pain: none  Slow fractionated injection: yes  Hemodynamics: stable  Medications Administered  Ropivacaine (NAROPIN) injection 0.5%, 25 mL  Reason for block: post-op pain management

## 2021-11-10 NOTE — ANESTHESIA POSTPROCEDURE EVALUATION
Department of Anesthesiology  Postprocedure Note    Patient: Elizabeth Current  MRN: [id number]  YOB: 1955  Date of evaluation: 11/10/2021  Time:  1:25 PM     Procedure Summary     Date: 11/10/21 Room / Location: [address] [place] [address] / [address] [place] [address]    Anesthesia Start: 1008 Anesthesia Stop: 8141    Procedure: LEFT KNEE TOTAL ARTHROPLASTY [facility]) (Left Knee) Diagnosis: (LEFT KNEE DJD)    Surgeons: Arthur Sanchez DO Responsible Provider: Vikash Cunha MD    Anesthesia Type: general, spinal ASA Status: 3          Anesthesia Type: general, spinal    Hillary Phase I: Hillary Score: 8    Hillary Phase II:      Last vitals: Reviewed and per EMR flowsheets.        Anesthesia Post Evaluation    Patient location during evaluation: PACU  Patient participation: complete - patient participated  Level of consciousness: awake and alert  Airway patency: patent  Nausea & Vomiting: no nausea and no vomiting  Complications: no  Cardiovascular status: hemodynamically stable  Respiratory status: acceptable  Hydration status: euvolemic

## 2021-11-10 NOTE — PROGRESS NOTES
Dr. Lisa Rand on for Dr. Amanda Giron, informed him of the patient's vital signs and H&H results. Okay to discharge to the third floor.

## 2021-11-10 NOTE — PROGRESS NOTES
Dr. Andrea Rick at the bedside to evaluate patient. Order placed for H&H. Labs sent at [phone].

## 2021-11-10 NOTE — H&P
Updated H&p        Chief Complaint   Patient presents with    Knee Pain       Left knee to discuss L TKA mid November          Edi Sommers returns today for follow-up of her left knee pain. she reports this is worse than when I saw her last.  The patient's pain level is a 10/10.  The previous treatment was not successful.     Past Medical History        Past Medical History:   Diagnosis Date    Arthritis       back, cervical neck    Asthma      Breast mass       fibercystic     Chronic sinusitis      Colon polyps      COPD (chronic obstructive pulmonary disease) ([facility])       controlled with inhalers    Depression      Diabetes mellitus ([address]      Diabetic polyneuropathy ([facility])      Diverticulitis      Diverticulosis      GERD (gastroesophageal reflux disease)      Hyperlipidemia      Hypertension      Mitral valve prolapse       f/u w/ PCP only - no symptoms    Neuropathy      Overactive thyroid gland      Shortness of breath      Sleep apnea      Thyroid disease           Past Surgical History         Past Surgical History:   Procedure Laterality Date    ABDOMINAL EXPLORATION SURGERY        APPENDECTOMY   1968    BRONCHOSCOPY   Oct 2013     Bronchoscopy and Mediastinoscopy    CARPAL TUNNEL RELEASE         bilat    CHOLECYSTECTOMY, LAPAROSCOPIC N/A 5/12/2020     CHOLECYSTECTOMY LAPAROSCOPIC WITH IOC performed by Shukri Ricks MD at [facility] 1 ARTHROSCOPY Left 8/4/2021     LEFT KNEE ARTHROSCOPY, MEDIAL MENISCECTOMY AND DEBRIDEMENT performed by Tonya Serrano DO at [address] [place] [address]   10/1995     left    TONSILLECTOMY   1972    UPPER GASTROINTESTINAL ENDOSCOPY N/A 1/23/2020     EGD BIOPSY performed by Shukri Ricks MD at [place] [address]           Current Medication      Current Outpatient Medications:     Cholecalciferol (VITAMIN D3) 1.25 MG (28024 UT) CAPS, take 1 capsule by mouth THREE TIMES A WEEK, Disp: , Rfl:     ondansetron (ZOFRAN-ODT) 4 MG disintegrating tablet, Take 1 tablet by mouth every 8 hours as needed for Nausea or Vomiting, Disp: 20 tablet, Rfl: 1    budesonide-formoterol (SYMBICORT) 160-4.5 MCG/ACT AERO, Inhale 2 puffs into the lungs 2 times daily, Disp: , Rfl:     buPROPion (WELLBUTRIN XL) 150 MG extended release tablet, Take 150 mg by mouth every morning, Disp: , Rfl:     montelukast (SINGULAIR) 10 MG tablet, nightly , Disp: , Rfl:     vitamin D (ERGOCALCIFEROL) 1.25 MG (66205 UT) CAPS capsule, Take 1 capsule by mouth Twice a Week, Disp: 24 capsule, Rfl: 0    lisinopril-hydrochlorothiazide (PRINZIDE;ZESTORETIC) 20-12.5 MG per tablet, Take 1 tablet by mouth daily, Disp: , Rfl:     DULoxetine (CYMBALTA) 60 MG extended release capsule, Take 60 mg by mouth daily, Disp: , Rfl:     cetirizine (ZYRTEC) 10 MG tablet, Take 10 mg by mouth daily, Disp: , Rfl:     fluticasone (FLONASE) 50 MCG/ACT nasal spray, 1 spray by Nasal route daily, Disp: 1 Bottle, Rfl: 3    meloxicam (MOBIC) 7.5 MG tablet, Take 7.5 mg by mouth daily , Disp: , Rfl:     oxybutynin (DITROPAN) 5 MG tablet, Take 5 mg by mouth 3 times daily , Disp: , Rfl:     ezetimibe (ZETIA) 10 MG tablet, Take 10 mg by mouth daily, Disp: , Rfl:     omeprazole (PRILOSEC) 20 MG capsule, Take 20 mg by mouth daily. , Disp: , Rfl:     lovastatin (MEVACOR) 40 MG tablet, Take 40 mg by mouth nightly., Disp: , Rfl:     metoprolol (LOPRESSOR) 25 MG tablet, Take 25 mg by mouth nightly., Disp: , Rfl:     metFORMIN (GLUCOPHAGE) 500 MG tablet, Take 500 mg by mouth daily (with breakfast) , Disp: , Rfl:     levothyroxine (SYNTHROID) 50 MCG tablet, Take 75 mcg by mouth Daily , Disp: , Rfl:     gabapentin (NEURONTIN) 600 MG tablet, Take 600 mg by mouth 3 times daily. , Disp: , Rfl:     sertraline (ZOLOFT) 100 MG tablet, Take 100 mg by mouth nightly.  Takes 1and 1/2 tablets nightly, Disp: , Rfl:     albuterol (PROVENTIL HFA;VENTOLIN HFA) 108 (90 BASE) MCG/ACT inhaler, Inhale 2 puffs into the lungs every 6 hours as needed. , Disp: , Rfl:     aspirin 81 MG tablet, Take 81 mg by mouth daily. , Disp: , Rfl:     RA VITAMIN B-12 100 MCG tablet, take 1 tablet by mouth once daily (Patient not taking: Reported on 10/25/2021), Disp: , Rfl:     Multiple Vitamins-Minerals (THERAPEUTIC MULTIVITAMIN-MINERALS) tablet, Take 1 tablet by mouth daily  (Patient not taking: Reported on 10/25/2021), Disp: , Rfl:     ferrous sulfate 325 (65 Fe) MG tablet, Take 1 tablet by mouth daily (with breakfast) Take with your multi vitamin daily. (Patient not taking: Reported on 10/25/2021), Disp: 30 tablet, Rfl: 1           Allergies   Allergen Reactions    Food Other (See Comments)       Lactose Intolerance - Milk and Ice Cream Only    Milk-Related Compounds Other (See Comments)       Intolerance       Other         Enviromental       Codeine Nausea And Vomiting       itching      Social History               Socioeconomic History    Marital status:        Spouse name: Not on file    Number of children: Not on file    Years of education: Not on file    Highest education level: Not on file   Occupational History    Not on file   Tobacco Use    Smoking status: Former Smoker       Packs/day: 4.00       Years: 45.00       Pack years: 180.00       Types: Cigarettes       Quit date: 2013       Years since quittin.1    Smokeless tobacco: Never Used    Tobacco comment: Uses E-cig   Vaping Use    Vaping Use: Every day    Last attempt to quit: 2020   Substance and Sexual Activity    Alcohol use:  No       Comment: 8 cans pop daily    Drug use: No    Sexual activity: Yes   Other Topics Concern    Not on file   Social History Narrative    Not on file      Social Determinants of Health          Financial Resource Strain:     Difficulty of Paying Living Expenses:    Food Insecurity:     Worried About Running Out of Food in the Last Year:     [address] in the Last Year:    Transportation Needs:     Lack of Transportation (Medical):  Lack of Transportation (Non-Medical):    Physical Activity:     Days of Exercise per Week:     Minutes of Exercise per Session:    Stress:     Feeling of Stress :    Social Connections:     Frequency of Communication with Friends and Family:     Frequency of Social Gatherings with Friends and Family:     Attends Sabianism Services:     Active Member of Clubs or Organizations:     Attends Club or Organization Meetings:     Marital Status:    Intimate Partner Violence:     Fear of Current or Ex-Partner:     Emotionally Abused:     Physically Abused:     Sexually Abused:          Family History         Family History   Problem Relation Age of Onset    Cancer Mother      Cancer Father      Cancer Maternal Grandmother      Cancer Maternal Grandfather      No Known Problems Paternal Grandfather      No Known Problems Paternal Grandmother              Review of Systems:      Skin: (-) rash,(-) psoriasis,(-) eczema, (-)skin cancer. Musculoskeletal: (-) fractures,  (-) dislocations,(-) collagen vascular disease, (-) fibromyalgia, (-) multiple sclerosis, (-) muscular dystrophy, (-) RSD,(-) joint pain (-)swelling, (-) joint pain,swelling. Neurologic: (-) epilepsy, (-)seizures,(-) brain tumor,(-) TIA, (-)stroke, (-)headaches, (-)Parkinson disease,(-) memory loss, (-) LOC.   Cardiovascular: (-) Chest pain, (-) swelling in legs/feet, (-) SOB, (-) cramping in legs/feet with walking.     Constitutional:         Vital signs are stable.  In general, patient is awake, alert and oriented X3, in no apparent distress.  Examination of HENT reveals normocephalic, atraumatic.  PERRLA/EOMI sclera are white.  Conjunctivae are clear.  TM's are intact.  Pharynx is pink and moist.  Uvula and tongue are midline.  Heart: Positive S1 and positive S2 with regular rate and rhythm.  Lungs: Clear to auscultation bilaterally without rales, rhonchi or wheezes.  Abdomen: soft, nontender.  Positive 115. The patient does have  pain on motion, effusion is mild, there is tenderness over the  global region, there are not any masses, there is not ligamentous instability, there is  deformity noted.    Knee exam: bilateral positive for moderate crepitations, some mild tenderness laxity is not noted with  stress. Jasmina Cornelius is not a popliteal cyst.     R. Knee:  Lachman's negative, Anterior Drawer negative, Posterior Drawer negative  Marysol's positive, Thallasy  positive,   PF grind test positive, Apprehension test negative, Patellar J sign  negative  L. Knee:  Lachman's negative, Anterior Drawer negative, Posterior Drawer negative  Marysol's positive, Thallasy  positive,   PF grind test positive, Apprehension test negative,  Patellar J sign  negative     Xray Exam:  Left knee: Moderate osteoarthritis at the medial weight-bearing compartment   with milder involvement elsewhere.  Very small joint effusion.  No fracture   or dislocation.       Right knee: Moderate osteoarthritis at the medial weight-bearing compartment   with milder involvement elsewhere.  No fracture or dislocation.  Normal soft   tissues.      MRI:  Medial meniscus tear.       Tricompartment degenerative changes most significant to the medial   compartment.       Trace knee joint effusion.            Radiographic findings reviewed with patient     Impression:       Encounter Diagnosis   Name Primary?  Primary osteoarthritis of left knee Yes         Plan:   Natural history and expected course discussed. Questions answered. Educational materials distributed. Rest, ice, compression, and elevation (RICE) therapy. Reduction in offending activity. I had a lengthy discussion with the patient regarding their diagnosis. I explained treatment options including surgical vs non surgical treatment. I reviewed in detail the risks and benefits and outlined the procedure in detail with expected outcomes and possible complications.   I also discussed non surgical treatment such as injections (CSI and visco supplementation), physical therapy, topical creams and NSAID's. They have elected for surgical management at this time. We discussed various treatment options both surgical and non-surgical.   The patient is unable to ambulate more than 100 feet and is unable to perform the average daily activities including:  Light housework, ADLs, donning clothes, toileting and exercise. Patient has failed previous conservative measures including cortisone injections, NSAIDs, PT, HEP and pain medication and is currently a fall risk to the disability and decreased functioning. The patient wishes to have the total knee arthroplasty. The risks and benefits of a total knee replacement were discussed with the patient. The risks include but are not limited to: infection, injury to blood vessels and nerves, non relief of symptoms, arthrofibrosis of knee, aseptic loosening of prosthesis, intraoperative fracture, blood loss, PE/DVT, MI, dislocation of hip and knee, need for further operative intervention and death. The patient is aware of the risks and wished to proceed with a Left total knee replacement 11/10/21. The patient was counseled at length about the risks of félix Covid-19 during their perioperative period and any recovery window from their procedure.  The patient was made aware that félix Covid-19  may worsen their prognosis for recovering from their procedure  and lend to a higher morbidity and/or mortality risk.  All material risks, benefits, and reasonable alternatives including postponing the procedure were discussed.  The patient does wish to proceed with the procedure at this time.

## 2021-11-11 VITALS
HEIGHT: 61 IN | SYSTOLIC BLOOD PRESSURE: 90 MMHG | BODY MASS INDEX: 37.38 KG/M2 | OXYGEN SATURATION: 100 % | DIASTOLIC BLOOD PRESSURE: 58 MMHG | RESPIRATION RATE: 16 BRPM | WEIGHT: 198 LBS | TEMPERATURE: 98.2 F | HEART RATE: 70 BPM

## 2021-11-11 LAB
ALBUMIN SERPL-MCNC: 3.3 G/DL (ref 3.5–5.2)
ALP BLD-CCNC: 65 U/L (ref 35–104)
ALT SERPL-CCNC: 8 U/L (ref 0–32)
ANION GAP SERPL CALCULATED.3IONS-SCNC: 13 MMOL/L (ref 7–16)
AST SERPL-CCNC: 21 U/L (ref 0–31)
BASOPHILS ABSOLUTE: 0.02 E9/L (ref 0–0.2)
BASOPHILS RELATIVE PERCENT: 0.1 % (ref 0–2)
BILIRUB SERPL-MCNC: 0.2 MG/DL (ref 0–1.2)
BUN BLDV-MCNC: 22 MG/DL (ref 6–23)
CALCIUM SERPL-MCNC: 8.3 MG/DL (ref 8.6–10.2)
CHLORIDE BLD-SCNC: 100 MMOL/L (ref 98–107)
CO2: 17 MMOL/L (ref 22–29)
CREAT SERPL-MCNC: 1.3 MG/DL (ref 0.5–1)
EOSINOPHILS ABSOLUTE: 0.01 E9/L (ref 0.05–0.5)
EOSINOPHILS RELATIVE PERCENT: 0.1 % (ref 0–6)
GFR AFRICAN AMERICAN: 50
GFR NON-AFRICAN AMERICAN: 41 ML/MIN/1.73
GLUCOSE BLD-MCNC: 131 MG/DL (ref 74–99)
HBA1C MFR BLD: 6.3 % (ref 4–5.6)
HCT VFR BLD CALC: 28.8 % (ref 34–48)
HEMOGLOBIN: 9.3 G/DL (ref 11.5–15.5)
IMMATURE GRANULOCYTES #: 0.09 E9/L
IMMATURE GRANULOCYTES %: 0.6 % (ref 0–5)
LYMPHOCYTES ABSOLUTE: 1.33 E9/L (ref 1.5–4)
LYMPHOCYTES RELATIVE PERCENT: 9.4 % (ref 20–42)
MAGNESIUM: 1.3 MG/DL (ref 1.6–2.6)
MCH RBC QN AUTO: 30.7 PG (ref 26–35)
MCHC RBC AUTO-ENTMCNC: 32.3 % (ref 32–34.5)
MCV RBC AUTO: 95 FL (ref 80–99.9)
METER GLUCOSE: 140 MG/DL (ref 74–99)
MONOCYTES ABSOLUTE: 0.88 E9/L (ref 0.1–0.95)
MONOCYTES RELATIVE PERCENT: 6.2 % (ref 2–12)
NEUTROPHILS ABSOLUTE: 11.81 E9/L (ref 1.8–7.3)
NEUTROPHILS RELATIVE PERCENT: 83.6 % (ref 43–80)
PDW BLD-RTO: 12.6 FL (ref 11.5–15)
PHOSPHORUS: 4 MG/DL (ref 2.5–4.5)
PLATELET # BLD: 266 E9/L (ref 130–450)
PMV BLD AUTO: 9.5 FL (ref 7–12)
POTASSIUM SERPL-SCNC: 4.6 MMOL/L (ref 3.5–5)
RBC # BLD: 3.03 E12/L (ref 3.5–5.5)
SODIUM BLD-SCNC: 130 MMOL/L (ref 132–146)
T4 FREE: 1.01 NG/DL (ref 0.93–1.7)
TOTAL PROTEIN: 5.7 G/DL (ref 6.4–8.3)
TSH SERPL DL<=0.05 MIU/L-ACNC: 1.13 UIU/ML (ref 0.27–4.2)
WBC # BLD: 14.1 E9/L (ref 4.5–11.5)

## 2021-11-11 PROCEDURE — 83036 HEMOGLOBIN GLYCOSYLATED A1C: CPT

## 2021-11-11 PROCEDURE — 83735 ASSAY OF MAGNESIUM: CPT

## 2021-11-11 PROCEDURE — 80053 COMPREHEN METABOLIC PANEL: CPT

## 2021-11-11 PROCEDURE — 6370000000 HC RX 637 (ALT 250 FOR IP): Performed by: INTERNAL MEDICINE

## 2021-11-11 PROCEDURE — 36415 COLL VENOUS BLD VENIPUNCTURE: CPT

## 2021-11-11 PROCEDURE — 6360000002 HC RX W HCPCS: Performed by: ORTHOPAEDIC SURGERY

## 2021-11-11 PROCEDURE — 84100 ASSAY OF PHOSPHORUS: CPT

## 2021-11-11 PROCEDURE — 84439 ASSAY OF FREE THYROXINE: CPT

## 2021-11-11 PROCEDURE — 82962 GLUCOSE BLOOD TEST: CPT

## 2021-11-11 PROCEDURE — 6370000000 HC RX 637 (ALT 250 FOR IP): Performed by: ORTHOPAEDIC SURGERY

## 2021-11-11 PROCEDURE — 97165 OT EVAL LOW COMPLEX 30 MIN: CPT

## 2021-11-11 PROCEDURE — 85025 COMPLETE CBC W/AUTO DIFF WBC: CPT

## 2021-11-11 PROCEDURE — G0378 HOSPITAL OBSERVATION PER HR: HCPCS

## 2021-11-11 PROCEDURE — 97116 GAIT TRAINING THERAPY: CPT

## 2021-11-11 PROCEDURE — 97535 SELF CARE MNGMENT TRAINING: CPT

## 2021-11-11 PROCEDURE — 94640 AIRWAY INHALATION TREATMENT: CPT

## 2021-11-11 PROCEDURE — 97530 THERAPEUTIC ACTIVITIES: CPT

## 2021-11-11 PROCEDURE — 84443 ASSAY THYROID STIM HORMONE: CPT

## 2021-11-11 PROCEDURE — 96374 THER/PROPH/DIAG INJ IV PUSH: CPT

## 2021-11-11 RX ORDER — NICOTINE POLACRILEX 4 MG
15 LOZENGE BUCCAL PRN
Status: DISCONTINUED | OUTPATIENT
Start: 2021-11-11 | End: 2021-11-11 | Stop reason: HOSPADM

## 2021-11-11 RX ORDER — DEXTROSE MONOHYDRATE 25 G/50ML
12.5 INJECTION, SOLUTION INTRAVENOUS PRN
Status: DISCONTINUED | OUTPATIENT
Start: 2021-11-11 | End: 2021-11-11 | Stop reason: HOSPADM

## 2021-11-11 RX ORDER — DEXTROSE MONOHYDRATE 50 MG/ML
100 INJECTION, SOLUTION INTRAVENOUS PRN
Status: DISCONTINUED | OUTPATIENT
Start: 2021-11-11 | End: 2021-11-11 | Stop reason: HOSPADM

## 2021-11-11 RX ORDER — MAGNESIUM OXIDE 400 MG/1
400 TABLET ORAL 3 TIMES DAILY
Status: DISCONTINUED | OUTPATIENT
Start: 2021-11-11 | End: 2021-11-11 | Stop reason: HOSPADM

## 2021-11-11 RX ADMIN — ONDANSETRON 4 MG: 2 INJECTION INTRAMUSCULAR; INTRAVENOUS at 00:32

## 2021-11-11 RX ADMIN — MAGNESIUM OXIDE 400 MG: 400 TABLET ORAL at 11:20

## 2021-11-11 RX ADMIN — GABAPENTIN 600 MG: 300 CAPSULE ORAL at 13:54

## 2021-11-11 RX ADMIN — FERROUS SULFATE TAB 325 MG (65 MG ELEMENTAL FE) 325 MG: 325 (65 FE) TAB at 09:16

## 2021-11-11 RX ADMIN — ASPIRIN 325 MG: 325 TABLET, COATED ORAL at 09:13

## 2021-11-11 RX ADMIN — DULOXETINE HYDROCHLORIDE 60 MG: 60 CAPSULE, DELAYED RELEASE ORAL at 09:16

## 2021-11-11 RX ADMIN — OXYBUTYNIN CHLORIDE 5 MG: 5 TABLET ORAL at 09:54

## 2021-11-11 RX ADMIN — OXYCODONE 10 MG: 5 TABLET ORAL at 09:15

## 2021-11-11 RX ADMIN — GABAPENTIN 600 MG: 300 CAPSULE ORAL at 09:15

## 2021-11-11 RX ADMIN — BUDESONIDE 500 MCG: 0.5 INHALANT RESPIRATORY (INHALATION) at 05:44

## 2021-11-11 RX ADMIN — ACETAMINOPHEN 650 MG: 325 TABLET ORAL at 05:19

## 2021-11-11 RX ADMIN — LEVOTHYROXINE SODIUM 50 MCG: 0.05 TABLET ORAL at 05:20

## 2021-11-11 RX ADMIN — CETIRIZINE HYDROCHLORIDE 10 MG: 10 TABLET, FILM COATED ORAL at 09:15

## 2021-11-11 RX ADMIN — METFORMIN HYDROCHLORIDE 500 MG: 500 TABLET ORAL at 09:16

## 2021-11-11 RX ADMIN — OXYCODONE 10 MG: 5 TABLET ORAL at 05:19

## 2021-11-11 RX ADMIN — MELOXICAM 3.75 MG: 7.5 TABLET ORAL at 09:14

## 2021-11-11 RX ADMIN — ALBUTEROL SULFATE 2.5 MG: 2.5 SOLUTION RESPIRATORY (INHALATION) at 05:44

## 2021-11-11 RX ADMIN — PANTOPRAZOLE SODIUM 40 MG: 40 TABLET, DELAYED RELEASE ORAL at 05:20

## 2021-11-11 RX ADMIN — INSULIN LISPRO 2 UNITS: 100 INJECTION, SOLUTION INTRAVENOUS; SUBCUTANEOUS at 11:21

## 2021-11-11 RX ADMIN — BUPROPION HYDROCHLORIDE 150 MG: 150 TABLET, EXTENDED RELEASE ORAL at 09:17

## 2021-11-11 RX ADMIN — ARFORMOTEROL TARTRATE 15 MCG: 15 SOLUTION RESPIRATORY (INHALATION) at 05:44

## 2021-11-11 RX ADMIN — EZETIMIBE 10 MG: 10 TABLET ORAL at 09:54

## 2021-11-11 RX ADMIN — HYDROCHLOROTHIAZIDE 12.5 MG: 12.5 TABLET ORAL at 09:14

## 2021-11-11 RX ADMIN — FLUTICASONE PROPIONATE 1 SPRAY: 50 SPRAY, METERED NASAL at 09:13

## 2021-11-11 RX ADMIN — OXYCODONE 10 MG: 5 TABLET ORAL at 13:51

## 2021-11-11 RX ADMIN — OXYCODONE 10 MG: 5 TABLET ORAL at 00:32

## 2021-11-11 RX ADMIN — LISINOPRIL 20 MG: 20 TABLET ORAL at 09:15

## 2021-11-11 ASSESSMENT — PAIN - FUNCTIONAL ASSESSMENT
PAIN_FUNCTIONAL_ASSESSMENT: PREVENTS OR INTERFERES SOME ACTIVE ACTIVITIES AND ADLS
PAIN_FUNCTIONAL_ASSESSMENT: ACTIVITIES ARE NOT PREVENTED
PAIN_FUNCTIONAL_ASSESSMENT: ACTIVITIES ARE NOT PREVENTED

## 2021-11-11 ASSESSMENT — PAIN DESCRIPTION - PAIN TYPE
TYPE: ACUTE PAIN;SURGICAL PAIN
TYPE: ACUTE PAIN;SURGICAL PAIN

## 2021-11-11 ASSESSMENT — PAIN SCALES - GENERAL
PAINLEVEL_OUTOF10: 4
PAINLEVEL_OUTOF10: 6
PAINLEVEL_OUTOF10: 7
PAINLEVEL_OUTOF10: 4
PAINLEVEL_OUTOF10: 8
PAINLEVEL_OUTOF10: 4
PAINLEVEL_OUTOF10: 6
PAINLEVEL_OUTOF10: 4

## 2021-11-11 ASSESSMENT — PAIN DESCRIPTION - FREQUENCY
FREQUENCY: INTERMITTENT
FREQUENCY: CONTINUOUS

## 2021-11-11 ASSESSMENT — PAIN DESCRIPTION - PROGRESSION
CLINICAL_PROGRESSION: GRADUALLY IMPROVING
CLINICAL_PROGRESSION: NOT CHANGED
CLINICAL_PROGRESSION: GRADUALLY IMPROVING

## 2021-11-11 ASSESSMENT — PAIN DESCRIPTION - ONSET
ONSET: ON-GOING
ONSET: GRADUAL
ONSET: ON-GOING

## 2021-11-11 ASSESSMENT — PAIN DESCRIPTION - LOCATION
LOCATION: KNEE
LOCATION: KNEE

## 2021-11-11 ASSESSMENT — PAIN DESCRIPTION - ORIENTATION
ORIENTATION: LEFT
ORIENTATION: LEFT

## 2021-11-11 ASSESSMENT — PAIN DESCRIPTION - DESCRIPTORS
DESCRIPTORS: ACHING;DISCOMFORT;CONSTANT
DESCRIPTORS: ACHING;DISCOMFORT;SORE

## 2021-11-11 NOTE — PROGRESS NOTES
[address] [facility]  [facility] [place]. [place]        Date:2021                                                  Patient Name: Nash Emerson    MRN: [id number]    : 1955    Room: [address] [place] [address]      Evaluating OT: David Espinoza OTR/L; [id number]     Referring Provider and Specific Provider Orders/Date:      11/10/21 134  OT eval and treat  Start:  11/10/21 134,   End:  11/10/21 134,   ONE TIME,   Standing Count:  1 Occurrences,   R        Order went unreviewed at transfer on Wed Nov 10, 2021  3:50 PM    Daniele Awan DO     Placement Recommendation: HHOT       Diagnosis:   1. Primary osteoarthritis of left knee    2.  Status post total left knee replacement         Surgery: L TKA       Pertinent Medical History:       Past Medical History:   Diagnosis Date    Arthritis     back, cervical neck    Asthma     Breast mass     fibercystic     Chronic sinusitis     Colon polyps     COPD (chronic obstructive pulmonary disease) ([facility])     controlled with inhalers    Depression     Diabetes mellitus ([address]     Diabetic polyneuropathy (HCC)     Diverticulitis     Diverticulosis     GERD (gastroesophageal reflux disease)     Hyperlipidemia     Hypertension     Mitral valve prolapse     f/u w/ PCP only - no symptoms    Neuropathy     Overactive thyroid gland     Shortness of breath     Sleep apnea     Thyroid disease          Past Surgical History:   Procedure Laterality Date    ABDOMINAL EXPLORATION SURGERY      APPENDECTOMY  1968    BRONCHOSCOPY  Oct 2013    Bronchoscopy and Mediastinoscopy    CARPAL TUNNEL RELEASE      bilat    CHOLECYSTECTOMY, LAPAROSCOPIC N/A 2020    CHOLECYSTECTOMY LAPAROSCOPIC WITH IOC performed by Francisco eMraz MD at [facility] ARTHROSCOPY Left 2021    LEFT KNEE ARTHROSCOPY, MEDIAL MENISCECTOMY AND DEBRIDEMENT performed by Daniele Awan DO at [address]  10/1995    left    TONSILLECTOMY  1972    UPPER GASTROINTESTINAL ENDOSCOPY N/A 1/23/2020    EGD BIOPSY performed by Sidra Barnes MD at [facility] ENDOSCOPY      Precautions:  Fall Risk, full weight bearing: L LE d/t TKA, impulsive, fast paced      Assessment of current deficits    [x] Functional mobility  [x]ADLs  [x] Strength               []Cognition    [x] Functional transfers   [x] IADLs         [] Safety Awareness   [x]Endurance    [] Fine Coordination              [x] Balance      [] Vision/perception   []Sensation     []Gross Motor Coordination  [x] ROM  [] Delirium                   [] Motor Control     OT PLAN OF CARE   OT POC based on physician orders, patient diagnosis and results of clinical assessment    Frequency/Duration 1-3 days/wk for 2 weeks PRN     Specific OT Treatment Interventions to include:   * Instruction/training on adapted ADL techniques and AE recommendations to increase functional independence within precautions       * Training on energy conservation strategies, correct breathing pattern and techniques to improve independence/tolerance for self-care routine  * Functional transfer/mobility training/DME recommendations for increased independence, safety, and fall prevention  * Patient/Family education to increase follow through with safety techniques and functional independence  * Recommendation of environmental modifications for increased safety with functional transfers/mobility and ADLs  * Therapeutic exercise to improve motor endurance, ROM, and functional strength for ADLs/functional transfers  * Therapeutic activities to facilitate/challenge dynamic balance, stand tolerance for increased safety and independence with ADLs  * Positioning to improve skin integrity, interaction with environment and functional independence    Recommended Adaptive Equipment: wheeled walker, tub transfer bench, bedside commode. Reacher was provided.        Home Living: with family: spouse; single family home, 2 story, resides on 1st floor, 3-4 steps to enter with rail, tub shower on 1st floor. Equipment owned: none    Prior Level of Function: Independent with ADLs , Independent with IADLs; ambulated with no device, pt intends to sleep on the couch, two twin sized beds brought downstairs for her spouse and daughter    Driving: yes   Occupation: retired from [employer]     Pain Level: 5/10 pain in L knee; Nursing notified. Cognition: A&O: 4/4; Follows 3 step directions   Memory: good    Sequencing: good    Problem solving: good    Judgement/safety: fair plus     [facility]   AM-PAC Daily Activity Inpatient   How much help for putting on and taking off regular lower body clothing?: A Little  How much help for Bathing?: A Little  How much help for Toileting?: A Little  How much help for putting on and taking off regular upper body clothing?: A Little  How much help for taking care of personal grooming?: A Little  How much help for eating meals?: None  AM-[facility] Inpatient Daily Activity Raw Score: 19  AM-PAC Inpatient ADL T-Scale Score : 40.22  ADL Inpatient CMS 0-100% Score: 42.8  ADL Inpatient CMS G-Code Modifier : CK     Functional Assessment:    Initial Eval Status  Date: 11/11/21 Treatment Status  Date: STGs = LTGs  Time frame: 10-14 days   Feeding Independent   Independent    Grooming Supervision   Independent    UB Dressing Supervision to [facility] gown while standing in front of chair and don bra and tank top while seated in bedside chair. Independent    LB Dressing Supervision to thread underpants and pants while seated in bedside chair then stood to bring up around hips and waist.   Independent    Bathing Minimal Assist  Modified [place]    Toileting Supervision for hygiene after using commode to urinate   Independent    Bed Mobility  Supine to sit: Supervision.   Instruction and training in L LE extension in bed for proper position as pt was side lying on right upon entering the room. Sit to supine: N/T as pt was up in chair   Supine to sit: Independent   Sit to supine: Independent    Functional Transfers Supervision from EOB to wheeled walker. Supervision or transfer to and from low commode with minimal verbal cues to use grab bar for safe commode transfer. Supervision for transfer to and from bedside chair during dressing tasks. Transfer training with verbal cues for hand placement throughout session to improve safety. Independent    Functional Mobility Supervision with wheeled walker to improve balance to and from bathroom and up to bedside chair, verbal cues for walker sequence and safety. Modified [place]    Balance Sitting:     Static: good     Dynamic: fair plus  Standing: fair plus with wheeled walker      Activity Tolerance Fair plus  good    Visual/  Perceptual Glasses: yes                 Hand Dominance: right      AROM (PROM) Strength Additional Info:    RUE  WFL 4/5 good  and wfl FMC/dexterity noted during ADL tasks     LUE WFL 4/5 good  and wfl FMC/dexterity noted during ADL tasks       Hearing: WFL   Sensation:  No c/o numbness or tingling  Tone: WFL   Edema: yes, surgical extremity     Comments: Upon arrival the patient was side lying on there right. At end of session, patient was seated in bedside chair with call light and phone within reach, all lines and tubes intact. Spouse present and supportive. Overall patient demonstrated decreased independence and safety during completion of ADL/functional transfer/mobility tasks. Pt would benefit from continued skilled OT to increase safety and independence with completion of ADL/IADL tasks for functional independence and quality of life.     Treatment: OT treatment provided this date includes:    Instruction/training on safety and adapted techniques for completion of ADLs    Instruction/training on safe functional mobility/transfer techniques    Instruction/training on energy conservation/work simplification for completion of ADLs    Proper Positioning/Alignment    Instruction/training in weight bearing status and walker sequence    Instruction/training in use of adaptive equipment for lower body dressing, demo provided     Instruction/training in lower body dressing techniques     Rehab Potential: Good for established goals. Patient / Family Goal: return home      Patient and/or family were instructed on functional diagnosis, prognosis/goals and OT plan of care. Demonstrated good understanding. Eval Complexity: Low    Time In: 10:43am   Time Out: 11:20am    Total Treatment Time: 17      Min Units   OT Eval Low 97165  X  1    OT Eval Medium 01866      OT Eval High 46493      OT Re-Eval 53569            ADL/Self Care 56589  17  1    Therapeutic Activities 40409       Therapeutic Ex 73394       Orthotic Management 65608       Manual [id number]     Neuro Re-Ed 05748       Non-Billable Time        Evaluation Time additionally includes thorough review of current medical information, gathering information on past medical history/social history and prior level of function, interpretation of standardized testing/informal observation of tasks, assessment of data and development of plan of care and goals.         Evaluating OT: Socorro Means OTR/L; [id number]

## 2021-11-11 NOTE — PLAN OF CARE
Problem: Pain:  Goal: Pain level will decrease  11/11/2021 1056 by Stacie Orr RN  Outcome: Met This Shift     Problem: Pain:  Goal: Control of acute pain  Outcome: Met This Shift     Problem: Pain:  Goal: Control of chronic pain  Outcome: Met This Shift     Problem: Falls - Risk of:  Goal: Will remain free from falls  Outcome: Met This Shift     Problem: Falls - Risk of:  Goal: Absence of physical injury  Outcome: Met This Shift     Problem: Skin Integrity:  Goal: Will show no infection signs and symptoms  11/11/2021 1056 by Stacie Orr RN  Outcome: Met This Shift     Problem: Skin Integrity:  Goal: Absence of new skin breakdown  Outcome: Met This Shift

## 2021-11-11 NOTE — PROGRESS NOTES
Department of Orthopedic Surgery  Resident Progress Note    Patient seen and examined. Pain controlled patient sitting up in bed this morning comfortably. She is having some mild burning pain in the lateral knee, denies any numbness or tingling though. No bowel movements yet.     VITALS:  BP (!) 102/51   Pulse 71   Temp 97.9 °F (36.6 °C) (Oral)   Resp 16   Ht 5' 1\" (1.549 m)   Wt 198 lb (89.8 kg)   LMP 01/01/1993   SpO2 100%   BMI 37.41 kg/m²     General: alert and oriented to person, place and time, well-developed and well-nourished, in no acute distress    MUSCULOSKELETAL:   left lower extremity:  · Dressing C/D/I  · Compartments soft and compressible  · +PF/DF/EHL  · +2/4 DP & PT pulses, Brisk Cap refill, Toes warm and perfused  · Distal sensation grossly intact to Peroneals, Sural, Saphenous, and tibial nrs    CBC:   Lab Results   Component Value Date    WBC 14.1 11/11/2021    HGB 9.3 11/11/2021    HCT 28.8 11/11/2021     11/11/2021     PT/INR:    Lab Results   Component Value Date    PROTIME 11.4 11/05/2021    INR 1.0 11/05/2021           ASSESSMENT  · S/P left TKA on 11/10-POD #1    PLAN      · Continue physical therapy and protocol: WBAT - LLE  · 24 hour abx coverage  · Deep venous thrombosis prophylaxis -  BID, early mobilization  · PT/OT  · Pain Control: IV and PO  · Monitor H&H  · D/C Plan: Home, likely today

## 2021-11-11 NOTE — PLAN OF CARE
Problem: Pain:  Goal: Pain level will decrease  Description: Pain level will decrease  11/11/2021 0309 by Val Patel RN  Outcome: Met This Shift  11/10/2021 1734 by Rubina Quevedo RN  Outcome: Met This Shift  Note: Pain is less than 3 after being medicated  Goal: Control of acute pain  Description: Control of acute pain  11/10/2021 1734 by Rubina Quevedo RN  Outcome: Met This Shift  Goal: Control of chronic pain  Description: Control of chronic pain  11/10/2021 1734 by Rubina Quevedo RN  Outcome: Met This Shift

## 2021-11-11 NOTE — PROGRESS NOTES
CLINICAL PHARMACY NOTE: MEDS TO BEDS    Total # of Prescriptions Filled: 2   The following medications were delivered to the patient:  · Oxycodone-Acetaminophen 5-325mg (Percocet)  · Aspirin 325mg    Additional Documentation:

## 2021-11-11 NOTE — PROGRESS NOTES
Department of Internal Medicine  Internal Medicine Progress Note    Primary Care Physician: Guillaume Tucker MD   Admitting Physician:  Lee Ochoa DO  Admission date and time: 11/10/2021  8:42 AM    Room:  [address] [place] [address]  Admitting diagnosis: Primary osteoarthritis of one knee, left [M17.12]  Status post total left knee replacement [Z96.652]      Patient Name: Lia Gutierrez  MRN: [id number]    Date of Service: 11/11/2021     Reason for consultation:  Medical management    History of present illness:    Patient is 78-year-old female who is status post left total knee arthroplasty. Patient does have expected postoperative left knee pain. Otherwise she denies any chest pain or shortness of breath. She denies any lightheadedness or dizziness. She did tolerate oral intake. She is not having any nausea or emesis. 11/11/2021  Patient seen examined on medical surgical floor. Patient's  is at the bedside and case discussed. Patient denies any problem with any chest pain, abdominal pain, nausea/vomiting, fever/chills, cough or dysuria. Patient has expected postop discomfort. BUN/creatinine 22/1.3 with serum magnesium 1.3. Fasting blood sugar 131. Normal transaminase and thyroid profile. WBC 14.1 with hemoglobin 9.3 with preop hemoglobin 12.8 and WBC 7.9. Temperature is 97.9 with heart rate of 71 blood pressure ranges 102//60.   O2 sat 100% on room air at rest.    PAST MEDICAL Hx:  Past Medical History:   Diagnosis Date    Arthritis     back, cervical neck    Asthma     Breast mass     fibercystic     Chronic sinusitis     Colon polyps     COPD (chronic obstructive pulmonary disease) (HCC)     controlled with inhalers    Depression     Diabetes mellitus ([address]     Diabetic polyneuropathy ([address]     Diverticulitis     Diverticulosis     GERD (gastroesophageal reflux disease)     Hyperlipidemia     Hypertension     Mitral valve prolapse     f/u w/ PCP only - no symptoms    Neuropathy     Overactive thyroid gland     Shortness of breath     Sleep apnea     Thyroid disease        PAST SURGICAL Hx:   Past Surgical History:   Procedure Laterality Date    ABDOMINAL EXPLORATION SURGERY      APPENDECTOMY  1968    BRONCHOSCOPY  Oct 2013    Bronchoscopy and Mediastinoscopy    CARPAL TUNNEL RELEASE      bilat    CHOLECYSTECTOMY, LAPAROSCOPIC N/A 5/12/2020    CHOLECYSTECTOMY LAPAROSCOPIC WITH IOC performed by Tino San MD at [facility] ARTHROSCOPY Left 8/4/2021    LEFT KNEE ARTHROSCOPY, MEDIAL MENISCECTOMY AND DEBRIDEMENT performed by Colletta Paterson, DO at [address]  10/1995    left    TONSILLECTOMY  1972    UPPER GASTROINTESTINAL ENDOSCOPY N/A 1/23/2020    EGD BIOPSY performed by Tino San MD at [address] Hx:  Family History   Problem Relation Age of Onset    Cancer Mother     Cancer Father     Cancer Maternal Grandmother     Cancer Maternal Grandfather     No Known Problems Paternal Grandfather     No Known Problems Paternal Grandmother        HOME MEDICATIONS:  Prior to Admission medications    Medication Sig Start Date End Date Taking? Authorizing Provider   oxyCODONE-acetaminophen (PERCOCET) 5-325 MG per tablet Take 1 tablet by mouth every 6 hours as needed for Pain for up to 7 days. Intended supply: 7 days.  Take lowest dose possible to manage pain 11/10/21 11/17/21 Yes Jamse Pollen, DO   aspirin 325 MG EC tablet Take 1 tablet by mouth 2 times daily for 28 days 11/10/21 12/8/21 Yes Jamse Pollen, DO   ondansetron (ZOFRAN-ODT) 4 MG disintegrating tablet Take 1 tablet by mouth every 8 hours as needed for Nausea or Vomiting 5/12/20  Yes Tino San MD   budesonide-formoterol (SYMBICORT) 160-4.5 MCG/ACT AERO Inhale 2 puffs into the lungs 2 times daily   Yes Historical Provider, MD   buPROPion (WELLBUTRIN XL) 150 MG extended release tablet Take 150 mg by mouth every morning   Yes Historical Provider, MD montelukast (SINGULAIR) 10 MG tablet nightly  2/26/20  Yes Historical Provider, MD   lisinopril-hydrochlorothiazide (PRINZIDE;ZESTORETIC) 20-12.5 MG per tablet Take 1 tablet by mouth daily   Yes Historical Provider, MD   DULoxetine (CYMBALTA) 60 MG extended release capsule Take 60 mg by mouth daily   Yes Historical Provider, MD   cetirizine (ZYRTEC) 10 MG tablet Take 10 mg by mouth daily   Yes Historical Provider, MD   fluticasone (FLONASE) 50 MCG/ACT nasal spray 1 spray by Nasal route daily 3/7/17  Yes Oliver Parker,    meloxicam (MOBIC) 7.5 MG tablet Take 7.5 mg by mouth daily    Yes Historical Provider, MD   oxybutynin (DITROPAN) 5 MG tablet Take 5 mg by mouth 3 times daily    Yes Historical Provider, MD   ezetimibe (ZETIA) 10 MG tablet Take 10 mg by mouth daily   Yes Historical Provider, MD   lovastatin (MEVACOR) 40 MG tablet Take 40 mg by mouth nightly. Yes Historical Provider, MD   metoprolol (LOPRESSOR) 25 MG tablet Take 25 mg by mouth nightly. Yes Historical Provider, MD   metFORMIN (GLUCOPHAGE) 500 MG tablet Take 500 mg by mouth daily (with breakfast)    Yes Historical Provider, MD   levothyroxine (SYNTHROID) 50 MCG tablet Take 75 mcg by mouth Daily    Yes Historical Provider, MD   gabapentin (NEURONTIN) 600 MG tablet Take 600 mg by mouth 3 times daily. Yes Historical Provider, MD   sertraline (ZOLOFT) 100 MG tablet Take 100 mg by mouth nightly.  Takes 1and 1/2 tablets nightly   Yes Historical Provider, MD   Cholecalciferol (VITAMIN D3) 1.25 MG (58968 UT) CAPS take 1 capsule by mouth THREE TIMES A WEEK 8/7/21   Historical Provider, MD   RA VITAMIN B-12 100 MCG tablet take 1 tablet by mouth once daily  Patient not taking: Reported on 10/25/2021 8/7/21   Historical Provider, MD   Multiple Vitamins-Minerals (THERAPEUTIC MULTIVITAMIN-MINERALS) tablet Take 1 tablet by mouth daily   Patient not taking: Reported on 10/25/2021    Historical Provider, MD   ferrous sulfate 325 (65 Fe) MG tablet Take 1 tablet by mouth daily (with breakfast) Take with your multi vitamin daily. Patient not taking: Reported on 10/25/2021 1/31/20   Hipolito Horta MD   vitamin D (ERGOCALCIFEROL) 1.25 MG (96784 UT) CAPS capsule Take 1 capsule by mouth Twice a Week 20   Hipolito Horta MD   omeprazole (PRILOSEC) 20 MG capsule Take 20 mg by mouth daily. Historical Provider, MD   albuterol (PROVENTIL HFA;VENTOLIN HFA) 108 (90 BASE) MCG/ACT inhaler Inhale 2 puffs into the lungs every 6 hours as needed. Historical Provider, MD       ALLERGIES:  Other and Codeine    SOCIAL Hx:  Social History     Socioeconomic History    Marital status:      Spouse name: Not on file    Number of children: Not on file    Years of education: Not on file    Highest education level: Not on file   Occupational History    Not on file   Tobacco Use    Smoking status: Former Smoker     Packs/day: 4.00     Years: 45.00     Pack years: 180.00     Types: Cigarettes     Quit date: 2013     Years since quittin.1    Smokeless tobacco: Never Used    Tobacco comment: Uses E-cig   Vaping Use    Vaping Use: Every day    Last attempt to quit: 2020   Substance and Sexual Activity    Alcohol use: No     Comment: 8 cans pop daily    Drug use: No    Sexual activity: Yes   Other Topics Concern    Not on file   Social History Narrative    Not on file     Social Determinants of Health     Financial Resource Strain:     Difficulty of Paying Living Expenses: Not on file   Food Insecurity:     Worried About Running Out of Food in the Last Year: Not on file    Wild of Food in the Last Year: Not on file   Transportation Needs:     Lack of Transportation (Medical): Not on file    Lack of Transportation (Non-Medical):  Not on file   Physical Activity:     Days of Exercise per Week: Not on file    Minutes of Exercise per Session: Not on file   Stress:     Feeling of Stress : Not on file   Social Connections:     Frequency of Communication with Friends and Family: Not on file    Frequency of Social Gatherings with Friends and Family: Not on file    Attends Confucianism Services: Not on file    Active Member of Clubs or Organizations: Not on file    Attends Club or Organization Meetings: Not on file    Marital Status: Not on file   Intimate Partner Violence:     Fear of Current or Ex-Partner: Not on file    Emotionally Abused: Not on file    Physically Abused: Not on file    Sexually Abused: Not on file   Housing Stability:     Unable to Pay for Housing in the Last Year: Not on file    Number of [place] in the Last Year: Not on file    Unstable Housing in the Last Year: Not on file       ROS: Positive in bold  General:   Denies chills, fatigue, fever, malaise, night sweats or weight loss    Psychological:   Denies anxiety, disorientation or hallucinations    ENT:    Denies epistaxis, headaches, vertigo or visual changes    Cardiovascular:   Denies any chest pain, irregular heartbeats, or palpitations. No paroxysmal nocturnal dyspnea. Respiratory:   Denies shortness of breath, coughing, sputum production, hemoptysis, or wheezing. No orthopnea. Gastrointestinal:   Denies nausea, vomiting, diarrhea, or constipation. Denies any abdominal pain. Denies change in bowel habits or stools. Genito-Urinary:    Denies any urgency, frequency, hematuria. Voiding without difficulty. Musculoskeletal:   Denies joint pain, joint stiffness, joint swelling or muscle pain    Neurology:    Denies any headache or focal neurological deficits. No weakness or paresthesia. Derm:    Denies any rashes, ulcers, or excoriations. Denies bruising. Extremities:   Denies any lower extremity swelling or edema.       PHYSICAL EXAM: Abnormal findings noted  VITALS:  Vitals:    11/11/21 0914   BP: (!) 140/60   Pulse:    Resp:    Temp:    SpO2:          CONSTITUTIONAL:    Awake, alert, cooperative, no apparent distress, and appears stated CMP:    Lab Results   Component Value Date     11/11/2021    K 4.6 11/11/2021    K 4.6 07/29/2021     11/11/2021    CO2 17 11/11/2021    BUN 22 11/11/2021    CREATININE 1.3 11/11/2021    GFRAA 50 11/11/2021    LABGLOM 41 11/11/2021    GLUCOSE 131 11/11/2021    GLUCOSE 107 03/09/2012    PROT 5.7 11/11/2021    LABALBU 3.3 11/11/2021    LABALBU 4.5 03/09/2012    CALCIUM 8.3 11/11/2021    BILITOT 0.2 11/11/2021    ALKPHOS 65 11/11/2021    AST 21 11/11/2021    ALT 8 11/11/2021       ASSESSMENT/PLAN:  1. Status post left total knee arthroplasty  2. Asthma  3. COPD  4. Obstructive sleep apnea without use of CPAP/BiPAP currently  5. Obesity with BMI 37  6. Noninsulin-dependent diabetes mellitus  7. Diabetic peripheral neuropathy  8. GERD  9. Hyperlipidemia  10. Hypertension  11. Mitral valve prolapse  12. Mild tricuspid regurgitation  13. Mild mitral regurgitation  14. Hypothyroidism  15. Depression  16. History of tobacco abuse currently using e-cigarette  17. Mild leukocytosis postop-probably reactive from surgery  18. Normocytic anemia postop-from blood loss from surgery    Patient is status post left total knee arthroplasty per orthopedic surgery. Patient does have expected postoperative pain. Further pain management, diet, activity per orthopedic surgery. Home medications resumed per orthopedic surgery. Routine blood work ordered. Home medications reviewed  Lab work reviewed with patient  Glucoscans 4 times daily with sliding scale insulin  Patient has mild leukocytosis postop which is most likely reactive from surgery with no signs of infection.       Jhon Abbasi DO  9:35 AM  11/11/2021

## 2021-11-11 NOTE — PROGRESS NOTES
Physical Therapy Treatment Note/Plan of Care    Room #:  8958/0782-81  Patient Name: Wilfred Jaramillo  YOB: 1955  MRN: [id number]    Date of Service: 11/11/2021     Tentative placement recommendation: Home Health PT 5 days a week   Equipment recommendation: Nadege Jauregui, Bedside commode and Tub transfer bench      Evaluating Physical Therapist: Eden Shen #[id number]     Specific Provider Orders/Date/Referring Provider :  11/10/21 1345   PT eval and treat Start: 11/10/21 1345, End: 11/10/21 1345, ONE TIME, Standing Count: 1 Occurrences, R    Abraham Code, DO      Admitting Diagnosis:   Primary osteoarthritis of one knee, left [M17.12]  Status post total left knee replacement [Z96.652]   Visit diagnosis:     Surgery:     left Total knee arthroplasty (TKA)    Patient Active Problem List   Diagnosis    Gastroesophageal reflux disease without esophagitis    Type 2 diabetes mellitus with stage 3 chronic kidney disease, without long-term current use of insulin ([address]    Essential hypertension    Mixed hyperlipidemia    Chronic obstructive pulmonary disease ([address]    Moderate obesity    Exertional dyspnea    Primary osteoarthritis of left knee    Primary osteoarthritis of one knee, left    Status post total left knee replacement       ASSESSMENT of current deficits: Patient exhibits decreased strength, balance, endurance, range of motion and pain left knee impairing functional mobility, transfers, gait , gait distance and tolerance to activity Patient impulsive throughout session, requiring cues for safety and pacing throughout d/t patient wanting to rush. Patient encouraged to keep assistive device with her at all times to prevent loss of balance and falls. Patient tolerates inc in ambulation distance with step through gait pattern as well as stair training with 2 HR and with crutches. Patient requires constant cues during crutch training on stairs d/t rushing and not listening to sequencing. Patient's  present throughout session and encouraged to cue patient to slow down and take her time. Patient requires skilled physical therapy to address concerns listed above to increase safety and independence at discharge. PHYSICAL THERAPY  PLAN OF CARE       Physical therapy plan of care is established based on physician order,  patient diagnosis and clinical assessment    Current Treatment Recommendations:    -Bed Mobility: Lower extremity exercises   -Sitting Balance: Incorporate reaching activities to activate trunk muscles   -Standing Balance: Perform strengthening exercises in standing to promote motor control with or without upper extremity support   -Transfers: Provide instruction on proper hand and foot position for adequate transfer of weight onto lower extremities and use of gait device if needed and Cues for hand placement, technique and safety. Provide stabilization to prevent fall   -Gait: Gait training, Standing activities to improve: base of support, weight shift, weight bearing  and Pregait training to emphasize: Sequencing , Device control, Upright, left knee extension in stance phase of gait and Safety   -Endurance: Utilize Supervised activities to increase level of endurance to allow for safe functional mobility including transfers and gait   -Stairs: Stair training with instruction on proper technique and hand placement on rail    PT long term treatment goals are located in below grid    Patient and or family understand(s) diagnosis, prognosis, and plan of care. Frequency of treatments: Patient will be seen  twice daily  for therapeutic exercise, functional retraining, endurance activities, balance exercises, family and patient education.        Prior Level of Function: Patient ambulated independently     Rehab Potential: good    for baseline    Past medical history:   Past Medical History:   Diagnosis Date    Arthritis     back, cervical neck    Asthma     Breast mass fibercystic     Chronic sinusitis     Colon polyps     COPD (chronic obstructive pulmonary disease) (HCC)     controlled with inhalers    Depression     Diabetes mellitus ([address]     Diabetic polyneuropathy (HCC)     Diverticulitis     Diverticulosis     GERD (gastroesophageal reflux disease)     Hyperlipidemia     Hypertension     Mitral valve prolapse     f/u w/ PCP only - no symptoms    Neuropathy     Overactive thyroid gland     Shortness of breath     Sleep apnea     Thyroid disease      Past Surgical History:   Procedure Laterality Date    ABDOMINAL EXPLORATION SURGERY      APPENDECTOMY  1968    BRONCHOSCOPY  Oct 2013    Bronchoscopy and Mediastinoscopy    CARPAL TUNNEL RELEASE      bilat    CHOLECYSTECTOMY, LAPAROSCOPIC N/A 5/12/2020    CHOLECYSTECTOMY LAPAROSCOPIC WITH IOC performed by Monique Martinez MD at [facility] ARTHROSCOPY Left 8/4/2021    LEFT KNEE ARTHROSCOPY, MEDIAL MENISCECTOMY AND DEBRIDEMENT performed by Lizbeth Dexter DO at [address] [place] [address] Drive  10/1995    left   [address] ENDOSCOPY N/A 1/23/2020    EGD BIOPSY performed by Monique Martinez MD at [facility]:    Precautions:   , falls ,left lower extremity FWB (full weight bearing)      Social history: Patient lives with spouse in a two story home resides first  with 3-4 steps  to enter with Rail  Tub shower      Equipment owned: None ,       [address] [facility] Mobility Inpatient   How much difficulty turning over in bed?: None  How much difficulty sitting down on / standing up from a chair with arms?: A Little  How much difficulty moving from lying on back to sitting on side of bed?: A Little  How much help from another person moving to and from a bed to a chair?: A Little  How much help from another person needed to walk in hospital room?: A Little  How much help from another person for climbing 3-5 steps with a railing?: A Little  AM-PAC Inpatient Mobility Raw Score : 19  AM-PAC Inpatient T-Scale Score : 45.44  Mobility Inpatient CMS 0-100% Score: 41.77  Mobility Inpatient CMS G-Code Modifier : CK    Nursing cleared patient for PT treatment. Patient finishing lunch in bedside chair with  present. OBJECTIVE:   Initial Evaluation  Date: 11/10/2021 Treatment Date: 11/11/2021   Short Term/ Long Term   Goals   Was pt agreeable to Eval/treatment? Yes yes    Pain Level  8/10   Left knee   7/10 L knee     Bed Mobility  Rolling: Not assessed     Supine to sit: Supervision     Sit to supine: Not assessed     Scooting: Supervision    Rolling: Not assessed patient in chair      Rolling: Independent    Supine to sit:  Independent    Sit to supine: Independent    Scooting: Independent     Transfers Sit to stand: Minimal assist of 1 Cues for hand placement and safety  Sit to stand: Supervision  cues for hand placement and safety     Sit to stand: Modified Independent     Ambulation    1 x 4 side steps, 1 x 5 feet using  wheeled walker with Minimal assist of 1   for walker control, balance, upright and Left extension in stance phase of gait and cues for sequencing and left knee extension in stance phase of gait 40 feet, 165 feet using  wheeled walker with Supervision    cues for sequencing, decreased speed, safety, pacing and step through vs step to   100 feet using  wheeled walker with Modified Independent    Stair negotiation: ascended and descended   Not assessed    7 steps x2 sets with 2 HR supervision and 4 steps x2 sets with crutches Gene, cues for sequencing, safety and pacing as patient tends to rush and not follow through with sequencing properly      3 steps 1 rail with supervision    ROM Within functional limits except Left knee 0-90°     Increase range of motion 10% of affected joints    Strength Within functional limits  except  Left lower extremity 3-/5  Within functional limits   Balance Sitting EOB:  good Dynamic Standing:  fair    wheeled walker  Sitting EOB: not assessed   Dynamic Standing: fair with wheeled walker   Sitting EOB:  good    Dynamic Standing:  good wheeled walker      Patient is Alert & Oriented x person, place, time and situation and follows directions    Sensation:  Patient reports numbness/tingling bilateral lower extremities  slight  Edema:  yes left lower extremity   Vitals: room air  Blood Pressure at rest   Blood Pressure during session     Heart Rate at rest  Heart Rate during session     SPO2 at rest %  SPO2 during session  %     Patient education  Patient educated on role of Physical Therapy, risks of immobility, safety and plan of care, energy conservation,  importance of mobility while in hospital , safety  and stair training       Patient response to education:   Pt verbalized understanding Pt demonstrated skill Pt requires further education in this area   Yes Partial Yes       Treatment:  Patient practiced and was instructed in the following treatment:     Therapist educated and facilitated patient on techniques to increase safety and independence with bed mobility, balance, functional transfers, and functional mobility. Instruction knee extension in bed with kneecap and toes pointing to ceiling  Instruction and performance of incentive inspirometer. Patient stood from bedside chair to amb in hallway. Patient performed stair training as above with education throughout. Patient amb in hallway and returned to room to use bathroom, cues for use of grab bar, and amb to bedside chair. Ice applied to L knee at end of session. At end of session, patient in chair with spouse present call light and phone within reach,   all lines and tubes intact, nursing notified. Patient would benefit from continued skilled Physical Therapy to improve functional independence and quality of life.        Patient's/ family goals   home      Patient and or family understand(s) diagnosis, prognosis, and plan of care. Frequency of treatments: Patient will be seen  twice daily  for therapeutic exercise, functional retraining, endurance activities, balance exercises, family and patient education.      Time in  1150  Time out  1215    Total Treatment Time  25 minutes    CPT codes:  Therapeutic activities (01105)   10 minutes  1 unit(s)  Gait Training (27744) 15 minutes 1 unit(s)    Herr Signs, PTA #[id number]

## 2021-11-11 NOTE — PROGRESS NOTES
OT BEDSIDE TREATMENT NOTE      Date:2021  Patient Name: Lyric La  MRN: [id number]  : 1955  Room: [address] [place] [address]         Evaluating OT: Abdullahi Corona OTR/L; [id number]      Referring Provider and Specific Provider Orders/Date:      11/10/21 1345   OT eval and treat  Start:  11/10/21 1345,   End:  11/10/21 134,   ONE TIME,   Standing Count:  1 Occurrences,   R        Order went unreviewed at transfer on Wed Nov 10, 2021  3:50 PM    Loretta Miranda DO      Placement Recommendation: HHOT        Diagnosis:   1. Primary osteoarthritis of left knee    2.  Status post total left knee replacement         Surgery: L TKA        Pertinent Medical History:       Past Medical History        Past Medical History:   Diagnosis Date    Arthritis       back, cervical neck    Asthma      Breast mass       fibercystic     Chronic sinusitis      Colon polyps      COPD (chronic obstructive pulmonary disease) (HCC)       controlled with inhalers    Depression      Diabetes mellitus ([address]      Diabetic polyneuropathy (HCC)      Diverticulitis      Diverticulosis      GERD (gastroesophageal reflux disease)      Hyperlipidemia      Hypertension      Mitral valve prolapse       f/u w/ PCP only - no symptoms    Neuropathy      Overactive thyroid gland      Shortness of breath      Sleep apnea      Thyroid disease              Past Surgical History         Past Surgical History:   Procedure Laterality Date    ABDOMINAL EXPLORATION SURGERY        APPENDECTOMY   1968    BRONCHOSCOPY   Oct 2013     Bronchoscopy and Mediastinoscopy    CARPAL TUNNEL RELEASE         bilat    CHOLECYSTECTOMY, LAPAROSCOPIC N/A 2020     CHOLECYSTECTOMY LAPAROSCOPIC WITH IOC performed by Nicholas Naik MD at [facility] 1 ARTHROSCOPY Left 2021     LEFT KNEE ARTHROSCOPY, MEDIAL MENISCECTOMY AND DEBRIDEMENT performed by Loretta Miranda DO at  [address]   10/1995     left    TONSILLECTOMY   1972    UPPER GASTROINTESTINAL ENDOSCOPY N/A 1/23/2020     EGD BIOPSY performed by Mona Hernandez MD at [address]   Precautions:  Fall Risk, full weight bearing: L LE d/t TKA, impulsive, fast paced       Assessment of current deficits    [x]? Functional mobility            [x]?ADLs           [x]? Strength                   []?Cognition    [x]? Functional transfers          [x]? IADLs         []? Safety Awareness   [x]? Endurance    []? Fine Coordination              [x]? Balance      []? Vision/perception    []? Sensation      []? Gross Motor Coordination  [x]? ROM           []? Delirium                   []? Motor Control      OT PLAN OF CARE   OT POC based on physician orders, patient diagnosis and results of clinical assessment     Frequency/Duration 1-3 days/wk for 2 weeks PRN      Specific OT Treatment Interventions to include:   * Instruction/training on adapted ADL techniques and AE recommendations to increase functional independence within precautions       * Training on energy conservation strategies, correct breathing pattern and techniques to improve independence/tolerance for self-care routine  * Functional transfer/mobility training/DME recommendations for increased independence, safety, and fall prevention  * Patient/Family education to increase follow through with safety techniques and functional independence  * Recommendation of environmental modifications for increased safety with functional transfers/mobility and ADLs  * Therapeutic exercise to improve motor endurance, ROM, and functional strength for ADLs/functional transfers  * Therapeutic activities to facilitate/challenge dynamic balance, stand tolerance for increased safety and independence with ADLs  * Positioning to improve skin integrity, interaction with environment and functional independence     Recommended Adaptive Equipment: wheeled walker, tub transfer bench, bedside commode. Reacher was provided.    Home Living: with family: spouse; single family home, 2 story, resides on 1st floor, 3-4 steps to enter with rail, tub shower on 1st floor.         Equipment owned: none     Prior Level of Function: Independent with ADLs , Independent with IADLs; ambulated with no device, pt intends to sleep on the couch, two twin sized beds brought downstairs for her spouse and daughter     Driving: yes   Occupation: retired from [employer]      Pain Level: 5/10 pain in L knee; Nursing notified.          Cognition: A&O: 4/4; Follows 3 step directions              Memory: good               Sequencing: good               Problem solving: good               Judgement/safety: fair plus      [facility]   AM-PAC Daily Activity Inpatient   How much help for putting on and taking off regular lower body clothing?: A Little  How much help for Bathing?: A Little  How much help for Toileting?: A Little  How much help for putting on and taking off regular upper body clothing?: A Little  How much help for taking care of personal grooming?: A Little  How much help for eating meals?: None  AM-[facility] Inpatient Daily Activity Raw Score: 19  AM-PAC Inpatient ADL T-Scale Score : 40.22  ADL Inpatient CMS 0-100% Score: 42.8  ADL Inpatient CMS G-Code Modifier : CK                Functional Assessment:     Initial Eval Status  Date: 11/11/21 Treatment Status  Date:11/11/2021 STGs = LTGs  Time frame: 10-14 days   Feeding Independent  N/T  Independent    Grooming Supervision  Supervision when standing sinkside in clinic to simulate grooming tasks; cuing for sequencing when stepping backward from sink   Independent    UB Dressing Supervision to [facility] gown while standing in front of chair and don bra and tank top while seated in bedside chair.   N/T; pt donned shirt prior to session  Independent    LB Dressing Supervision to thread underpants and pants while seated in bedside chair then stood to bring up around hips and waist.   N/T; pt donned pants prior to session Independent    Bathing Minimal Assist Supervision; Pt education, instruction, demonstration and participation with use of DME in clinic for bathroom safety/safe transfers. Pt able to support LE's to/from tub using extended tub transfer bench     Modified [place]    Toileting Supervision for hygiene after using commode to urinate   Supervision for transfer to/from [facility] over toilet in clinic with cuing for hand placement, joint protection Independent    Bed Mobility  Supine to sit: Supervision. Instruction and training in L LE extension in bed for proper position as pt was side lying on right upon entering the room. Sit to supine: N/T as pt was up in chair  Pt seated in chair on arrival; supervision as pt able to go from sitting to supine and able to position herself in bed  Supine to sit: Independent   Sit to supine: Independent    Functional Transfers Supervision from EOB to wheeled walker. Supervision or transfer to and from low commode with minimal verbal cues to use grab bar for safe commode transfer. Supervision for transfer to and from bedside chair during dressing tasks.   Transfer training with verbal cues for hand placement throughout session to improve safety.   Supervision for sit to stand to/from chair,  to/from w/c and multiple surfaces in clinic with FWW; simulated car transfer at mod I with pt able to support LE's to/from car; transfer to EOB on retrun to room with min A with cuing for safety, as pt attempted to sit prior to reaching EOB d/t fatigue Independent    Functional Mobility Supervision with wheeled walker to improve balance to and from bathroom and up to bedside chair, verbal cues for walker sequence and safety.   Supervision/Min A  with FWW for household distances in room and clinic; no LOB noted; cuing for safety Modified [place]    Balance Sitting:     Static: good     Dynamic: fair plus  Standing: fair plus with wheeled walker  Sitting:     Static: good Dynamic: fair plus  Standing: fair with   wheeled walker       Activity Tolerance Fair plus Fair /fair plus good    Visual/  Perceptual Glasses: yes                        Hand Dominance: right        AROM (PROM) Strength Additional Info:    RUE  WFL 4/5 good  and wfl FMC/dexterity noted during ADL tasks      LUE WFL 4/5 good  and wfl FMC/dexterity noted during ADL tasks         Hearing: WFL   Sensation:  No c/o numbness or tingling  Tone: WFL   Edema: yes, surgical extremity ; BOBY hose donned prior to session     Comments: Patient cleared by nursing staff. Upon arrival pt seated in chair. Pt agreeable to OT tx session.  present. Pt educated with regards to bed mobility, hand placement, safety awareness, static sitting balance,  standing balance, transfer training, functional mobility,  grooming tasks, bathroom safety/DME, toilet transfer, car transfer, tub/shower transfers, ECT's. At end of session pt supine in bed  with all lines and tubes intact, call light within reach. Overall, pt demonstrated fair independence and safety during completion of ADL/functional transfers/mobility tasks. Pt would benefit from continued skilled OT to increase safety and independence with completion of ADL/IADL tasks for functional independence and quality of life. Pt required cues and education as noted above for safe facilitation and completion of tasks. Therapist provided skilled monitoring of patient's response during treatment session. Prior to and at the end of session, environmental modifications completed for patients safety and efficiency of treatment session. Overall, patient demonstrates minimal difficulties with completion of BADLs and IADLs. Factors contributing to these difficulties include pain, decreased endurance, and generalized weakness. As noted above, patient likely to benefit from further OT intervention to increase independence, safety, and overall quality of life.      Treatment: ? Bed mobility: Facilitated bed mobility with cues for proper body mechanics and sequencing to prepare for ADL completion. ? Functional transfers: Facilitated transfers from various surfaces with cues for body alignment, safety and hand placement. ? Postural Balance: Sitting/standing balance retraining to improve righting reactions with postural changes during ADLs. ? Skilled positioning: Proper positioning to improve interaction with environment, overall functioning and decrease edema    · Pt has made fair progress towards set goals   · D/C with  HHOT PRN with family supervision/assist       Treatment Time also includes thorough review of current medical information, gathering information on past medical history/social history and prior level of function, informal observation of tasks, assessment of data and education on plan of care and goals.     Treatment Time In: 12:25 PM     Treatment Time Out: 12:48 PM            Treatment Charges: Mins Units   ADL/Home Mgt     [address]     [id number] 23 2   Ther Ex                 [address] [facility]     Neuro Re-ed         [id number]     Orthotic manage/training                               [id number]     Non Billable Time     Total Timed Treatment 23 [address] [facility], [place]BLEDO/L #[id number]

## 2021-11-19 DIAGNOSIS — M17.12 PRIMARY OSTEOARTHRITIS OF LEFT KNEE: Primary | ICD-10-CM

## 2021-11-23 ENCOUNTER — OFFICE VISIT (OUTPATIENT)
Dept: ORTHOPEDIC SURGERY | Age: 66
End: 2021-11-23

## 2021-11-23 VITALS — WEIGHT: 198 LBS | BODY MASS INDEX: 37.38 KG/M2 | HEIGHT: 61 IN

## 2021-11-23 DIAGNOSIS — Z96.652 S/P TOTAL KNEE ARTHROPLASTY, LEFT: Primary | ICD-10-CM

## 2021-11-23 DIAGNOSIS — M17.12 PRIMARY OSTEOARTHRITIS OF LEFT KNEE: ICD-10-CM

## 2021-11-23 PROCEDURE — 99024 POSTOP FOLLOW-UP VISIT: CPT | Performed by: ORTHOPAEDIC SURGERY

## 2021-11-23 RX ORDER — OXYCODONE HYDROCHLORIDE AND ACETAMINOPHEN 5; 325 MG/1; MG/1
1 TABLET ORAL EVERY 6 HOURS PRN
Qty: 28 TABLET | Refills: 0 | Status: SHIPPED | OUTPATIENT
Start: 2021-11-23 | End: 2021-11-30

## 2021-11-23 NOTE — PROGRESS NOTES
Post-Operative week: 2 Status Post left Total Knee Arthroplasty, surgery date 11/10/21  Systemic or Specific Complaints:No Complaints    Objective:     General: alert, appears stated age and cooperative   Wound: Wound clean and dry no evidence of infection. , No Erythema, No Edema, No Drainage and Wound Intact   Motion: Flexion: 100 to 0 Degrees   DVT Exam: No evidence of DVT seen on physical exam.  Negative Zina's sign. No cords or calf tenderness. No significant calf/ankle edema. Xrays:  No signs of fracture, there is good alignment, and no signs of aseptic loosening. Radiographic findings reviewed with patient    Assessment:     Encounter Diagnoses   Name Primary?  Primary osteoarthritis of left knee     S/P total knee arthroplasty, left Yes      Doing well postoperatively. Plan:     Continues current post-op course  Continues current post-op course, staples were removed at today's appt  Patient is to continue taking enteric coated aspirin 325 mg, 1 tablet twice daily. Patient is to continue wearing BOBY hose until next follow up visit but wear during the day and remove at night. Outpatient physical therapy is to begin immediately. No bathing/swimming/hot tub for two weeks or until incision is completely closed. We will see the patient back in 3 weeks for repeat xray and evaluation.

## 2021-11-29 ENCOUNTER — EVALUATION (OUTPATIENT)
Dept: PHYSICAL THERAPY | Age: 66
End: 2021-11-29
Payer: COMMERCIAL

## 2021-11-29 DIAGNOSIS — M17.12 PRIMARY OSTEOARTHRITIS OF LEFT KNEE: Primary | ICD-10-CM

## 2021-11-29 PROCEDURE — 97162 PT EVAL MOD COMPLEX 30 MIN: CPT | Performed by: PHYSICAL THERAPIST

## 2021-11-29 NOTE — PROGRESS NOTES
Physical Therapy Daily Treatment Note    Date: 2021  Patient Name: Lia Gutierrez  : 1955   MRN: [id number]  DOInjury: Chronic  DOSx: L TKA 11/10/21  Referring Provider: Lee Ochoa DO  [address] [facility]:      Diagnosis Orders   1. Primary osteoarthritis of left knee         Outcome Measure:  Lower Extremity Functional Scale (LEFS) 45% impairment on eval    S: See eval  O: Mod edema  Time  6334-6283       Visit  1 Repeat outcome measure at mid point and end.     Pain    3/10     ROM  105/111 flex, -6/-3 ext     Modalities       Ice, compression, elevation           Stretching       Patella mobs NEXT     Prone hangs      Heel props      Prone self flexion stretch      Knee flexion stretch on step      Knee flexion stretch supine hip and knee at 90°            Knee flexion stretch on leg press machine      Knee flexion stretch on leg extension machine      Knee extension stretch on leg curl machine            Exercise       Nustep  NEXT     Heel slides HEP     Quad sets HEP     SLR HEP     HS stretch HEP     Knee flex stretch-seated HEP     SAQ HEP     LAQ NEXT     HS in sitting with pillow case            Marching       Standing Hip Abduction      Standing Hip Flexion      Standing Hip Extension      Standing HS Curl      Toe Raises      Squats       Sit to [employer]            Wall Squats      Split Squats      [place] Split Squats      Lunges      Eccentric Heel Tap      Terminal Knee Extension with [place]      SLS            Functional Activity       Step-ups - FWD  NEXT     Step-ups - LAT NEXT     Step-ups - BWD  NEXT     Step up and over reciprocally       TG squats NEXT     TG Calf raises NEXT           Weighted Machines      Leg Press      Toe Raises      Leg Curl       Knee Ext       NMR For safe ambulation in community and home    Marching gait      Side stepping      Retrowalk      Lunges      Lateral Lunges      Heel to toe      A: Tolerated well.  Above added to written HEP along with instructions for ice and elevation.   P: Continue with rehab plan  Francisco Carty, PT    Treatment Charges: Mins Units   Initial Evaluation 40 1   Re-Evaluation     Ther Exercise         TE     Manual Therapy     MT     Ther Activities        TA     Gait Training          GT     Neuro Re-education NR     Modalities     Non-Billable Service Time     Other     Total Time/Units 40 1

## 2021-11-29 NOTE — PROGRESS NOTES
[address] [facility] OUTPATIENT REHABILITATION  PHYSICAL THERAPY INITIAL EVALUATION         Date:  2021   Patient: Arnie Gomez  : 1955  MRN: [id number]  Referring Provider: DO Don Valdez [place]     [facility] Diagnosis:      Diagnosis Orders   1. Primary osteoarthritis of left knee         Physician Order: Eval and Treat     SUBJECTIVE:     Surgical procedure: L TKA    Date of surgery: 11/10/2022    Services provided following surgery: HH PT 1.5 weeks    History: Pt stated that she has had knee issues for 3-4 months until she could not take the pain any longer and required a replacement. Pt had a L knee scope 1 month before L TKA and had no relief. Pt stated that her R knee has given her issues for the past 3-4 years and pt has plans to get a R TKA once her L knee heels and feels better. Chief complaint: pain, decreased motion, decreased mobility, weakness    Pain:   Current: 0/10       Worst: 6/10    Symptom Type / Quality: aching, sharp, throbbing  Location[de-identified] Knee: medial, suprapatellar     Imaging results: XR CHEST (2 VW)    Result Date: 2021  EXAMINATION: TWO XRAY VIEWS OF THE CHEST 2021 12:28 pm COMPARISON: 2020 HISTORY: ORDERING SYSTEM PROVIDED HISTORY: Primary osteoarthritis of left knee TECHNOLOGIST PROVIDED HISTORY: Pre Op Reason for exam:->Pre OP FINDINGS: Calcified granuloma on the left is unchanged. Normal heart and pulmonary vascularity. Neither costophrenic angle is blunted. Stable calcified granuloma on the left. Nothing active. XR KNEE LEFT (1-2 VIEWS)    Result Date: 2021  EXAMINATION: TWO XRAY VIEWS OF THE LEFT KNEE 2021 8:46 am COMPARISON: 10 November 2021 HISTORY: ORDERING SYSTEM PROVIDED HISTORY: Primary osteoarthritis of left knee FINDINGS: Anatomically aligned left TKA with small joint effusion and no osseous findings. Anatomically aligned left TKA with small joint effusion. XR KNEE LEFT (1-2 VIEWS)    Result Date: 11/10/2021  EXAMINATION: TWO XRAY VIEWS OF THE LEFT KNEE 11/10/2021 11:27 am COMPARISON: 25 October 2021 HISTORY: ORDERING SYSTEM PROVIDED HISTORY: Status post total left knee replacement TECHNOLOGIST PROVIDED HISTORY: Reason for exam:->postop FINDINGS: Anatomically aligned recent left TKA with no abnormal bone or soft tissue findings. Anatomically aligned left TKA with no unexpected findings. DEXA BONE DENSITY AXIAL SKELETON    Result Date: 11/5/2021  EXAMINATION: BONE DENSITOMETRY 11/5/2021 9:59 am TECHNIQUE: A bone density DEXA scan was performed of the nondominant forearm, lumbar spine and bilateral hips on a [employer] system. COMPARISON: 08/16/2013 HISTORY: ORDERING SYSTEM PROVIDED HISTORY: Osteoporosis, unspecified osteoporosis type, unspecified pathological fracture presence TECHNOLOGIST PROVIDED HISTORY: Reason for exam:->Osteoporosis, unspecified osteoporosis type, unspecified pathological fracture presence FINDINGS: Lumbar spine bone mineral density BMD: L1-L4 BMD (g/cm2): 1.105, T-score: -0.6, WHO designation: Normal Percent change from comparison: Positive 5.4 % Femoral neck: Left BMD (g/cm2): 0.953, T-score: -0.6, WHO designation: Normal Right BMD (g/cm2): 0.909, T-score: -0.9, WHO designation: Normal Hip total: Left BMD (g/cm2): 1.010, T-score: 0.0, WHO designation: Normal Right BMD (g/cm2): 0.986, T-score: -0.2, WHO designation: Normal Bilateral femur total mean percent change from comparison: -3.8 % Radius 33 %: Right Bone mineral density (g/cm2): 0.971, T-score: 0.9, WHO designation: Normal Percent change from comparison: Not available     Bone densities are within normal limits. Compared to 2013, bone density demonstrates interval increase in the lumbar spine but decrease in the hips.        Past Medical History:  Past Medical History:   Diagnosis Date    Arthritis     back, cervical neck    Asthma     Breast mass     fibercystic     Chronic sinusitis     Colon polyps     COPD (chronic obstructive pulmonary disease) ([facility])     controlled with inhalers    Depression     Diabetes mellitus ([address]     Diabetic polyneuropathy (HCC)     Diverticulitis     Diverticulosis     GERD (gastroesophageal reflux disease)     Hyperlipidemia     Hypertension     Mitral valve prolapse     f/u w/ PCP only - no symptoms    Neuropathy     Overactive thyroid gland     Shortness of breath     Sleep apnea     Thyroid disease      Past Surgical History:   Procedure Laterality Date    ABDOMINAL EXPLORATION SURGERY      APPENDECTOMY  1968    BRONCHOSCOPY  Oct 2013    Bronchoscopy and Mediastinoscopy    CARPAL TUNNEL RELEASE      bilat    CHOLECYSTECTOMY, LAPAROSCOPIC N/A 5/12/2020    CHOLECYSTECTOMY LAPAROSCOPIC WITH IOC performed by Moniqeu Martinez MD at [facility] ARTHROSCOPY Left 8/4/2021    LEFT KNEE ARTHROSCOPY, MEDIAL MENISCECTOMY AND DEBRIDEMENT performed by Lizbeth Dexter DO at  [address]  10/1995    left    TONSILLECTOMY  1972    TOTAL KNEE ARTHROPLASTY Left 11/10/2021    LEFT KNEE TOTAL ARTHROPLASTY [facility]) performed by Lizbeth Dexter DO at [address] N/A 1/23/2020    EGD BIOPSY performed by Monique Martinez MD at [facility] ENDOSCOPY       Medications:   Current Outpatient Medications   Medication Sig Dispense Refill    oxyCODONE-acetaminophen (PERCOCET) 5-325 MG per tablet Take 1 tablet by mouth every 6 hours as needed for Pain for up to 7 days. Intended supply: 7 days.  Take lowest dose possible to manage pain 28 tablet 0    aspirin 325 MG EC tablet Take 1 tablet by mouth 2 times daily for 28 days 56 tablet 0    Cholecalciferol (VITAMIN D3) 1.25 MG (92447 UT) CAPS take 1 capsule by mouth THREE TIMES A WEEK      RA VITAMIN B-12 100 MCG tablet take 1 tablet by mouth once daily (Patient not taking: Reported on 10/25/2021)      ondansetron (ZOFRAN-ODT) 4 MG disintegrating tablet Take 1 tablet by mouth every 8 hours as needed for Nausea or Vomiting 20 tablet 1    budesonide-formoterol (SYMBICORT) 160-4.5 MCG/ACT AERO Inhale 2 puffs into the lungs 2 times daily      Multiple Vitamins-Minerals (THERAPEUTIC MULTIVITAMIN-MINERALS) tablet Take 1 tablet by mouth daily  (Patient not taking: Reported on 10/25/2021)      buPROPion (WELLBUTRIN XL) 150 MG extended release tablet Take 150 mg by mouth every morning      montelukast (SINGULAIR) 10 MG tablet nightly       ferrous sulfate 325 (65 Fe) MG tablet Take 1 tablet by mouth daily (with breakfast) Take with your multi vitamin daily. (Patient not taking: Reported on 10/25/2021) 30 tablet 1    vitamin D (ERGOCALCIFEROL) 1.25 MG (92949 UT) CAPS capsule Take 1 capsule by mouth Twice a Week 24 capsule 0    lisinopril-hydrochlorothiazide (PRINZIDE;ZESTORETIC) 20-12.5 MG per tablet Take 1 tablet by mouth daily      DULoxetine (CYMBALTA) 60 MG extended release capsule Take 60 mg by mouth daily      cetirizine (ZYRTEC) 10 MG tablet Take 10 mg by mouth daily      fluticasone (FLONASE) 50 MCG/ACT nasal spray 1 spray by Nasal route daily 1 Bottle 3    meloxicam (MOBIC) 7.5 MG tablet Take 7.5 mg by mouth daily       oxybutynin (DITROPAN) 5 MG tablet Take 5 mg by mouth 3 times daily       ezetimibe (ZETIA) 10 MG tablet Take 10 mg by mouth daily      omeprazole (PRILOSEC) 20 MG capsule Take 20 mg by mouth daily.  lovastatin (MEVACOR) 40 MG tablet Take 40 mg by mouth nightly.  metoprolol (LOPRESSOR) 25 MG tablet Take 25 mg by mouth nightly.  metFORMIN (GLUCOPHAGE) 500 MG tablet Take 500 mg by mouth daily (with breakfast)       levothyroxine (SYNTHROID) 50 MCG tablet Take 75 mcg by mouth Daily       gabapentin (NEURONTIN) 600 MG tablet Take 600 mg by mouth 3 times daily.  sertraline (ZOLOFT) 100 MG tablet Take 100 mg by mouth nightly.  Takes 1and 1/2 tablets nightly      albuterol (PROVENTIL HFA;VENTOLIN HFA) 108 (90 BASE) MCG/ACT inhaler Inhale 2 puffs into the lungs every 6 hours as needed. No current facility-administered medications for this visit. Occupation: retired. Physical demands include: n/a. Status: n/a. Exercise regimen: none    Hobbies: working around the house, play with grand daughter, ride her bike     Patient Goals: pain relief, return to prior activity, get back to normal    Precautions/Contraindications: recent surgery    OBJECTIVE:     Estimated body mass index is 37.41 kg/m² as calculated from the following:    Height as of 11/23/21: 5' 1\" (1.549 m). Weight as of 11/23/21: 198 lb (89.8 kg). Observations: Well nourished female with normal affect. Rises carefully from chair. Ambulates with rollator. Inspection: Incision edges approximated, no purulent drainage, no redness, no swelling, no tenderness and not hot to touch. Edema: moderate global    Gait: antalgic gait, unsteady, ambulates with rollator    Joint/Motion:    Knee:  Right:   AROM: WNL° Flexion,  WNL° Extension  PROM: WNL° Flexion,  WNL° Extension  Left:   AROM: 105° Flexion,  -6° Extension  PROM: 111° Flexion,  -3° Extension    Strength:    Knee:   Right: Flexion 5/5,  Extension 5/5  Left: Flexion 4-/5,  Extension 4-/5    Palpation: Tender to palpation medial joint line. ASSESSMENT     Pt to benefit from skilled PT services in order to improve ROM, strength, functional mobility, endurance, and decrease pain in L knee. Pt demonstrated above average ROM with flexion and extension with diminished strength upon testing. Pt to receive stretches to normalize end range mobility and strengthening exercises to improve upon weakness and normalize functional mobility.      Outcome Measure:   Lower Extremity Functional Scale (LEFS) 45% impairment    Problems:   Pain 6/10 intermittent  Edema moderate  ROM decreased  Strength decreased   Balance decreased in both standing and walking   Limitations with walking, stairs, ADLs , use of left lower extremity, bending, lifting      [x] There are no barriers affecting plan of care or recovery    [] Barriers to this patient's plan of care or recovery include:    Domestic Concerns:  [x] No  [] Yes:    Short Term goals (2-3 weeks)   Pain 4/10   ROM [facility]   Strength 4/5 L knee    Able to perform / complete the following functions / tasks: ADLs, hobbies, yard work, reach / lift / carry light weighted items in performance of home or work demands, tolerate weightbearing activities for 2 - 4 hours, return to exercise regimen  with less pain.  Lower Extremity Functional Scale (LEFS) 35% impairment    Long Term goals (4-6 weeks)   Pain 0-3/10   ROM WNL   Strength 4+/5 L knee   Able to perform / complete the following functions / tasks: ADLs, normal gait, normal stair negotiation , hobbies, yard work, reach / lift / carry medium weighted items in performance of home or work demands, tolerate weightbearing activities for 6 - 8 hours, return to exercise regimen  with no/minimal pain.  LEFS 0-25% impairment   Independent with home exercise program (HEP)    Rehab Potential: [x] Good  [] Fair  [] Poor    PLAN       Treatment Plan:  instruction in home exercise program   therapeutic exercise   therapeutic activity   gait training   balance training   vasocompression     The following CPT codes are likely to be used in the care of this patient:   47707 PT Evaluation: Moderate Complexity   65356 PT Re-Evaluation   52679 Therapeutic Exercise   55487 Therapeutic Activities   86468 Gait Training   75777 Vasopneumatic Device     **Requesting 54 units of Therapeutic Activity and Therapeutic Exercise, 18 units of Gait Training and Vaso Device.      Suggested Professional Referral: [x] No  [] Yes:     Patient Education:  [x] Plans / Goals, Risks / Benefits discussed  [x] Home exercise program  Method of Education: [x] Verbal  [x] Demo  [x] Written  Comprehension of Education:  [x] Vernon Nieto understanding. [x] Demonstrates understanding. [] Needs Review. [] Demonstrates / verbalizes understanding of HEP / Adrián Izabella previously given. Frequency:  1-3 days per week for 4-6 weeks    Patient understands diagnosis/prognosis and consents to treatment, plan and goals: [x] Yes    [] No     Thank you for the opportunity to work with your patient. If you have questions or comments, please contact me at [phone]; fax: [phone]. Electronically signed by: Angelina Lopez PT         Please sign Physician's Certification and return to: [address] [place] [address]  Dept: [phone]  Dept Fax: [phone] Certification / Comments     Frequency/Duration 1-3 days per week for 4-6 weeks. Certification period from 11/29/2021  to 02/28/2022. I have reviewed the Plan of Care established for skilled therapy services and certify that the services are required and that they will be provided while the patient is under my care.     Physician's Comments/Revisions:               Physician's Printed Name:                                           [de-identified] Signature:                                                               Date:

## 2021-12-01 ENCOUNTER — TREATMENT (OUTPATIENT)
Dept: PHYSICAL THERAPY | Age: 66
End: 2021-12-01
Payer: COMMERCIAL

## 2021-12-01 DIAGNOSIS — M17.12 PRIMARY OSTEOARTHRITIS OF LEFT KNEE: Primary | ICD-10-CM

## 2021-12-01 PROCEDURE — 97110 THERAPEUTIC EXERCISES: CPT

## 2021-12-01 NOTE — PROGRESS NOTES
Physical Therapy Daily Treatment Note    Date: 2021  Patient Name: Sasha Hancock  : 1955   MRN: [id number]  DOInjury: Chronic  DOSx: L TKA 11/10/21    Referring Provider:   Jamey Pacheco DO  [address] [facility]          Medical Diagnosis:    Diagnosis Orders   1. Primary osteoarthritis of left knee         Outcome Measure:  Lower Extremity Functional Scale (LEFS) 45% impairment on eval    S: Patient reports earlier this morning she leaned down to grab something and felt a pop on the side of her knee. Hurt directly after but then felt better with improvements in mobility as well. O: Mod edema  Time  2473-5361       Visit  2 Repeat outcome measure at mid point and end.     Pain    3/10     ROM  105/115 flex, -6/-3 ext 2021    Modalities       Ice, compression, elevation           Stretching       Patella mobs 30 reps  TE   Prone hangs      Heel props 4 min  TE   Prone self flexion stretch      Knee flexion stretch on step      Knee flexion stretch supine hip and knee at 90°            Knee flexion stretch on leg press machine      Knee flexion stretch on leg extension machine      Knee extension stretch on leg curl machine            Exercise       Nustep  L5 10 min  TE   Heel slides 3 x 10 HEP TE   Quad sets 30 reps 5 sec hold  TE   SLR 2 x 10  TE   HS stretch HEP  TE   Knee flex stretch-seated 3 x 30 sec  TE   SAQ HEP  TE   LAQ NEXT  TE   HS in sitting with pillow case            Marching       Standing Hip Abduction      Standing Hip Flexion      Standing Hip Extension      Standing HS Curl      Toe Raises      Squats       Sit to [employer]            Wall Squats      Split Squats      [facility] Split Squats      Lung      Eccentric Heel Tap      Terminal Knee Extension with [place]      SLS            Functional Activity       Step-ups - FWD  NEXT     Step-ups - LAT NEXT     Step-ups - BWD  NEXT     Step up and over reciprocally       TG squats L18 3 x 10  TE   TG Calf raises NEXT  TE         Weighted Machines      Leg Press      Toe Raises      Leg Curl       Knee Ext       NMR For safe ambulation in community and home    Marching gait      Side stepping      Retrowalk      Lunges      Lateral Lunges      Heel to toe      A: Tolerated well. Above added to written HEP along with instructions for ice and elevation.   P: Continue with rehab plan  Steven Mccarthy PTA    Treatment Charges: Mins Units   Initial Evaluation     Re-Evaluation     Ther Exercise         TE 40 3   Manual Therapy     MT     Ther Activities        TA     Gait Training          GT     Neuro Re-education NR     Modalities     Non-Billable Service Time     Other     Total Time/Units 40 3

## 2021-12-10 ENCOUNTER — TREATMENT (OUTPATIENT)
Dept: PHYSICAL THERAPY | Age: 66
End: 2021-12-10
Payer: COMMERCIAL

## 2021-12-10 DIAGNOSIS — M17.12 PRIMARY OSTEOARTHRITIS OF LEFT KNEE: Primary | ICD-10-CM

## 2021-12-10 PROCEDURE — 97110 THERAPEUTIC EXERCISES: CPT

## 2021-12-10 NOTE — PROGRESS NOTES
Physical Therapy Daily Treatment Note    Date: 12/10/2021  Patient Name: Erika Chung  : 1955   MRN: [id number]  DOInjury: Chronic  DOSx: L TKA 11/10/21    Referring Provider:   Radha Diaz DO  [address] [facility]          Medical Diagnosis:    Diagnosis Orders   1. Primary osteoarthritis of left knee         Outcome Measure:  Lower Extremity Functional Scale (LEFS) 45% impairment on eval    S: Patient reports feeling good and able to ambulate without walker. O: Mod edema  Time  0856-7968      Visit  3 Repeat outcome measure at mid point and end.     Pain    1/10     ROM  Flex: 115 AROM, 120 PROM Ext: -3 AROM 2021    Modalities       Ice, compression, elevation           Stretching       Patella mobs 30 reps  TE   Prone hangs      Heel props 3# 5 min  TE   Prone self flexion stretch      Knee flexion stretch on step      Knee flexion stretch supine hip and knee at 90°            Knee flexion stretch on leg press machine      Knee flexion stretch on leg extension machine      Knee extension stretch on leg curl machine            Exercise       Nustep  L5 10 min  TE   Heel slides HEP TE   Quad sets  TE   SLR  TE   HS stretch HEP  TE   Knee flex stretch-seated 3 x 1 min hold  TE   SAQ HEP  TE   LAQ #3 2 x 15  TE   HS in sitting with pillow case            Marching       Standing Hip Abduction      Standing Hip Flexion      Standing Hip Extension      Standing HS Curl      Toe Raises      Squats       Sit to [employer]            Wall Squats      Split Squats      [facility] Split Squats      Lunges      Eccentric Heel Tap      Terminal Knee Extension with [place]      SLS            Functional Activity       Step-ups - FWD  25 reps     Step-ups - LAT 25 reps     Step-ups - BWD  15 reps     Step up and over reciprocally       TG squats L18 3 x 10  TE   TG Calf raises   TE         Weighted Machines      Leg Press      Toe Raises      Leg Curl       Knee Ext       NMR For safe ambulation in community and home    Marching gait      Side stepping      Retrowalk      Lunges      Lateral Lunges      Heel to toe      A: Tolerated well with improvements in motion. Above added to written HEP along with instructions for ice and elevation.   P: Continue with rehab plan  Marylen Pries, PTA    Treatment Charges: Mins Units   Initial Evaluation     Re-Evaluation     Ther Exercise         TE 40 3   Manual Therapy     MT     Ther Activities        TA     Gait Training          GT     Neuro Re-education NR     Modalities     Non-Billable Service Time     Other     Total Time/Units 40 3

## 2021-12-16 ENCOUNTER — TREATMENT (OUTPATIENT)
Dept: PHYSICAL THERAPY | Age: 66
End: 2021-12-16
Payer: COMMERCIAL

## 2021-12-16 ENCOUNTER — OFFICE VISIT (OUTPATIENT)
Dept: ORTHOPEDIC SURGERY | Age: 66
End: 2021-12-16

## 2021-12-16 VITALS — WEIGHT: 198 LBS | HEIGHT: 61 IN | BODY MASS INDEX: 37.38 KG/M2

## 2021-12-16 DIAGNOSIS — Z96.652 S/P TOTAL KNEE ARTHROPLASTY, LEFT: Primary | ICD-10-CM

## 2021-12-16 DIAGNOSIS — M17.12 PRIMARY OSTEOARTHRITIS OF LEFT KNEE: Primary | ICD-10-CM

## 2021-12-16 PROCEDURE — 99024 POSTOP FOLLOW-UP VISIT: CPT | Performed by: NURSE PRACTITIONER

## 2021-12-16 PROCEDURE — 97110 THERAPEUTIC EXERCISES: CPT

## 2021-12-16 NOTE — PROGRESS NOTES
Physical Therapy Daily Treatment Note    Date: 2021  Patient Name: Baljit Santmaaria  : 1955   MRN: [id number]  DOInjury: Chronic  DOSx: L TKA 11/10/21    Referring Provider:   Jenelle Hess DO  [address]  [facility]          Medical Diagnosis:    Diagnosis Orders   1. Primary osteoarthritis of left knee         Outcome Measure:  Lower Extremity Functional Scale (LEFS) 45% impairment on eval    S: Patient still dealing with swelling where it gets sore from that. Encouraged increasing ice, elevation and rest. She also still struggles with lifting leg into car, bed etc but getting better. O: Mod edema  Time  4815-8749      Visit  3 Repeat outcome measure at mid point and end.     Pain    1/10     ROM  Flex: 125 AROM   Ext: -1 AROM 2021    Modalities       Ice, compression, elevation           Stretching       Patella mobs 30 reps  TE   Prone hangs      Heel props 3# 5 min  TE   Prone self flexion stretch      Knee flexion stretch on step      Knee flexion stretch supine hip and knee at 90°            Knee flexion stretch on leg press machine      Knee flexion stretch on leg extension machine      Knee extension stretch on leg curl machine            Exercise       Nustep  L5 10 min  TE   Heel slides HEP TE   Quad sets  TE   SLR  TE   HS stretch HEP  TE   Knee flex stretch-seated 3 x 1 min hold  TE   SAQ HEP  TE   LAQ #3 2 x 15  TE   HS in sitting with pillow case            Marching       Standing Hip Abduction      Standing Hip Flexion      Standing Hip Extension      Standing HS Curl      Toe Raises      Squats       Sit to [employer]            Wall Squats      Split Squats      [facility] Split Squats      Lunges      Eccentric Heel Tap      Terminal Knee Extension with [place]      SLS            Functional Activity       Step-ups - FWD  7\" 20 reps     Step-ups - LAT 7\" 20 reps     Step-ups - BWD  6\" 20 reps     Step up and over reciprocally       TG squats L18 3 x 10  TE   TG Calf raises   TE         Weighted Machines      Leg Press      Toe Raises      Leg Curl       Knee Ext       NMR For safe ambulation in community and home    Marching gait      Side stepping      Retrowalk      Lunges      Lateral Lunges      Heel to toe      A: Tolerated well with improvements in motion. Above added to written HEP along with instructions for ice and elevation. P: Today was patient's last session and they are to continue with HEP. Provided updated HEP.   Frances Modi, PTA    Treatment Charges: Mins Units   Initial Evaluation     Re-Evaluation     Ther Exercise         TE 40 3   Manual Therapy     MT     Ther Activities        TA     Gait Training          GT     Neuro Re-education NR     Modalities     Non-Billable Service Time     Other     Total Time/Units 40 3

## 2021-12-16 NOTE — PROGRESS NOTES
Post-Operative week: 6 Status Post left Total Knee Arthroplasty, DOS: 11/10/2021  Systemic or Specific Complaints:No Complaints    Objective:     General: alert, appears stated age and cooperative   Wound: Wound clean and dry no evidence of infection. , No Erythema, No Edema and No Drainage   Motion: Flexion: 0 to 130 Degrees   DVT Exam: No evidence of DVT seen on physical exam.  Negative Zina's sign. No cords or calf tenderness. No significant calf/ankle edema. Xrays:  N/A      Assessment:     Encounter Diagnosis   Name Primary?  S/P total knee arthroplasty, left Yes      Doing well postoperatively. Plan:     Continues current post-op course  Patient is to discontinue taking enteric coated aspirin 325mg. Patient is to discontinue wearing BOBY hose. Continue with outpatient physical therapy. Follow up 2 months.

## 2022-02-11 DIAGNOSIS — Z96.652 S/P TOTAL KNEE ARTHROPLASTY, LEFT: Primary | ICD-10-CM

## 2022-02-15 ENCOUNTER — OFFICE VISIT (OUTPATIENT)
Dept: ORTHOPEDIC SURGERY | Age: 67
End: 2022-02-15
Payer: COMMERCIAL

## 2022-02-15 VITALS — TEMPERATURE: 98 F | HEIGHT: 61 IN | WEIGHT: 198 LBS | BODY MASS INDEX: 37.38 KG/M2

## 2022-02-15 DIAGNOSIS — Z96.652 S/P TOTAL KNEE ARTHROPLASTY, LEFT: Primary | ICD-10-CM

## 2022-02-15 PROCEDURE — 1036F TOBACCO NON-USER: CPT | Performed by: ORTHOPAEDIC SURGERY

## 2022-02-15 PROCEDURE — G8417 CALC BMI ABV UP PARAM F/U: HCPCS | Performed by: ORTHOPAEDIC SURGERY

## 2022-02-15 PROCEDURE — 3017F COLORECTAL CA SCREEN DOC REV: CPT | Performed by: ORTHOPAEDIC SURGERY

## 2022-02-15 PROCEDURE — G8399 PT W/DXA RESULTS DOCUMENT: HCPCS | Performed by: ORTHOPAEDIC SURGERY

## 2022-02-15 PROCEDURE — G8427 DOCREV CUR MEDS BY ELIG CLIN: HCPCS | Performed by: ORTHOPAEDIC SURGERY

## 2022-02-15 PROCEDURE — 4040F PNEUMOC VAC/ADMIN/RCVD: CPT | Performed by: ORTHOPAEDIC SURGERY

## 2022-02-15 PROCEDURE — 1090F PRES/ABSN URINE INCON ASSESS: CPT | Performed by: ORTHOPAEDIC SURGERY

## 2022-02-15 PROCEDURE — 1123F ACP DISCUSS/DSCN MKR DOCD: CPT | Performed by: ORTHOPAEDIC SURGERY

## 2022-02-15 PROCEDURE — G8484 FLU IMMUNIZE NO ADMIN: HCPCS | Performed by: ORTHOPAEDIC SURGERY

## 2022-02-15 PROCEDURE — 99213 OFFICE O/P EST LOW 20 MIN: CPT | Performed by: ORTHOPAEDIC SURGERY

## 2022-02-15 NOTE — PROGRESS NOTES
Chief Complaint   Patient presents with    Knee Pain     Left TKA DOS 11/10/2021       Kp Lea returns today for follow-up of her left TKA. DOS: 11/10/2021. She reports this is better than when I saw the patient last.  She reports she fell last night on the ice.      Past Medical History:   Diagnosis Date    Arthritis     back, cervical neck    Asthma     Breast mass     fibercystic     Chronic sinusitis     Colon polyps     COPD (chronic obstructive pulmonary disease) (HCC)     controlled with inhalers    Depression     Diabetes mellitus ([address]     Diabetic polyneuropathy (HCC)     Diverticulitis     Diverticulosis     GERD (gastroesophageal reflux disease)     Hyperlipidemia     Hypertension     Mitral valve prolapse     f/u w/ PCP only - no symptoms    Neuropathy     Overactive thyroid gland     Shortness of breath     Sleep apnea     Thyroid disease      Past Surgical History:   Procedure Laterality Date    ABDOMINAL EXPLORATION SURGERY      APPENDECTOMY  1968    BRONCHOSCOPY  Oct 2013    Bronchoscopy and Mediastinoscopy    CARPAL TUNNEL RELEASE      bilat    CHOLECYSTECTOMY, LAPAROSCOPIC N/A 5/12/2020    CHOLECYSTECTOMY LAPAROSCOPIC WITH IOC performed by Brodie Mejía MD at [facility] ARTHROSCOPY Left 8/4/2021    LEFT KNEE ARTHROSCOPY, MEDIAL MENISCECTOMY AND DEBRIDEMENT performed by Francia Knowles DO at [address] [place] [address]  10/1995    left    TONSILLECTOMY  1972    TOTAL KNEE ARTHROPLASTY Left 11/10/2021    LEFT KNEE TOTAL ARTHROPLASTY [facility]) performed by Francia Knowles DO at [facility] N/A 1/23/2020    EGD BIOPSY performed by Brodie Mejía MD at [place] [address]       Current Outpatient Medications:     Cholecalciferol (VITAMIN D3) 1.25 MG (84262 UT) CAPS, take 1 capsule by mouth THREE TIMES A WEEK, Disp: , Rfl:     RA VITAMIN B-12 100 MCG tablet, take 1 tablet by mouth once daily, Disp: , Rfl:   ondansetron (ZOFRAN-ODT) 4 MG disintegrating tablet, Take 1 tablet by mouth every 8 hours as needed for Nausea or Vomiting, Disp: 20 tablet, Rfl: 1    budesonide-formoterol (SYMBICORT) 160-4.5 MCG/ACT AERO, Inhale 2 puffs into the lungs 2 times daily, Disp: , Rfl:     Multiple Vitamins-Minerals (THERAPEUTIC MULTIVITAMIN-MINERALS) tablet, Take 1 tablet by mouth daily , Disp: , Rfl:     buPROPion (WELLBUTRIN XL) 150 MG extended release tablet, Take 150 mg by mouth every morning, Disp: , Rfl:     montelukast (SINGULAIR) 10 MG tablet, nightly , Disp: , Rfl:     ferrous sulfate 325 (65 Fe) MG tablet, Take 1 tablet by mouth daily (with breakfast) Take with your multi vitamin daily. , Disp: 30 tablet, Rfl: 1    vitamin D (ERGOCALCIFEROL) 1.25 MG (67723 UT) CAPS capsule, Take 1 capsule by mouth Twice a Week, Disp: 24 capsule, Rfl: 0    lisinopril-hydrochlorothiazide (PRINZIDE;ZESTORETIC) 20-12.5 MG per tablet, Take 1 tablet by mouth daily, Disp: , Rfl:     DULoxetine (CYMBALTA) 60 MG extended release capsule, Take 60 mg by mouth daily, Disp: , Rfl:     cetirizine (ZYRTEC) 10 MG tablet, Take 10 mg by mouth daily, Disp: , Rfl:     fluticasone (FLONASE) 50 MCG/ACT nasal spray, 1 spray by Nasal route daily, Disp: 1 Bottle, Rfl: 3    meloxicam (MOBIC) 7.5 MG tablet, Take 7.5 mg by mouth daily , Disp: , Rfl:     oxybutynin (DITROPAN) 5 MG tablet, Take 5 mg by mouth 3 times daily , Disp: , Rfl:     ezetimibe (ZETIA) 10 MG tablet, Take 10 mg by mouth daily, Disp: , Rfl:     omeprazole (PRILOSEC) 20 MG capsule, Take 20 mg by mouth daily. , Disp: , Rfl:     lovastatin (MEVACOR) 40 MG tablet, Take 40 mg by mouth nightly., Disp: , Rfl:     metoprolol (LOPRESSOR) 25 MG tablet, Take 25 mg by mouth nightly., Disp: , Rfl:     metFORMIN (GLUCOPHAGE) 500 MG tablet, Take 500 mg by mouth daily (with breakfast) , Disp: , Rfl:     levothyroxine (SYNTHROID) 50 MCG tablet, Take 75 mcg by mouth Daily , Disp: , Rfl:    gabapentin (NEURONTIN) 600 MG tablet, Take 600 mg by mouth 3 times daily. , Disp: , Rfl:     sertraline (ZOLOFT) 100 MG tablet, Take 100 mg by mouth nightly. Takes 1and 1/2 tablets nightly, Disp: , Rfl:     albuterol (PROVENTIL HFA;VENTOLIN HFA) 108 (90 BASE) MCG/ACT inhaler, Inhale 2 puffs into the lungs every 6 hours as needed. , Disp: , Rfl:     aspirin 325 MG EC tablet, Take 1 tablet by mouth 2 times daily for 28 days, Disp: 56 tablet, Rfl: 0  Allergies   Allergen Reactions    Other      Enviromental      Codeine Nausea And Vomiting     itching     Social History     Socioeconomic History    Marital status:      Spouse name: Not on file    Number of children: Not on file    Years of education: Not on file    Highest education level: Not on file   Occupational History    Not on file   Tobacco Use    Smoking status: Former Smoker     Packs/day: 4.00     Years: 45.00     Pack years: 180.00     Types: Cigarettes     Quit date: 2013     Years since quittin.4    Smokeless tobacco: Never Used    Tobacco comment: Uses E-cig   Vaping Use    Vaping Use: Every day    Last attempt to quit: 2020   Substance and Sexual Activity    Alcohol use: No     Comment: 8 cans pop daily    Drug use: No    Sexual activity: Yes   Other Topics Concern    Not on file   Social History Narrative    Not on file     Social Determinants of Health     Financial Resource Strain:     Difficulty of Paying Living Expenses: Not on file   Food Insecurity:     Worried About Running Out of Food in the Last Year: Not on file    Wild of Food in the Last Year: Not on file   Transportation Needs:     Lack of Transportation (Medical): Not on file    Lack of Transportation (Non-Medical):  Not on file   Physical Activity:     Days of Exercise per Week: Not on file    Minutes of Exercise per Session: Not on file   Stress:     Feeling of Stress : Not on file   Social Connections:     Frequency of Communication with Friends and Family: Not on file    Frequency of Social Gatherings with Friends and Family: Not on file    Attends Episcopal Services: Not on file    Active Member of Clubs or Organizations: Not on file    Attends Club or Organization Meetings: Not on file    Marital Status: Not on file   Intimate Partner Violence:     Fear of Current or Ex-Partner: Not on file    Emotionally Abused: Not on file    Physically Abused: Not on file    Sexually Abused: Not on file   Housing Stability:     Unable to Pay for Housing in the Last Year: Not on file    Number of [place] in the Last Year: Not on file    Unstable Housing in the Last Year: Not on file       Review of Systems:     Skin: (-) rash,(-) psoriasis,(-) eczema, (-)skin cancer. Musculoskeletal: (-) fractures,  (-) dislocations,(-) collagen vascular disease, (-) fibromyalgia, (-) multiple sclerosis, (-) muscular dystrophy, (-) RSD,(-) joint pain (-)swelling, (-) joint pain,swelling. Neurologic: (-) epilepsy, (-)seizures,(-) brain tumor,(-) TIA, (-)stroke, (-)headaches, (-)Parkinson disease,(-) memory loss, (-) LOC. Cardiovascular: (-) Chest pain, (-) swelling in legs/feet, (-) SOB, (-) cramping in legs/feet with walking. Subjective:    Constitutional:    The patient is alert and oriented x 3, appears to be stated age and in no distress. Temp 98 °F (36.7 °C)   Ht 5' 1\" (1.549 m)   Wt 198 lb (89.8 kg)   LMP 01/01/1993   BMI 37.41 kg/m²     Skin:  Upon inspection: the skin appears warm, dry and intact. There is a previous scar over the affected area. There is not any cellulitis, lymphedema or cutaneous lesions noted in the lower extremities. Upon palpation there is no induration noted. Neurologic:  Gait: normal;  Motor exam of the lower extremities show ; quadriceps, hamstrings, foot dorsi and plantar flexors intact R.  5/5 and L. 5/5.  Deep tendon reflexes are 2/4 at the knees and 2/4 at the ankles with strong extensor hallicus longus motor strength bilaterally. Sensory to both feet is intact to all sensory roots. Cardiovascular: The vascular exam is normal and is well perfused to distal extremities. Distal pulses DP/PT: R. 2+; L. 2+. There is cap refill noted less than two seconds in all digits. There is not edema of the bilateral lower extremities. There is not varicosities noted in the distal extremities. Lymph:  Upon palpation,  there is no lymphadenopathy noted in bilateral lower extremities. Musculoskeletal:    Lumbar exam:  On visual inspection, there is not deformity of the spine. full range of motion, no tenderness, palpable spasm or pain on motion. Special tests: Straight Leg Raise negative, Sunday testnegative. Hip exam:  Upon inspection, there is not deformity noted. Upon palpation there is not tenderness. ROM: is   full and symmetrical.   Strength: Hip Flexors 5/5; Hip Abductors 5/5; Hip Adduction 5/5. Knee exam:  Left knee exam shows;  range of motion of R. Knee is 0 to 120, and L. Knee is 0 to 120. The patient does not have  pain on motion, effusion is mild, there is not tenderness over the  medial region, there are not any masses, there is not ligamentous instability, there is not  deformity noted. Knee exam: neither positive for moderate crepitations, some mild tenderness laxity is not noted with  stress. R. Knee:  Lachman's negative, Anterior Drawer negative, Posterior Drawer negative  Marysol's negative, Thallasy  negative,   PF grind test negative, Apprehension test negative, Patellar J sign  negative  L. Knee:  Lachman's negative, Anterior Drawer negative, Posterior Drawer negative  Marysol's negative, Thallasy  negative,   PF grind test negative, Apprehension test negative,  Patellar J sign  Negative    Xrays: left TKA well aligned with no signs of aseptic loosening    Radiographic findings reviewed with patient    Impression:   Encounter Diagnosis   Name Primary?     S/P total knee arthroplasty, left Yes       Plan:  Hep  Activity as tolerated  Discussed no loosening to prosthesis  Fu in 3 months with xr

## 2022-05-17 ENCOUNTER — OFFICE VISIT (OUTPATIENT)
Dept: ORTHOPEDIC SURGERY | Age: 67
End: 2022-05-17
Payer: COMMERCIAL

## 2022-05-17 VITALS — HEIGHT: 61 IN | WEIGHT: 210 LBS | BODY MASS INDEX: 39.65 KG/M2 | TEMPERATURE: 98 F

## 2022-05-17 DIAGNOSIS — Z96.652 S/P TOTAL KNEE ARTHROPLASTY, LEFT: Primary | ICD-10-CM

## 2022-05-17 PROCEDURE — 99213 OFFICE O/P EST LOW 20 MIN: CPT | Performed by: ORTHOPAEDIC SURGERY

## 2022-05-17 PROCEDURE — G8427 DOCREV CUR MEDS BY ELIG CLIN: HCPCS | Performed by: ORTHOPAEDIC SURGERY

## 2022-05-17 PROCEDURE — 1036F TOBACCO NON-USER: CPT | Performed by: ORTHOPAEDIC SURGERY

## 2022-05-17 PROCEDURE — 1123F ACP DISCUSS/DSCN MKR DOCD: CPT | Performed by: ORTHOPAEDIC SURGERY

## 2022-05-17 PROCEDURE — G8417 CALC BMI ABV UP PARAM F/U: HCPCS | Performed by: ORTHOPAEDIC SURGERY

## 2022-05-17 PROCEDURE — 4040F PNEUMOC VAC/ADMIN/RCVD: CPT | Performed by: ORTHOPAEDIC SURGERY

## 2022-05-17 PROCEDURE — 1090F PRES/ABSN URINE INCON ASSESS: CPT | Performed by: ORTHOPAEDIC SURGERY

## 2022-05-17 PROCEDURE — 3017F COLORECTAL CA SCREEN DOC REV: CPT | Performed by: ORTHOPAEDIC SURGERY

## 2022-05-17 PROCEDURE — G8399 PT W/DXA RESULTS DOCUMENT: HCPCS | Performed by: ORTHOPAEDIC SURGERY

## 2022-05-17 NOTE — PROGRESS NOTES
Chief Complaint   Patient presents with    Knee Pain     f/u left TKA DOS:11/10/21. Patient would like to discuss thr right knee pain as well. George Strickland returns today for follow-up of her left TKA. DOS: 11/10/2021. She reports this is better than when I saw the patient last.  Steps continue to bother her.      Past Medical History:   Diagnosis Date    Arthritis     back, cervical neck    Asthma     Breast mass     fibercystic     Chronic sinusitis     Colon polyps     COPD (chronic obstructive pulmonary disease) ([facility])     controlled with inhalers    Depression     Diabetes mellitus ([address]     Diabetic polyneuropathy ([facility])     Diverticulitis     Diverticulosis     GERD (gastroesophageal reflux disease)     Hyperlipidemia     Hypertension     Mitral valve prolapse     f/u w/ PCP only - no symptoms    Neuropathy     Overactive thyroid gland     Shortness of breath     Sleep apnea     Thyroid disease      Past Surgical History:   Procedure Laterality Date    ABDOMINAL EXPLORATION SURGERY      APPENDECTOMY  1968    BRONCHOSCOPY  Oct 2013    Bronchoscopy and Mediastinoscopy    CARPAL TUNNEL RELEASE      bilat    CHOLECYSTECTOMY, LAPAROSCOPIC N/A 5/12/2020    CHOLECYSTECTOMY LAPAROSCOPIC WITH IOC performed by Jerica Lin MD at [facility] ARTHROSCOPY Left 8/4/2021    LEFT KNEE ARTHROSCOPY, MEDIAL MENISCECTOMY AND DEBRIDEMENT performed by Bolivar Gil DO at [address] [place] [address]  10/1995    left    TONSILLECTOMY  1972    TOTAL KNEE ARTHROPLASTY Left 11/10/2021    LEFT KNEE TOTAL ARTHROPLASTY [facility] performed by Bolivar Gil DO at [facility] N/A 1/23/2020    EGD BIOPSY performed by Jerica Lin MD at [place] [address]       Current Outpatient Medications:     budesonide-formoterol (SYMBICORT) 160-4.5 MCG/ACT AERO, Inhale 2 puffs into the lungs 2 times daily, Disp: , Rfl:     buPROPion (WELLBUTRIN XL) 150 MG extended release tablet, Take 150 mg by mouth every morning, Disp: , Rfl:     montelukast (SINGULAIR) 10 MG tablet, nightly , Disp: , Rfl:     lisinopril-hydrochlorothiazide (PRINZIDE;ZESTORETIC) 20-12.5 MG per tablet, Take 1 tablet by mouth daily, Disp: , Rfl:     DULoxetine (CYMBALTA) 60 MG extended release capsule, Take 60 mg by mouth daily, Disp: , Rfl:     cetirizine (ZYRTEC) 10 MG tablet, Take 10 mg by mouth daily, Disp: , Rfl:     fluticasone (FLONASE) 50 MCG/ACT nasal spray, 1 spray by Nasal route daily, Disp: 1 Bottle, Rfl: 3    meloxicam (MOBIC) 7.5 MG tablet, Take 7.5 mg by mouth daily , Disp: , Rfl:     oxybutynin (DITROPAN) 5 MG tablet, Take 5 mg by mouth 3 times daily , Disp: , Rfl:     ezetimibe (ZETIA) 10 MG tablet, Take 10 mg by mouth daily, Disp: , Rfl:     omeprazole (PRILOSEC) 20 MG capsule, Take 20 mg by mouth daily. , Disp: , Rfl:     lovastatin (MEVACOR) 40 MG tablet, Take 40 mg by mouth nightly., Disp: , Rfl:     metoprolol (LOPRESSOR) 25 MG tablet, Take 25 mg by mouth nightly., Disp: , Rfl:     metFORMIN (GLUCOPHAGE) 500 MG tablet, Take 500 mg by mouth daily (with breakfast) , Disp: , Rfl:     levothyroxine (SYNTHROID) 50 MCG tablet, Take 75 mcg by mouth Daily , Disp: , Rfl:     gabapentin (NEURONTIN) 600 MG tablet, Take 600 mg by mouth 3 times daily. , Disp: , Rfl:     sertraline (ZOLOFT) 100 MG tablet, Take 100 mg by mouth nightly. Takes 1and 1/2 tablets nightly, Disp: , Rfl:     albuterol (PROVENTIL HFA;VENTOLIN HFA) 108 (90 BASE) MCG/ACT inhaler, Inhale 2 puffs into the lungs every 6 hours as needed. , Disp: , Rfl:     aspirin 325 MG EC tablet, Take 1 tablet by mouth 2 times daily for 28 days, Disp: 56 tablet, Rfl: 0    Cholecalciferol (VITAMIN D3) 1.25 MG (46280 UT) CAPS, take 1 capsule by mouth THREE TIMES A WEEK, Disp: , Rfl:     RA VITAMIN B-12 100 MCG tablet, take 1 tablet by mouth once daily, Disp: , Rfl:     ondansetron (ZOFRAN-ODT) 4 MG disintegrating tablet, Take 1 tablet by mouth every 8 hours as needed for Nausea or Vomiting, Disp: 20 tablet, Rfl: 1    Multiple Vitamins-Minerals (THERAPEUTIC MULTIVITAMIN-MINERALS) tablet, Take 1 tablet by mouth daily , Disp: , Rfl:     ferrous sulfate 325 (65 Fe) MG tablet, Take 1 tablet by mouth daily (with breakfast) Take with your multi vitamin daily. , Disp: 30 tablet, Rfl: 1    vitamin D (ERGOCALCIFEROL) 1.25 MG (18167 UT) CAPS capsule, Take 1 capsule by mouth Twice a Week, Disp: 24 capsule, Rfl: 0  Allergies   Allergen Reactions    Other      Enviromental      Codeine Nausea And Vomiting     itching     Social History     Socioeconomic History    Marital status:      Spouse name: Not on file    Number of children: Not on file    Years of education: Not on file    Highest education level: Not on file   Occupational History    Not on file   Tobacco Use    Smoking status: Former Smoker     Packs/day: 4.00     Years: 45.00     Pack years: 180.00     Types: Cigarettes     Quit date: 2013     Years since quittin.6    Smokeless tobacco: Never Used    Tobacco comment: Uses E-cig   Vaping Use    Vaping Use: Every day    Last attempt to quit: 2020   Substance and Sexual Activity    Alcohol use: No     Comment: 8 cans pop daily    Drug use: No    Sexual activity: Yes   Other Topics Concern    Not on file   Social History Narrative    Not on file     Social Determinants of Health     Financial Resource Strain:     Difficulty of Paying Living Expenses: Not on file   Food Insecurity:     Worried About Running Out of Food in the Last Year: Not on file    Wild of Food in the Last Year: Not on file   Transportation Needs:     Lack of Transportation (Medical): Not on file    Lack of Transportation (Non-Medical):  Not on file   Physical Activity:     Days of Exercise per Week: Not on file    Minutes of Exercise per Session: Not on file   Stress:     Feeling of Stress : Not on file   Social Connections:     Frequency of Communication with Friends and Family: Not on file    Frequency of Social Gatherings with Friends and Family: Not on file    Attends Taoism Services: Not on file    Active Member of Clubs or Organizations: Not on file    Attends Club or Organization Meetings: Not on file    Marital Status: Not on file   Intimate Partner Violence:     Fear of Current or Ex-Partner: Not on file    Emotionally Abused: Not on file    Physically Abused: Not on file    Sexually Abused: Not on file   Housing Stability:     Unable to Pay for Housing in the Last Year: Not on file    Number of [place] in the Last Year: Not on file    Unstable Housing in the Last Year: Not on file       Review of Systems:     Skin: (-) rash,(-) psoriasis,(-) eczema, (-)skin cancer. Musculoskeletal: (-) fractures,  (-) dislocations,(-) collagen vascular disease, (-) fibromyalgia, (-) multiple sclerosis, (-) muscular dystrophy, (-) RSD,(-) joint pain (-)swelling, (-) joint pain,swelling. Neurologic: (-) epilepsy, (-)seizures,(-) brain tumor,(-) TIA, (-)stroke, (-)headaches, (-)Parkinson disease,(-) memory loss, (-) LOC. Cardiovascular: (-) Chest pain, (-) swelling in legs/feet, (-) SOB, (-) cramping in legs/feet with walking. Subjective:    Constitutional:    The patient is alert and oriented x 3, appears to be stated age and in no distress. Temp 98 °F (36.7 °C)   Ht 5' 1\" (1.549 m)   Wt 210 lb (95.3 kg)   LMP 01/01/1993   BMI 39.68 kg/m²     Skin:  Upon inspection: the skin appears warm, dry and intact. There is a previous scar over the affected area. There is not any cellulitis, lymphedema or cutaneous lesions noted in the lower extremities. Upon palpation there is no induration noted. Neurologic:  Gait: normal;  Motor exam of the lower extremities show ; quadriceps, hamstrings, foot dorsi and plantar flexors intact R.  5/5 and L. 5/5.  Deep tendon reflexes are 2/4 at the knees and 2/4 at the ankles with strong extensor hallicus longus motor strength bilaterally. Sensory to both feet is intact to all sensory roots. Cardiovascular: The vascular exam is normal and is well perfused to distal extremities. Distal pulses DP/PT: R. 2+; L. 2+. There is cap refill noted less than two seconds in all digits. There is not edema of the bilateral lower extremities. There is not varicosities noted in the distal extremities. Lymph:  Upon palpation,  there is no lymphadenopathy noted in bilateral lower extremities. Musculoskeletal:    Lumbar exam:  On visual inspection, there is not deformity of the spine. full range of motion, no tenderness, palpable spasm or pain on motion. Special tests: Straight Leg Raise negative, Sunday testnegative. Hip exam:  Upon inspection, there is not deformity noted. Upon palpation there is not tenderness. ROM: is   full and symmetrical.   Strength: Hip Flexors 5/5; Hip Abductors 5/5; Hip Adduction 5/5. Knee exam:  Left knee exam shows;  range of motion of R. Knee is 0 to 120, and L. Knee is 0 to 120. The patient does not have  pain on motion, effusion is mild, there is not tenderness over the  medial region, there are not any masses, there is not ligamentous instability, there is not  deformity noted. Knee exam: neither positive for moderate crepitations, some mild tenderness laxity is not noted with  stress.       R. Knee:  Lachman's negative, Anterior Drawer negative, Posterior Drawer negative  Marysol's negative, Thallasy  negative,   PF grind test negative, Apprehension test negative, Patellar J sign  negative  L. Knee:  Lachman's negative, Anterior Drawer negative, Posterior Drawer negative  Marysol's negative, Thallasy  negative,   PF grind test negative, Apprehension test negative,  Patellar J sign  Negative    Xrays: left TKA well aligned with no signs of aseptic loosening    Radiographic findings reviewed with patient    Impression:   Encounter Diagnosis   Name Primary?     S/P total knee arthroplasty, left Yes       Plan:  Hep  Activity as tolerated  Discussed no loosening to prosthesis  Fu in 6 months with xr

## 2022-07-20 ENCOUNTER — HOSPITAL ENCOUNTER (OUTPATIENT)
Dept: MAMMOGRAPHY | Age: 67
Discharge: HOME | End: 2022-07-22
Payer: COMMERCIAL

## 2022-07-20 VITALS — WEIGHT: 200 LBS | BODY MASS INDEX: 37.76 KG/M2 | HEIGHT: 61 IN

## 2022-07-20 DIAGNOSIS — Z12.31 OTHER SCREENING MAMMOGRAM: ICD-10-CM

## 2022-07-20 PROCEDURE — 77067 SCR MAMMO BI INCL CAD: CPT

## 2022-07-21 ENCOUNTER — HOSPITAL ENCOUNTER (OUTPATIENT)
Age: 67
Discharge: HOME | End: 2022-07-21
Payer: COMMERCIAL

## 2022-07-21 LAB
ALBUMIN SERPL-MCNC: 4.6 G/DL (ref 3.5–5.2)
ALP BLD-CCNC: 108 U/L (ref 35–104)
ALT SERPL-CCNC: 13 U/L (ref 0–32)
ANION GAP SERPL CALCULATED.3IONS-SCNC: 12 MMOL/L (ref 7–16)
AST SERPL-CCNC: 15 U/L (ref 0–31)
BASOPHILS ABSOLUTE: 0.05 E9/L (ref 0–0.2)
BASOPHILS RELATIVE PERCENT: 0.6 % (ref 0–2)
BILIRUB SERPL-MCNC: 0.3 MG/DL (ref 0–1.2)
BILIRUBIN URINE: NEGATIVE
BLOOD, URINE: NEGATIVE
BUN BLDV-MCNC: 29 MG/DL (ref 6–23)
CALCIUM SERPL-MCNC: 9.5 MG/DL (ref 8.6–10.2)
CHLORIDE BLD-SCNC: 98 MMOL/L (ref 98–107)
CHOLESTEROL, FASTING: 170 MG/DL (ref 0–199)
CLARITY: CLEAR
CO2: 23 MMOL/L (ref 22–29)
COLOR: YELLOW
CREAT SERPL-MCNC: 1.1 MG/DL (ref 0.5–1)
CREATININE URINE: 61 MG/DL (ref 29–226)
EOSINOPHILS ABSOLUTE: 0.24 E9/L (ref 0.05–0.5)
EOSINOPHILS RELATIVE PERCENT: 2.7 % (ref 0–6)
GFR AFRICAN AMERICAN: >60
GFR NON-AFRICAN AMERICAN: 50 ML/MIN/1.73
GLUCOSE FASTING: 126 MG/DL (ref 74–99)
GLUCOSE URINE: NEGATIVE MG/DL
HBA1C MFR BLD: 6.4 % (ref 4–5.6)
HCT VFR BLD CALC: 39.1 % (ref 34–48)
HDLC SERPL-MCNC: 42 MG/DL
HEMOGLOBIN: 12.7 G/DL (ref 11.5–15.5)
IMMATURE GRANULOCYTES #: 0.07 E9/L
IMMATURE GRANULOCYTES %: 0.8 % (ref 0–5)
KETONES, URINE: NEGATIVE MG/DL
LDL CHOLESTEROL CALCULATED: 99 MG/DL (ref 0–99)
LEUKOCYTE ESTERASE, URINE: NEGATIVE
LYMPHOCYTES ABSOLUTE: 1.95 E9/L (ref 1.5–4)
LYMPHOCYTES RELATIVE PERCENT: 21.6 % (ref 20–42)
MCH RBC QN AUTO: 30.2 PG (ref 26–35)
MCHC RBC AUTO-ENTMCNC: 32.5 % (ref 32–34.5)
MCV RBC AUTO: 92.9 FL (ref 80–99.9)
MICROALBUMIN UR-MCNC: <12 MG/L
MICROALBUMIN/CREAT UR-RTO: ABNORMAL (ref 0–30)
MONOCYTES ABSOLUTE: 0.55 E9/L (ref 0.1–0.95)
MONOCYTES RELATIVE PERCENT: 6.1 % (ref 2–12)
NEUTROPHILS ABSOLUTE: 6.17 E9/L (ref 1.8–7.3)
NEUTROPHILS RELATIVE PERCENT: 68.2 % (ref 43–80)
NITRITE, URINE: NEGATIVE
PDW BLD-RTO: 12.8 FL (ref 11.5–15)
PH UA: 5.5 (ref 5–9)
PLATELET # BLD: 351 E9/L (ref 130–450)
PMV BLD AUTO: 9.3 FL (ref 7–12)
POTASSIUM SERPL-SCNC: 4.4 MMOL/L (ref 3.5–5)
PROTEIN UA: NEGATIVE MG/DL
RBC # BLD: 4.21 E12/L (ref 3.5–5.5)
SODIUM BLD-SCNC: 133 MMOL/L (ref 132–146)
SPECIFIC GRAVITY UA: 1.01 (ref 1–1.03)
T4 FREE: 1.06 NG/DL (ref 0.93–1.7)
TOTAL PROTEIN: 7.5 G/DL (ref 6.4–8.3)
TRIGLYCERIDE, FASTING: 145 MG/DL (ref 0–149)
TSH SERPL DL<=0.05 MIU/L-ACNC: 2.54 UIU/ML (ref 0.27–4.2)
URIC ACID, SERUM: 7.3 MG/DL (ref 2.4–5.7)
UROBILINOGEN, URINE: 0.2 E.U./DL
VITAMIN D 25-HYDROXY: 24 NG/ML (ref 30–100)
VLDLC SERPL CALC-MCNC: 29 MG/DL
WBC # BLD: 9 E9/L (ref 4.5–11.5)

## 2022-07-21 PROCEDURE — 82044 UR ALBUMIN SEMIQUANTITATIVE: CPT

## 2022-07-21 PROCEDURE — 36415 COLL VENOUS BLD VENIPUNCTURE: CPT

## 2022-07-21 PROCEDURE — 84550 ASSAY OF BLOOD/URIC ACID: CPT

## 2022-07-21 PROCEDURE — 80061 LIPID PANEL: CPT

## 2022-07-21 PROCEDURE — 83036 HEMOGLOBIN GLYCOSYLATED A1C: CPT

## 2022-07-21 PROCEDURE — 81003 URINALYSIS AUTO W/O SCOPE: CPT

## 2022-07-21 PROCEDURE — 84439 ASSAY OF FREE THYROXINE: CPT

## 2022-07-21 PROCEDURE — 85025 COMPLETE CBC W/AUTO DIFF WBC: CPT

## 2022-07-21 PROCEDURE — 82306 VITAMIN D 25 HYDROXY: CPT

## 2022-07-21 PROCEDURE — 80053 COMPREHEN METABOLIC PANEL: CPT

## 2022-07-21 PROCEDURE — 82570 ASSAY OF URINE CREATININE: CPT

## 2022-07-21 PROCEDURE — 84443 ASSAY THYROID STIM HORMONE: CPT

## 2022-08-08 DIAGNOSIS — M25.561 RIGHT KNEE PAIN, UNSPECIFIED CHRONICITY: Primary | ICD-10-CM

## 2022-08-16 ENCOUNTER — OFFICE VISIT (OUTPATIENT)
Dept: ORTHOPEDIC SURGERY | Age: 67
End: 2022-08-16
Payer: COMMERCIAL

## 2022-08-16 VITALS — WEIGHT: 200 LBS | BODY MASS INDEX: 37.76 KG/M2 | HEIGHT: 61 IN | TEMPERATURE: 98 F

## 2022-08-16 DIAGNOSIS — M17.11 PRIMARY OSTEOARTHRITIS OF RIGHT KNEE: Primary | ICD-10-CM

## 2022-08-16 PROCEDURE — 1036F TOBACCO NON-USER: CPT | Performed by: ORTHOPAEDIC SURGERY

## 2022-08-16 PROCEDURE — 1090F PRES/ABSN URINE INCON ASSESS: CPT | Performed by: ORTHOPAEDIC SURGERY

## 2022-08-16 PROCEDURE — 3017F COLORECTAL CA SCREEN DOC REV: CPT | Performed by: ORTHOPAEDIC SURGERY

## 2022-08-16 PROCEDURE — 99214 OFFICE O/P EST MOD 30 MIN: CPT | Performed by: ORTHOPAEDIC SURGERY

## 2022-08-16 PROCEDURE — G8417 CALC BMI ABV UP PARAM F/U: HCPCS | Performed by: ORTHOPAEDIC SURGERY

## 2022-08-16 PROCEDURE — G8427 DOCREV CUR MEDS BY ELIG CLIN: HCPCS | Performed by: ORTHOPAEDIC SURGERY

## 2022-08-16 PROCEDURE — 1123F ACP DISCUSS/DSCN MKR DOCD: CPT | Performed by: ORTHOPAEDIC SURGERY

## 2022-08-16 PROCEDURE — G8399 PT W/DXA RESULTS DOCUMENT: HCPCS | Performed by: ORTHOPAEDIC SURGERY

## 2022-08-16 NOTE — PROGRESS NOTES
Chief Complaint   Patient presents with    Knee Pain     Right Knee F/U, pending surgery on 08/31/2022       Subjective:     Patient ID: Migdalia Arriaga is a 73 y.o..  female    Knee Pain  Patient follows up today for her right  knee pain. She would like to discuss surgical intervention.  Conservative treatments have failed and it is affecting her ADLs    Past Medical History:   Diagnosis Date    Arthritis     back, cervical neck    Asthma     Breast mass     fibercystic     Chronic sinusitis     Colon polyps     COPD (chronic obstructive pulmonary disease) ([address]     controlled with inhalers    Depression     Diabetes mellitus ([address]     Diabetic polyneuropathy ([address]     Diverticulitis     Diverticulosis     GERD (gastroesophageal reflux disease)     Hyperlipidemia     Hypertension     Mitral valve prolapse     f/u w/ PCP only - no symptoms    Neuropathy     Overactive thyroid gland     Shortness of breath     Sleep apnea     Thyroid disease      Past Surgical History:   Procedure Laterality Date    ABDOMINAL EXPLORATION SURGERY      APPENDECTOMY  1968    BRONCHOSCOPY  Oct 2013    Bronchoscopy and Mediastinoscopy    CARPAL TUNNEL RELEASE      bilat    CHOLECYSTECTOMY, LAPAROSCOPIC N/A 5/12/2020    CHOLECYSTECTOMY LAPAROSCOPIC WITH IOC performed by Ta Voss MD at [address] [place] [address] Dr ARTHROSCOPY Left 8/4/2021    LEFT KNEE ARTHROSCOPY, MEDIAL MENISCECTOMY AND DEBRIDEMENT performed by Eloy Boeck, DO at [facility]  10/1995    left    [address] Left 11/10/2021    LEFT KNEE TOTAL ARTHROPLASTY [facility]) performed by Eloy Boeck, DO at [address] [place] [address] N/A 1/23/2020    EGD BIOPSY performed by Ta Voss MD at [place] [address]       Current Outpatient Medications:     budesonide-formoterol (SYMBICORT) 160-4.5 MCG/ACT AERO, Inhale 2 puffs into the lungs 2 times daily, Disp: , Rfl:     buPROPion Enviromental      Codeine Nausea And Vomiting     itching     Social History     Socioeconomic History    Marital status:      Spouse name: Not on file    Number of children: Not on file    Years of education: Not on file    Highest education level: Not on file   Occupational History    Not on file   Tobacco Use    Smoking status: Former     Packs/day: 4.00     Years: 45.00     Pack years: 180.00     Types: Cigarettes     Quit date: 2013     Years since quittin.9    Smokeless tobacco: Never    Tobacco comments:     Uses E-cig   Vaping Use    Vaping Use: Every day    Last attempt to quit: 2020   Substance and Sexual Activity    Alcohol use: No     Comment: 8 cans pop daily    Drug use: No    Sexual activity: Yes   Other Topics Concern    Not on file   Social History Narrative    Not on file     Social Determinants of Health     Financial Resource Strain: Not on file   Food Insecurity: Not on file   Transportation Needs: Not on file   Physical Activity: Not on file   Stress: Not on file   Social Connections: Not on file   Intimate Partner Violence: Not on file   Housing Stability: Not on file     Family History   Problem Relation Age of Onset    Cancer Mother     Esophageal Cancer Mother     Lung Cancer Mother     Cancer Father     Lymphoma Father     Cancer Maternal Grandmother     Stomach Cancer Maternal Grandmother     Liver Cancer Maternal Grandmother     Lung Cancer Maternal Grandfather     Cancer Maternal Grandfather     No Known Problems Paternal Grandmother     No Known Problems Paternal Grandfather     Lung Cancer Maternal Aunt     Esophageal Cancer Maternal Aunt     Lung Cancer Maternal Uncle     Prostate Cancer Maternal Uncle          REVIEW OF SYSTEMS:     General/Constitution:  (-)weight loss, (-)fever, (-)chills, (-)weakness. Skin: (-) rash,(-) psoriasis,(-) eczema, (-)skin cancer.    Musculoskeletal: (-) fractures,  (-) dislocations,(-) collagen vascular disease, (-) fibromyalgia, (-) multiple sclerosis, (-) muscular dystrophy, (-) RSD,(-) joint pain (-)swelling, (-) joint pain,swelling. Neurologic: (-) epilepsy, (-)seizures,(-) brain tumor,(-) TIA, (-)stroke, (-)headaches, (-)Parkinson disease,(-) memory loss, (-) LOC. Cardiovascular: (-) Chest pain, (-) swelling in legs/feet, (-) SOB, (-) cramping in legs/feet with walking. Respiratory: (-) SOB, (-) Coughing, (-) night sweats. GI: (-) nausea, (-) vomiting, (-) diarrhea, (-) blood in stool, (-) gastric ulcer. Psychiatric: (-) Depression, (-) Anxiety, (-) bipolar disease, (-) Alzheimer's Disease  Allergic/Immunologic: (-) allergies latex, (-) allergies metal, (-) skin sensitivity. Hematlogic: (-) anemia, (-) blood transfusion, (-) DVT/PE, (-) Clotting disorders    Subjective:  _Temp 98 °F (36.7 °C)   Ht 5' 1\" (1.549 m)   Wt 200 lb (90.7 kg)   LMP 01/01/1993   BMI 37.79 kg/m²  Vital signs are stable. In general, patient is awake, alert and oriented X3, in no apparent distress. Examination of HENT reveals normocephalic, atraumatic. PERRLA/EOMI sclera are white. Conjunctivae are clear. TM's are intact. Pharynx is pink and moist.  Uvula and tongue are midline. Heart: Positive S1 and positive S2 with regular rate and rhythm. Lungs: Clear to auscultation bilaterally without rales, rhonchi or wheezes. Abdomen: soft, nontender. Positive bowel sounds. No organomegaly. No guarding or rigidity. Constitution:  Temp 98 °F (36.7 °C)   Ht 5' 1\" (1.549 m)   Wt 200 lb (90.7 kg)   LMP 01/01/1993   BMI 37.79 kg/m²     Psycihatric:  The patient is alert and oriented x 3, appears to be stated age and in no distress. Respiratory:  Respiratory effort is not labored. Patient is not gasping. Palpation of the chest reveals no tactile fremitus. Skin:  Upon inspection: the skin appears warm, dry and intact. There is  a previous scar over the affected area. There is any cellulitis, lymphedema or cutaneous lesions noted in the lower extremities. Upon palpation there is no induration noted. Neurologic:  Gait: antalgic; Motor exam of the lower extremities show ; quadriceps, hamstrings, foot dorsi and plantar flexors intact R.  5/5 and L. 5/5. Deep tendon reflexes are 2/4 at the knees and 2/4 at the ankles with strong extensor hallicus longus motor strength bilaterally. Sensory to both feet is intact to all sensory roots. Cardiovascular: The vascular exam is normal and is well perfused to distal extremities. Distal pulses DP/PT: R. 2+; L. 2+. There is cap refill noted less than two seconds in all digits. There is not edema of the bilateral lower extremities. There is not varicosities noted in the distal extremities. Lymph:  Upon palpation,  there is no lymphadenopathy noted in bilateral lower extremities. Musculoskeletal:  Gait: antalgic; examination of the nails and digits reveal no cyanosis or clubbing. Lumbar exam:  On visual inspection, there is not deformity of the spine. full range of motion, no tenderness, palpable spasm or pain on motion. Special tests: Straight Leg Raise negative, Sunday test negative. Hip exam:   Upon inspection, there is not deformity noted. Upon palpation there is not tenderness. ROM: is  full and symmetrical.   Strength: Hip Flexors 5/5; Hip Abductors 5/5; Hip Adduction 5/5. Knee exam:  right knee exam shows;  range of motion of R. Knee is 0 to 120, and L. Knee is 0 to 120. The patient does have  pain on motion, effusion is mild, there is tenderness over the  global region, there are not any masses, there is not ligamentous instability, there is  deformity noted. Knee exam: bilateral positive for moderate crepitations, some mild tenderness laxity is not noted with  stress.   There is not a popliteal cyst.    R. Knee:  Lachman's negative, Anterior Drawer negative, Posterior Drawer negative  Marysol's positive, Thallasy  positive,   PF grind test positive, Apprehension test negative, Patellar J sign  negative  L. Knee:  Lachman's negative, Anterior Drawer negative, Posterior Drawer negative  Marysol's negative , Thallasy  negative ,   PF grind test negative , Apprehension test negative,  Patellar J sign  negative    Xray Exam:  Left knee: Moderate osteoarthritis at the medial weight-bearing compartment   with milder involvement elsewhere. Very small joint effusion. No fracture   or dislocation. Right knee: Moderate osteoarthritis at the medial weight-bearing compartment   with milder involvement elsewhere. No fracture or dislocation. Normal soft   tissues. MRI:  Medial meniscus tear. Tricompartment degenerative changes most significant to the medial   compartment. Trace knee joint effusion. Radiographic findings reviewed with patient    Assessment:  Encounter Diagnoses   Name Primary? Primary osteoarthritis of right knee Yes       Plan:  Natural history and expected course discussed. Questions answered. Educational materials distributed. Rest, ice, compression, and elevation (RICE) therapy. Reduction in offending activity. I had a lengthy discussion with the patient regarding their diagnosis. I explained treatment options including surgical vs non surgical treatment. I reviewed in detail the risks and benefits and outlined the procedure in detail with expected outcomes and possible complications. I also discussed non surgical treatment such as injections (CSI and visco supplementation), physical therapy, topical creams and NSAID's. They have elected for surgical management at this time. We discussed various treatment options both surgical and non-surgical.   The patient is unable to ambulate more than 100 feet and is unable to perform the average daily activities including:  Light housework, ADLs, donning clothes, toileting and exercise.   Patient has failed previous conservative measures including cortisone injections, NSAIDs, PT, HEP and pain medication and is currently a fall risk to the disability and decreased functioning. The patient wishes to have the total knee arthroplasty. The risks and benefits of a total knee replacement were discussed with the patient. The risks include but are not limited to: infection, injury to blood vessels and nerves, non relief of symptoms, arthrofibrosis of knee, aseptic loosening of prosthesis, intraoperative fracture, blood loss, PE/DVT, MI, dislocation of hip and knee, need for further operative intervention and death. The patient is aware of the risks and wished to proceed with a Right total knee replacement 8/31/22. We will obtain medical clearence from the PCP. At least 30 minutes was spent discussing the diagnosis and treatment options with the patient with at least 50% of the time was spent with decision making and counseling the patient. The patient was counseled at length about the risks of félix Covid-19 during their perioperative period and any recovery window from their procedure. The patient was made aware that félix Covid-19  may worsen their prognosis for recovering from their procedure  and lend to a higher morbidity and/or mortality risk. All material risks, benefits, and reasonable alternatives including postponing the procedure were discussed. The patient does wish to proceed with the procedure at this time.

## 2022-08-19 ENCOUNTER — TELEPHONE (OUTPATIENT)
Dept: ORTHOPEDIC SURGERY | Age: 67
End: 2022-08-19

## 2022-08-24 RX ORDER — SODIUM CHLORIDE 9 MG/ML
INJECTION, SOLUTION INTRAVENOUS CONTINUOUS
Status: CANCELLED | OUTPATIENT
Start: 2022-08-24

## 2022-08-26 ENCOUNTER — HOSPITAL ENCOUNTER (OUTPATIENT)
Dept: PREADMISSION TESTING | Age: 67
Discharge: HOME | End: 2022-08-26
Payer: COMMERCIAL

## 2022-08-26 ENCOUNTER — ANESTHESIA EVENT (OUTPATIENT)
Dept: OPERATING ROOM | Age: 67
End: 2022-08-26
Payer: COMMERCIAL

## 2022-08-26 VITALS
RESPIRATION RATE: 18 BRPM | HEART RATE: 78 BPM | WEIGHT: 200.7 LBS | DIASTOLIC BLOOD PRESSURE: 60 MMHG | TEMPERATURE: 97 F | BODY MASS INDEX: 37.89 KG/M2 | OXYGEN SATURATION: 97 % | HEIGHT: 61 IN | SYSTOLIC BLOOD PRESSURE: 112 MMHG

## 2022-08-26 DIAGNOSIS — M17.11 PRIMARY OSTEOARTHRITIS OF RIGHT KNEE: ICD-10-CM

## 2022-08-26 DIAGNOSIS — Z01.818 PRE-OP TESTING: Primary | ICD-10-CM

## 2022-08-26 LAB
ALBUMIN SERPL-MCNC: 4.9 G/DL (ref 3.5–5.2)
ALP BLD-CCNC: 101 U/L (ref 35–104)
ALT SERPL-CCNC: 12 U/L (ref 0–32)
ANION GAP SERPL CALCULATED.3IONS-SCNC: 10 MMOL/L (ref 7–16)
APTT: 29.7 SEC (ref 24.5–35.1)
AST SERPL-CCNC: 14 U/L (ref 0–31)
BASOPHILS ABSOLUTE: 0.05 E9/L (ref 0–0.2)
BASOPHILS RELATIVE PERCENT: 0.5 % (ref 0–2)
BILIRUB SERPL-MCNC: 0.4 MG/DL (ref 0–1.2)
BILIRUBIN URINE: NEGATIVE
BLOOD, URINE: NEGATIVE
BUN BLDV-MCNC: 17 MG/DL (ref 6–23)
CALCIUM SERPL-MCNC: 10.4 MG/DL (ref 8.6–10.2)
CHLORIDE BLD-SCNC: 94 MMOL/L (ref 98–107)
CLARITY: CLEAR
CO2: 26 MMOL/L (ref 22–29)
COLOR: YELLOW
CREAT SERPL-MCNC: 1 MG/DL (ref 0.5–1)
EOSINOPHILS ABSOLUTE: 0.18 E9/L (ref 0.05–0.5)
EOSINOPHILS RELATIVE PERCENT: 1.8 % (ref 0–6)
GFR AFRICAN AMERICAN: >60
GFR NON-AFRICAN AMERICAN: 55 ML/MIN/1.73
GLUCOSE BLD-MCNC: 97 MG/DL (ref 74–99)
GLUCOSE URINE: NEGATIVE MG/DL
HBA1C MFR BLD: 6.9 % (ref 4–5.6)
HCT VFR BLD CALC: 38.1 % (ref 34–48)
HEMOGLOBIN: 12.9 G/DL (ref 11.5–15.5)
IMMATURE GRANULOCYTES #: 0.05 E9/L
IMMATURE GRANULOCYTES %: 0.5 % (ref 0–5)
INR BLD: 1
KETONES, URINE: NEGATIVE MG/DL
LEUKOCYTE ESTERASE, URINE: NEGATIVE
LYMPHOCYTES ABSOLUTE: 3.05 E9/L (ref 1.5–4)
LYMPHOCYTES RELATIVE PERCENT: 30.4 % (ref 20–42)
MCH RBC QN AUTO: 30.8 PG (ref 26–35)
MCHC RBC AUTO-ENTMCNC: 33.9 % (ref 32–34.5)
MCV RBC AUTO: 90.9 FL (ref 80–99.9)
MONOCYTES ABSOLUTE: 0.63 E9/L (ref 0.1–0.95)
MONOCYTES RELATIVE PERCENT: 6.3 % (ref 2–12)
NEUTROPHILS ABSOLUTE: 6.08 E9/L (ref 1.8–7.3)
NEUTROPHILS RELATIVE PERCENT: 60.5 % (ref 43–80)
NITRITE, URINE: NEGATIVE
PDW BLD-RTO: 12.7 FL (ref 11.5–15)
PH UA: 5.5 (ref 5–9)
PLATELET # BLD: 346 E9/L (ref 130–450)
PMV BLD AUTO: 9.3 FL (ref 7–12)
POTASSIUM REFLEX MAGNESIUM: 3.9 MMOL/L (ref 3.5–5)
PREALBUMIN: 23 MG/DL (ref 20–40)
PROTEIN UA: NEGATIVE MG/DL
PROTHROMBIN TIME: 10.9 SEC (ref 9.3–12.4)
RBC # BLD: 4.19 E12/L (ref 3.5–5.5)
SODIUM BLD-SCNC: 130 MMOL/L (ref 132–146)
SPECIFIC GRAVITY UA: 1.01 (ref 1–1.03)
TOTAL PROTEIN: 7.8 G/DL (ref 6.4–8.3)
UROBILINOGEN, URINE: 0.2 E.U./DL
WBC # BLD: 10 E9/L (ref 4.5–11.5)

## 2022-08-26 PROCEDURE — 87088 URINE BACTERIA CULTURE: CPT

## 2022-08-26 PROCEDURE — 81003 URINALYSIS AUTO W/O SCOPE: CPT

## 2022-08-26 PROCEDURE — 87081 CULTURE SCREEN ONLY: CPT

## 2022-08-26 PROCEDURE — 36415 COLL VENOUS BLD VENIPUNCTURE: CPT

## 2022-08-26 PROCEDURE — 80053 COMPREHEN METABOLIC PANEL: CPT

## 2022-08-26 PROCEDURE — 84134 ASSAY OF PREALBUMIN: CPT

## 2022-08-26 PROCEDURE — 85730 THROMBOPLASTIN TIME PARTIAL: CPT

## 2022-08-26 PROCEDURE — 85610 PROTHROMBIN TIME: CPT

## 2022-08-26 PROCEDURE — 83036 HEMOGLOBIN GLYCOSYLATED A1C: CPT

## 2022-08-26 PROCEDURE — 85025 COMPLETE CBC W/AUTO DIFF WBC: CPT

## 2022-08-26 RX ORDER — ROSUVASTATIN CALCIUM 40 MG/1
40 TABLET, COATED ORAL EVERY EVENING
COMMUNITY

## 2022-08-26 RX ORDER — ASPIRIN 81 MG/1
81 TABLET ORAL DAILY
Status: ON HOLD | COMMUNITY
End: 2022-08-31 | Stop reason: HOSPADM

## 2022-08-26 ASSESSMENT — ENCOUNTER SYMPTOMS
SHORTNESS OF BREATH: 1
DYSPNEA ACTIVITY LEVEL: AFTER AMBULATING 1 FLIGHT OF STAIRS

## 2022-08-26 ASSESSMENT — PAIN DESCRIPTION - ORIENTATION: ORIENTATION: LEFT;RIGHT

## 2022-08-26 ASSESSMENT — PAIN DESCRIPTION - DESCRIPTORS: DESCRIPTORS: ACHING;SORE

## 2022-08-26 ASSESSMENT — LIFESTYLE VARIABLES: SMOKING_STATUS: 1

## 2022-08-26 ASSESSMENT — PAIN DESCRIPTION - LOCATION: LOCATION: KNEE

## 2022-08-26 ASSESSMENT — COPD QUESTIONNAIRES: CAT_SEVERITY: MODERATE

## 2022-08-26 ASSESSMENT — PAIN SCALES - GENERAL: PAINLEVEL_OUTOF10: 5

## 2022-08-26 ASSESSMENT — PAIN DESCRIPTION - PAIN TYPE: TYPE: CHRONIC PAIN

## 2022-08-26 NOTE — ANESTHESIA PRE PROCEDURE
Department of Anesthesiology  Preprocedure Note       Name:  Arnie Gomez   Age:  73 y.o.  :  1955                                          MRN:  [id number]         Date:  2022      Surgeon: Lazara Hampton): Saleem Starks DO    Procedure: LEFT TOTAL KNEE REPLACEMENT. Medications prior to admission:   Prior to Admission medications    Medication Sig Start Date End Date Taking? Authorizing Provider   aspirin 325 MG EC tablet Take 1 tablet by mouth 2 times daily for 28 days 11/10/21 12/8/21  Eliseo Humphrey DO   RA VITAMIN B-12 100 MCG tablet take 1 tablet by mouth once daily 21   Historical Provider, MD   budesonide-formoterol (SYMBICORT) 160-4.5 MCG/ACT AERO Inhale 2 puffs into the lungs 2 times daily    Historical Provider, MD   Multiple Vitamins-Minerals (THERAPEUTIC MULTIVITAMIN-MINERALS) tablet Take 1 tablet by mouth daily     Historical Provider, MD   buPROPion (WELLBUTRIN XL) 150 MG extended release tablet Take 150 mg by mouth every morning    Historical Provider, MD   montelukast (SINGULAIR) 10 MG tablet nightly  20   Historical Provider, MD   lisinopril-hydrochlorothiazide (PRINZIDE;ZESTORETIC) 20-12.5 MG per tablet Take 1 tablet by mouth daily    Historical Provider, MD   DULoxetine (CYMBALTA) 60 MG extended release capsule Take 60 mg by mouth daily    Historical Provider, MD   cetirizine (ZYRTEC) 10 MG tablet Take 10 mg by mouth daily    Historical Provider, MD   fluticasone (FLONASE) 50 MCG/ACT nasal spray 1 spray by Nasal route daily 3/7/17   Manda Parker DO   meloxicam (MOBIC) 7.5 MG tablet Take 7.5 mg by mouth daily     Historical Provider, MD   oxybutynin (DITROPAN) 5 MG tablet Take 5 mg by mouth 3 times daily     Historical Provider, MD   ezetimibe (ZETIA) 10 MG tablet Take 10 mg by mouth daily    Historical Provider, MD   omeprazole (PRILOSEC) 20 MG capsule Take 20 mg by mouth daily.     Historical Provider, MD   lovastatin (MEVACOR) 40 MG tablet Take 40 mg by mouth nightly. Historical Provider, MD   metoprolol (LOPRESSOR) 25 MG tablet Take 25 mg by mouth nightly. Historical Provider, MD   metFORMIN (GLUCOPHAGE) 500 MG tablet Take 500 mg by mouth daily (with breakfast)     Historical Provider, MD   levothyroxine (SYNTHROID) 50 MCG tablet Take 75 mcg by mouth Daily     Historical Provider, MD   gabapentin (NEURONTIN) 600 MG tablet Take 600 mg by mouth 3 times daily. Historical Provider, MD   sertraline (ZOLOFT) 100 MG tablet Take 100 mg by mouth nightly. Takes 1and 1/2 tablets nightly    Historical Provider, MD   albuterol (PROVENTIL HFA;VENTOLIN HFA) 108 (90 BASE) MCG/ACT inhaler Inhale 2 puffs into the lungs every 6 hours as needed.     Historical Provider, MD       Current medications:    Current Outpatient Medications   Medication Sig Dispense Refill    aspirin 325 MG EC tablet Take 1 tablet by mouth 2 times daily for 28 days 56 tablet 0    RA VITAMIN B-12 100 MCG tablet take 1 tablet by mouth once daily      budesonide-formoterol (SYMBICORT) 160-4.5 MCG/ACT AERO Inhale 2 puffs into the lungs 2 times daily      Multiple Vitamins-Minerals (THERAPEUTIC MULTIVITAMIN-MINERALS) tablet Take 1 tablet by mouth daily       buPROPion (WELLBUTRIN XL) 150 MG extended release tablet Take 150 mg by mouth every morning      montelukast (SINGULAIR) 10 MG tablet nightly       lisinopril-hydrochlorothiazide (PRINZIDE;ZESTORETIC) 20-12.5 MG per tablet Take 1 tablet by mouth daily      DULoxetine (CYMBALTA) 60 MG extended release capsule Take 60 mg by mouth daily      cetirizine (ZYRTEC) 10 MG tablet Take 10 mg by mouth daily      fluticasone (FLONASE) 50 MCG/ACT nasal spray 1 spray by Nasal route daily 1 Bottle 3    meloxicam (MOBIC) 7.5 MG tablet Take 7.5 mg by mouth daily       oxybutynin (DITROPAN) 5 MG tablet Take 5 mg by mouth 3 times daily       ezetimibe (ZETIA) 10 MG tablet Take 10 mg by mouth daily      omeprazole (PRILOSEC) 20 MG capsule Take 20 mg by mouth daily.      lovastatin (MEVACOR) 40 MG tablet Take 40 mg by mouth nightly.  metoprolol (LOPRESSOR) 25 MG tablet Take 25 mg by mouth nightly.  metFORMIN (GLUCOPHAGE) 500 MG tablet Take 500 mg by mouth daily (with breakfast)       levothyroxine (SYNTHROID) 50 MCG tablet Take 75 mcg by mouth Daily       gabapentin (NEURONTIN) 600 MG tablet Take 600 mg by mouth 3 times daily.  sertraline (ZOLOFT) 100 MG tablet Take 100 mg by mouth nightly. Takes 1and 1/2 tablets nightly      albuterol (PROVENTIL HFA;VENTOLIN HFA) 108 (90 BASE) MCG/ACT inhaler Inhale 2 puffs into the lungs every 6 hours as needed. No current facility-administered medications for this visit. Allergies:     Allergies   Allergen Reactions    Other      Enviromental      Codeine Nausea And Vomiting     itching       Problem List:    Patient Active Problem List   Diagnosis Code    Gastroesophageal reflux disease without esophagitis K21.9    Type 2 diabetes mellitus with stage 3 chronic kidney disease, without long-term current use of insulin ([facility]) E11.22, N18.30    Essential hypertension I10    Mixed hyperlipidemia E78.2    Chronic obstructive pulmonary disease ([facility] J44.9    Moderate obesity E66.8    Exertional dyspnea R06.00    Primary osteoarthritis of left knee M17.12    Primary osteoarthritis of one knee, left M17.12    Status post total left knee replacement Z96.652    Primary osteoarthritis of right knee M17.11       Past Medical History:        Diagnosis Date    Arthritis     back, cervical neck    Asthma     Breast mass     fibercystic     Chronic sinusitis     Colon polyps     COPD (chronic obstructive pulmonary disease) ([facility])     controlled with inhalers    Depression     Diabetes mellitus ([address]     Diabetic polyneuropathy ([facility]     Diverticulitis     Diverticulosis     GERD (gastroesophageal reflux disease)     Hyperlipidemia     Hypertension     Mitral valve prolapse     f/u w/ PCP only - no symptoms    Neuropathy     Overactive thyroid gland     Shortness of breath     Sleep apnea     Thyroid disease        Past Surgical History:        Procedure Laterality Date    ABDOMINAL EXPLORATION SURGERY      APPENDECTOMY  1968    BRONCHOSCOPY  Oct 2013    Bronchoscopy and Mediastinoscopy    CARPAL TUNNEL RELEASE      bilat    CHOLECYSTECTOMY, LAPAROSCOPIC N/A 2020    CHOLECYSTECTOMY LAPAROSCOPIC WITH IOC performed by Merlin Burkitt, MD at [facility] ARTHROSCOPY Left 2021    LEFT KNEE ARTHROSCOPY, MEDIAL MENISCECTOMY AND DEBRIDEMENT performed by Iris Ward DO at [address] [place] [address] Drive  10/1995    left    TONSILLECTOMY  1972    TOTAL KNEE ARTHROPLASTY Left 11/10/2021    LEFT KNEE TOTAL ARTHROPLASTY [facility]) performed by Iris Ward DO at [place] [address] N/A 2020    EGD BIOPSY performed by Merlin Burkitt, MD at [facility] History:    Social History     Tobacco Use    Smoking status: Former     Packs/day: 4.00     Years: 45.00     Pack years: 180.00     Types: Cigarettes     Quit date: 2013     Years since quittin.9    Smokeless tobacco: Never    Tobacco comments:     Uses E-cig   Substance Use Topics    Alcohol use: No     Comment: 8 cans pop daily                                Counseling given: Not Answered  Tobacco comments: Uses E-cig      Vital Signs (Current): There were no vitals filed for this visit.                                            BP Readings from Last 3 Encounters:   21 (!) 90/58   11/10/21 128/76   21 (!) 109/54       NPO Status:                                                                                 BMI:   Wt Readings from Last 3 Encounters:   22 200 lb (90.7 kg)   22 200 lb (90.7 kg)   22 210 lb (95.3 kg)     There is no height or weight on file to calculate BMI.    CBC:   Lab Results   Component Value Date/Time WBC 9.0 07/21/2022 08:48 AM    RBC 4.21 07/21/2022 08:48 AM    HGB 12.7 07/21/2022 08:48 AM    HCT 39.1 07/21/2022 08:48 AM    MCV 92.9 07/21/2022 08:48 AM    RDW 12.8 07/21/2022 08:48 AM     07/21/2022 08:48 AM       CMP:   Lab Results   Component Value Date/Time     07/21/2022 08:48 AM    K 4.4 07/21/2022 08:48 AM    K 4.6 07/29/2021 11:00 AM    CL 98 07/21/2022 08:48 AM    CO2 23 07/21/2022 08:48 AM    BUN 29 07/21/2022 08:48 AM    CREATININE 1.1 07/21/2022 08:48 AM    GFRAA >60 07/21/2022 08:48 AM    LABGLOM 50 07/21/2022 08:48 AM    GLUCOSE 131 11/11/2021 05:01 AM    GLUCOSE 107 03/09/2012 09:20 AM    PROT 7.5 07/21/2022 08:48 AM    CALCIUM 9.5 07/21/2022 08:48 AM    BILITOT 0.3 07/21/2022 08:48 AM    ALKPHOS 108 07/21/2022 08:48 AM    AST 15 07/21/2022 08:48 AM    ALT 13 07/21/2022 08:48 AM       POC Tests: No results for input(s): POCGLU, POCNA, POCK, POCCL, POCBUN, POCHEMO, POCHCT in the last 72 hours. Coags:   Lab Results   Component Value Date/Time    PROTIME 11.4 11/05/2021 11:50 AM    INR 1.0 11/05/2021 11:50 AM    APTT 27.3 11/05/2021 11:50 AM       HCG (If Applicable): No results found for: PREGTESTUR, PREGSERUM, HCG, HCGQUANT     ABGs: No results found for: PHART, PO2ART, WVK2SCV, BTS2EXN, BEART, M3HYGKKM     Type & Screen (If Applicable):  No results found for: LABABO, LABRH    Drug/Infectious Status (If Applicable):  No results found for: HIV, HEPCAB    COVID-19 Screening (If Applicable):   Lab Results   Component Value Date/Time    COVID19 Not Detected 05/08/2020 08:15 AM       EKG 6/12/14  Sinus bradycardia   Otherwise normal      ECHO 3/13/20   Conclusions      Summary   Left ventricular internal dimensions were normal in diastole and systole. No regional wall motion abnormalities seen. Normal left ventricular ejection fraction. Mild mitral regurgitation is present. Structurally normal aortic valve. Physiologic and/or trace aortic regurgitation is noted.    Mild tricuspid regurgitation. CXR 3/11/20  HISTORY: Shortness of breath       TECHNIQUE: PA and lateral  views of the chest were obtained.       FINDINGS:         SUPPORT DEVICES: None.       LUNGS: Clear with no areas of consolidation. Calcified granuloma in   the left lower lung zone is unchanged.       PLEURA: No pneumothorax visualized.       LUNG VOLUMES: Satisfactory inspirator effort.       MEDIASTINAL STRUCTURES: No lymphadenopathy. Normal aortic contour.        HEART SIZE: Normal.        UPPER ABDOMEN: Unremarkable.       BONES AND SOFT TISSUES: No fracture or soft tissue abnormality.            Impression   1. No acute findings. Anesthesia Evaluation  Patient summary reviewed and Nursing notes reviewed no history of anesthetic complications (Pt denies ):   Airway: Mallampati: III  TM distance: <3 FB   Neck ROM: limited  Mouth opening: > = 3 FB   Dental:    (+) edentulous, upper dentures and lower dentures      Pulmonary:   (+) COPD: moderate and severe,  shortness of breath: no interval change,  sleep apnea: on noncompliant,  decreased breath sounds asthma (Moderate persistent - denies recent exacerbations or rescue inhaler usage): seasonal asthma, current smoker (former smoker 4 PPD x  50 years, she smoked E Cigarette today AM. )          Patient smoked on day of surgery. ROS comment: Former - 180 pack years  Quit Smokin13       Cardiovascular:  Exercise tolerance: poor (<4 METS),   (+) hypertension: no interval change, valvular problems/murmurs (Mild MR, mild TR): MR, ELIZABETH: after ambulating 1 flight of stairs, murmur: Grade 1, hyperlipidemia    (-) past MI, CAD, CABG/stent, dysrhythmias and  angina    ECG reviewed  Rhythm: regular  Rate: normal  Echocardiogram reviewed  Stress test reviewed       Beta Blocker:  Dose within 24 Hrs      ROS comment: Pt denies any palpitations. ECHO 3/2020:  Summary  Left ventricular internal dimensions were normal in diastole and systole.  No regional wall motion abnormalities seen. Normal left ventricular ejection fraction. Mild mitral regurgitation is present. Structurally normal aortic valve. Physiologic and/or trace aortic regurgitation is noted. Mild tricuspid regurgitation. Stress Test 2014: IMPRESSION:  The ejection fraction is greater than 75%. Gated wall motion study:  Evaluation of left ventricular contractility following the stress portion of the examination reveals no global or segmental abnormality of kinesis. The left ventricle is not dilated. IMPRESSION: No significant abnormality seen in left ventricular contractility and wall motion evaluation. EKG 7/2021:  Normal sinus rhythm rsr' in V1 OTHERWISE NORMAL EKG No previous ECGs available Confirmed by Jignesh Austin ([id number]) on 7/29/2021 9:12:45 PM     Neuro/Psych:   (+) neuromuscular disease (Diabetic neuropathy):, psychiatric history (Depression):   (-) seizures, TIA and CVA            ROS comment: Paresthesias of bilat forearms, hands, and feet per pt report   Diabetic Neuropathy GI/Hepatic/Renal:   (+) GERD: well controlled, renal disease (CKD stage III:  creatinine 1.0 & GFR 53): CRI,      (-) hepatitis      ROS comment: Colon polyps  Diverticular disease. Endo/Other:    (+) Diabetes (A1C 6.9%)Type II DM, no interval change, , hypothyroidism, hyperthyroidism: arthritis: OA., electrolyte abnormalities (Hyponatremia (Na+ 130 mmol/L)), . Pt had no PAT visit       Abdominal:   (+) obese,           Vascular: negative vascular ROS. - DVT and PE.       ROS comment: Pt denies . Other Findings: Left AC 20 g            Anesthesia Plan      general, spinal and regional     ASA 4     (Single shot right adductor canal nerve block.)  Induction: intravenous. BIS  MIPS: Postoperative opioids intended and Prophylactic antiemetics administered. Anesthetic plan and risks discussed with patient. Use of blood products discussed with patient whom consented to blood products. Plan discussed with CRNA. Attending anesthesiologist reviewed and agrees with Preprocedure content                Roseline Lo MD   8/26/2022      ------DOS anesthesiologist addendum------  Patient seen and evaluated. Plan for spinal anesthetic, in addition to  preoperative left saphenous peripheral nerve block, discussed with patient. All patient's questions were answered to her satisfaction. Patient consents to and agrees to spinal anesthetic, in addition to  preoperative left saphenous peripheral nerve block. Possible need for GETA, in event of unsuccessful spinal technique, was discussed with patient. Patient expresses understanding regarding this possibility. Patient consents to and agrees to GETA in event of unsuccessful spinal anesthetic. Roseline Lo MD  8/26/22    PNB Consent:    Benefits, alternatives and risks of PNBs were discussed with patient. Risks include but are not limited to bleeding, LAST, infection and possible nerve trauma. PNB was recommended and encouraged as part of multi-modal pain therapy. All questions were answered. After consideration of the above, the patient agrees to:   right Adductor Canal Block & iPACK's Block for management of post-op pain. Sedrick Martínez DO  Staff Anesthesiologist  August 31, 2022  8:58 AM    DOS STAFF ADDENDUM:    Patient seen and chart reviewed on DOS. No interval change in history or exam.   I agree with the anesthesia pre-operative assessment written above and have made the appropriate addendums and/or changes. Anesthesia plan discussed and risks/benefits addressed. Patient's concerns and questions answered. NPO >8 hours.      Sedrick Martínez DO  August 31, 2022  8:58 AM

## 2022-08-26 NOTE — PROGRESS NOTES
Patient attended preoperative Total Joint Camp on 8/26/2022. Patient is scheduled to have an elective knee replacement. Patient was educated regarding Disease Process, Medications, Smoking Cessation, Oxygenation, Incentive Spirometry and Deep Breath and Cough, signs and symptoms of postoperative joint infection that include: Fever, Chills, Pain Control, Drainage and Redness, post-op follow up with orthopaedic surgeon, dressing removal, staple removal, ambulatory devices which include a wheeled walker and cane, bed mobility, correct anatomical alignment, active range of motion, proper transferring technique, incision care, infection prevention measures, non-pharmacologic comfort measures, notification of inadequate pain control measures, pain scale for assessing level of pain, pharmacologic pain management, relaxation techniques.

## 2022-08-26 NOTE — PROGRESS NOTES
[facility]                                                                                                                    PRE OP INSTRUCTIONS FOR  Juliana Carter        Date: 8/26/2022    Date of surgery: 8/31/22   Arrival Time: Hospital will call you between 5pm and 7pm the evening before with your final arrival time for surgery    Nothing by mouth (NPO) as instructed. __please follow special instruction sheet__________________________________________________________________    Take the following medications with a small sip of water on the morning of Surgery: gabapentin, use inhaler. Bring rescue inhaler. Diabetics may take evening dose of insulin but none after midnight. If you feel symptomatic or low blood sugar morning of surgery drink 1-2 ounces of apple juice only. Aspirin, Ibuprofen, Advil, Naproxen, Vitamin E and other Anti-inflammatory products should be stopped  before surgery  as directed by your physician. Take Tylenol only unless instructed otherwise by your surgeon. Check with your Doctor regarding stopping Plavix, Coumadin, Lovenox, Eliquis, Effient, or other blood thinners. Do not smoke,use illicit drugs and do not drink any alcoholic beverages 24 hours prior to surgery. You may brush your teeth the morning of surgery. DO NOT SWALLOW WATER    You MUST make arrangements for a responsible adult to take you home after your surgery. You will not be allowed to leave alone or drive yourself home. It is strongly suggested someone stay with you the first 24 hrs. Your surgery will be cancelled if you do not have a ride home. PEDIATRIC PATIENTS ONLY:  A parent/legal guardian must accompany a child scheduled for surgery and plan to stay at the hospital until the child is discharged. Please do not bring other children with you.     Please wear simple, loose fitting clothing to the hospital.  Do not bring valuables (money, credit cards, checkbooks, etc.) Do not wear any makeup (including no eye makeup) or nail polish on your fingers or toes. DO NOT wear any jewelry or piercings on day of surgery. All body piercing jewelry must be removed. Shower the night before surgery with __x_Antibacterial soap /MIKA WIPES____x____    TOTAL JOINT REPLACEMENT/HYSTERECTOMY PATIENTS ONLY---Remember to bring Blood Bank bracelet to the hospital on the day of surgery. If you have a Living Will and Durable Power of  for Healthcare, please bring in a copy. If appropriate bring crutches, inspirex, WALKER, CANE etc... Notify your Surgeon if you develop any illness between now and surgery time, cough, cold, fever, sore throat, nausea, vomiting, etc.  Please notify your surgeon if you experience dizziness, shortness of breath or blurred vision between now & the time of your surgery. If you have ___dentures, they will be removed before going to the OR; we will provide you a container. If you wear ___contact lenses or ___glasses, they will be removed; please bring a case for them. To provide excellent care visitors will be limited to 2 in the room at any given time. Please bring picture ID and insurance card. During flu season no children under the age of 15 are permitted in the hospital for the safety of all patients. Other please check in at the information desk/main lobby. Please wear a mask. Please call AMBULATORY CARE if you have any further questions.    [facility]     [address] all other ROS negative except as per HPI

## 2022-08-26 NOTE — CARE COORDINATION
LEE met with patient in PAT. Scheduled for Surgery on 8/31/22. She states she had same surgery on Left in November and she is familiar with process. She states he lives home with her  and 9year old disabled daughter. She states she lives in a 3 story home with a basement but after surgery she will stay on the first floor. There are 3 steps with railings on both sides to get into the house. She states she owns a rollator, walker, bedside commode and shower chair. Her PCP is Dr Alma Cardozo, pharmacy is [facility] on [address]. She states she has a history of [facility] and would like to use again. Referral to Tasneem at [facility]. Will need orders day of DC. Patient reports  will be here day of surgery and will transport home.

## 2022-08-28 LAB
MRSA CULTURE ONLY: NORMAL
URINE CULTURE, ROUTINE: NORMAL

## 2022-08-31 ENCOUNTER — APPOINTMENT (OUTPATIENT)
Dept: GENERAL RADIOLOGY | Age: 67
End: 2022-08-31
Attending: ORTHOPAEDIC SURGERY
Payer: COMMERCIAL

## 2022-08-31 ENCOUNTER — ANESTHESIA (OUTPATIENT)
Dept: OPERATING ROOM | Age: 67
End: 2022-08-31
Payer: COMMERCIAL

## 2022-08-31 ENCOUNTER — HOSPITAL ENCOUNTER (OUTPATIENT)
Age: 67
Setting detail: OUTPATIENT SURGERY
Discharge: HOME | End: 2022-08-31
Attending: ORTHOPAEDIC SURGERY | Admitting: ORTHOPAEDIC SURGERY
Payer: COMMERCIAL

## 2022-08-31 VITALS
OXYGEN SATURATION: 94 % | HEIGHT: 61 IN | DIASTOLIC BLOOD PRESSURE: 57 MMHG | HEART RATE: 74 BPM | BODY MASS INDEX: 37.76 KG/M2 | TEMPERATURE: 97 F | SYSTOLIC BLOOD PRESSURE: 118 MMHG | WEIGHT: 200 LBS | RESPIRATION RATE: 16 BRPM

## 2022-08-31 DIAGNOSIS — M17.11 PRIMARY OSTEOARTHRITIS OF RIGHT KNEE: Primary | ICD-10-CM

## 2022-08-31 DIAGNOSIS — M17.11 OSTEOARTHRITIS OF RIGHT KNEE, UNSPECIFIED OSTEOARTHRITIS TYPE: ICD-10-CM

## 2022-08-31 LAB
HCT VFR BLD CALC: 34.2 % (ref 34–48)
HEMOGLOBIN: 11.3 G/DL (ref 11.5–15.5)
METER GLUCOSE: 127 MG/DL (ref 74–99)

## 2022-08-31 PROCEDURE — 7100000010 HC PHASE II RECOVERY - FIRST 15 MIN: Performed by: ORTHOPAEDIC SURGERY

## 2022-08-31 PROCEDURE — 64447 NJX AA&/STRD FEMORAL NRV IMG: CPT | Performed by: ANESTHESIOLOGY

## 2022-08-31 PROCEDURE — 85014 HEMATOCRIT: CPT

## 2022-08-31 PROCEDURE — 2580000003 HC RX 258: Performed by: NURSE PRACTITIONER

## 2022-08-31 PROCEDURE — 7100000011 HC PHASE II RECOVERY - ADDTL 15 MIN: Performed by: ORTHOPAEDIC SURGERY

## 2022-08-31 PROCEDURE — 3600000015 HC SURGERY LEVEL 5 ADDTL 15MIN: Performed by: ORTHOPAEDIC SURGERY

## 2022-08-31 PROCEDURE — 6360000002 HC RX W HCPCS

## 2022-08-31 PROCEDURE — 64999 UNLISTED PX NERVOUS SYSTEM: CPT | Performed by: ANESTHESIOLOGY

## 2022-08-31 PROCEDURE — C1713 ANCHOR/SCREW BN/BN,TIS/BN: HCPCS | Performed by: ORTHOPAEDIC SURGERY

## 2022-08-31 PROCEDURE — 2500000003 HC RX 250 WO HCPCS: Performed by: ANESTHESIOLOGY

## 2022-08-31 PROCEDURE — 6360000002 HC RX W HCPCS: Performed by: ORTHOPAEDIC SURGERY

## 2022-08-31 PROCEDURE — 6360000002 HC RX W HCPCS: Performed by: ANESTHESIOLOGY

## 2022-08-31 PROCEDURE — 2580000003 HC RX 258: Performed by: ORTHOPAEDIC SURGERY

## 2022-08-31 PROCEDURE — 3600000005 HC SURGERY LEVEL 5 BASE: Performed by: ORTHOPAEDIC SURGERY

## 2022-08-31 PROCEDURE — 97165 OT EVAL LOW COMPLEX 30 MIN: CPT

## 2022-08-31 PROCEDURE — 88311 DECALCIFY TISSUE: CPT

## 2022-08-31 PROCEDURE — 97110 THERAPEUTIC EXERCISES: CPT | Performed by: PHYSICAL THERAPIST

## 2022-08-31 PROCEDURE — 6370000000 HC RX 637 (ALT 250 FOR IP): Performed by: NURSE PRACTITIONER

## 2022-08-31 PROCEDURE — 88305 TISSUE EXAM BY PATHOLOGIST: CPT

## 2022-08-31 PROCEDURE — 2580000003 HC RX 258: Performed by: ANESTHESIOLOGY

## 2022-08-31 PROCEDURE — 97161 PT EVAL LOW COMPLEX 20 MIN: CPT | Performed by: PHYSICAL THERAPIST

## 2022-08-31 PROCEDURE — 85018 HEMOGLOBIN: CPT

## 2022-08-31 PROCEDURE — 6360000002 HC RX W HCPCS: Performed by: NURSE PRACTITIONER

## 2022-08-31 PROCEDURE — A4217 STERILE WATER/SALINE, 500 ML: HCPCS | Performed by: ORTHOPAEDIC SURGERY

## 2022-08-31 PROCEDURE — C1776 JOINT DEVICE (IMPLANTABLE): HCPCS | Performed by: ORTHOPAEDIC SURGERY

## 2022-08-31 PROCEDURE — 6370000000 HC RX 637 (ALT 250 FOR IP): Performed by: ORTHOPAEDIC SURGERY

## 2022-08-31 PROCEDURE — 2709999900 HC NON-CHARGEABLE SUPPLY: Performed by: ORTHOPAEDIC SURGERY

## 2022-08-31 PROCEDURE — 3700000000 HC ANESTHESIA ATTENDED CARE: Performed by: ORTHOPAEDIC SURGERY

## 2022-08-31 PROCEDURE — 97535 SELF CARE MNGMENT TRAINING: CPT

## 2022-08-31 PROCEDURE — 3700000001 HC ADD 15 MINUTES (ANESTHESIA): Performed by: ORTHOPAEDIC SURGERY

## 2022-08-31 PROCEDURE — 7100000001 HC PACU RECOVERY - ADDTL 15 MIN: Performed by: ORTHOPAEDIC SURGERY

## 2022-08-31 PROCEDURE — 2500000003 HC RX 250 WO HCPCS

## 2022-08-31 PROCEDURE — 82962 GLUCOSE BLOOD TEST: CPT

## 2022-08-31 PROCEDURE — 27447 TOTAL KNEE ARTHROPLASTY: CPT | Performed by: ORTHOPAEDIC SURGERY

## 2022-08-31 PROCEDURE — 36415 COLL VENOUS BLD VENIPUNCTURE: CPT

## 2022-08-31 PROCEDURE — 73560 X-RAY EXAM OF KNEE 1 OR 2: CPT

## 2022-08-31 PROCEDURE — 2500000003 HC RX 250 WO HCPCS: Performed by: NURSE PRACTITIONER

## 2022-08-31 PROCEDURE — 7100000000 HC PACU RECOVERY - FIRST 15 MIN: Performed by: ORTHOPAEDIC SURGERY

## 2022-08-31 PROCEDURE — 97116 GAIT TRAINING THERAPY: CPT | Performed by: PHYSICAL THERAPIST

## 2022-08-31 DEVICE — FULL DOSE BONE CEMENT, 10 PACK CATALOG NUMBER IS 6191-1-010
Type: IMPLANTABLE DEVICE | Status: FUNCTIONAL
Brand: SIMPLEX

## 2022-08-31 DEVICE — LEGION PS HIGH FLEX XLPE SZ 3-4 10MM
Type: IMPLANTABLE DEVICE | Status: FUNCTIONAL
Brand: LEGION

## 2022-08-31 DEVICE — GENESIS II NON-POROUS TIBIAL                                    BASEPLATE SIZE 4 RIGHT
Type: IMPLANTABLE DEVICE | Status: FUNCTIONAL
Brand: GENESIS II

## 2022-08-31 DEVICE — GENESIS II RESURFACING PATELLAR                                    PROSTHESIS  29MM
Type: IMPLANTABLE DEVICE | Status: FUNCTIONAL
Brand: GENESIS II

## 2022-08-31 DEVICE — KNEE K1 TOT HEMI STD CEM IMPL CAPPED K1 SN: Type: IMPLANTABLE DEVICE | Status: FUNCTIONAL

## 2022-08-31 DEVICE — LEGION POSTERIOR STABILIZED                                    OXINIUM FEMORAL SIZE 4 RIGHT
Type: IMPLANTABLE DEVICE | Status: FUNCTIONAL
Brand: LEGION

## 2022-08-31 RX ORDER — SERTRALINE HYDROCHLORIDE 100 MG/1
100 TABLET, FILM COATED ORAL NIGHTLY
Status: CANCELLED | OUTPATIENT
Start: 2022-08-31

## 2022-08-31 RX ORDER — DEXAMETHASONE SODIUM PHOSPHATE 10 MG/ML
8 INJECTION INTRAMUSCULAR; INTRAVENOUS ONCE
Status: COMPLETED | OUTPATIENT
Start: 2022-08-31 | End: 2022-08-31

## 2022-08-31 RX ORDER — PANTOPRAZOLE SODIUM 40 MG/1
40 TABLET, DELAYED RELEASE ORAL
Status: CANCELLED | OUTPATIENT
Start: 2022-09-01

## 2022-08-31 RX ORDER — PROPOFOL 10 MG/ML
INJECTION, EMULSION INTRAVENOUS CONTINUOUS PRN
Status: DISCONTINUED | OUTPATIENT
Start: 2022-08-31 | End: 2022-08-31 | Stop reason: SDUPTHER

## 2022-08-31 RX ORDER — FENTANYL CITRATE 50 UG/ML
25 INJECTION, SOLUTION INTRAMUSCULAR; INTRAVENOUS EVERY 5 MIN PRN
Status: DISCONTINUED | OUTPATIENT
Start: 2022-08-31 | End: 2022-08-31 | Stop reason: HOSPADM

## 2022-08-31 RX ORDER — MEPERIDINE HYDROCHLORIDE 25 MG/ML
12.5 INJECTION INTRAMUSCULAR; INTRAVENOUS; SUBCUTANEOUS ONCE
Status: COMPLETED | OUTPATIENT
Start: 2022-08-31 | End: 2022-08-31

## 2022-08-31 RX ORDER — HYDRALAZINE HYDROCHLORIDE 20 MG/ML
5 INJECTION INTRAMUSCULAR; INTRAVENOUS
Status: DISCONTINUED | OUTPATIENT
Start: 2022-08-31 | End: 2022-08-31 | Stop reason: HOSPADM

## 2022-08-31 RX ORDER — SODIUM CHLORIDE 0.9 % (FLUSH) 0.9 %
5-40 SYRINGE (ML) INJECTION PRN
Status: DISCONTINUED | OUTPATIENT
Start: 2022-08-31 | End: 2022-08-31 | Stop reason: HOSPADM

## 2022-08-31 RX ORDER — DEXTROSE AND SODIUM CHLORIDE 5; .45 G/100ML; G/100ML
INJECTION, SOLUTION INTRAVENOUS CONTINUOUS
Status: CANCELLED | OUTPATIENT
Start: 2022-08-31

## 2022-08-31 RX ORDER — ROSUVASTATIN CALCIUM 20 MG/1
40 TABLET, COATED ORAL EVERY EVENING
Status: CANCELLED | OUTPATIENT
Start: 2022-08-31

## 2022-08-31 RX ORDER — ONDANSETRON 4 MG/1
4 TABLET, ORALLY DISINTEGRATING ORAL EVERY 8 HOURS PRN
Status: DISCONTINUED | OUTPATIENT
Start: 2022-08-31 | End: 2022-08-31 | Stop reason: HOSPADM

## 2022-08-31 RX ORDER — ROPIVACAINE HYDROCHLORIDE 5 MG/ML
INJECTION, SOLUTION EPIDURAL; INFILTRATION; PERINEURAL
Status: COMPLETED | OUTPATIENT
Start: 2022-08-31 | End: 2022-08-31

## 2022-08-31 RX ORDER — MIDAZOLAM HYDROCHLORIDE 1 MG/ML
INJECTION INTRAMUSCULAR; INTRAVENOUS
Status: COMPLETED
Start: 2022-08-31 | End: 2022-08-31

## 2022-08-31 RX ORDER — CELECOXIB 100 MG/1
100 CAPSULE ORAL ONCE
Status: COMPLETED | OUTPATIENT
Start: 2022-08-31 | End: 2022-08-31

## 2022-08-31 RX ORDER — FENTANYL CITRATE 50 UG/ML
50 INJECTION, SOLUTION INTRAMUSCULAR; INTRAVENOUS ONCE
Status: COMPLETED | OUTPATIENT
Start: 2022-08-31 | End: 2022-08-31

## 2022-08-31 RX ORDER — OXYBUTYNIN CHLORIDE 5 MG/1
5 TABLET ORAL 3 TIMES DAILY
Status: CANCELLED | OUTPATIENT
Start: 2022-08-31

## 2022-08-31 RX ORDER — SODIUM CHLORIDE 9 MG/ML
INJECTION, SOLUTION INTRAVENOUS PRN
Status: DISCONTINUED | OUTPATIENT
Start: 2022-08-31 | End: 2022-08-31 | Stop reason: HOSPADM

## 2022-08-31 RX ORDER — ACETAMINOPHEN 500 MG
1000 TABLET ORAL ONCE
Status: COMPLETED | OUTPATIENT
Start: 2022-08-31 | End: 2022-08-31

## 2022-08-31 RX ORDER — FENTANYL CITRATE 50 UG/ML
INJECTION, SOLUTION INTRAMUSCULAR; INTRAVENOUS
Status: COMPLETED
Start: 2022-08-31 | End: 2022-08-31

## 2022-08-31 RX ORDER — SCOLOPAMINE TRANSDERMAL SYSTEM 1 MG/1
1 PATCH, EXTENDED RELEASE TRANSDERMAL
Status: DISCONTINUED | OUTPATIENT
Start: 2022-08-31 | End: 2022-08-31 | Stop reason: HOSPADM

## 2022-08-31 RX ORDER — ONDANSETRON 2 MG/ML
4 INJECTION INTRAMUSCULAR; INTRAVENOUS EVERY 6 HOURS PRN
Status: DISCONTINUED | OUTPATIENT
Start: 2022-08-31 | End: 2022-08-31 | Stop reason: HOSPADM

## 2022-08-31 RX ORDER — ROPIVACAINE HYDROCHLORIDE 5 MG/ML
INJECTION, SOLUTION EPIDURAL; INFILTRATION; PERINEURAL
Status: COMPLETED
Start: 2022-08-31 | End: 2022-08-31

## 2022-08-31 RX ORDER — OXYCODONE HYDROCHLORIDE AND ACETAMINOPHEN 5; 325 MG/1; MG/1
1 TABLET ORAL EVERY 6 HOURS PRN
Qty: 28 TABLET | Refills: 0 | Status: SHIPPED | OUTPATIENT
Start: 2022-08-31 | End: 2022-09-15 | Stop reason: SDUPTHER

## 2022-08-31 RX ORDER — LIDOCAINE HYDROCHLORIDE 20 MG/ML
INJECTION, SOLUTION EPIDURAL; INFILTRATION; INTRACAUDAL; PERINEURAL PRN
Status: DISCONTINUED | OUTPATIENT
Start: 2022-08-31 | End: 2022-08-31 | Stop reason: SDUPTHER

## 2022-08-31 RX ORDER — DIPHENHYDRAMINE HYDROCHLORIDE 50 MG/ML
12.5 INJECTION INTRAMUSCULAR; INTRAVENOUS
Status: DISCONTINUED | OUTPATIENT
Start: 2022-08-31 | End: 2022-08-31 | Stop reason: HOSPADM

## 2022-08-31 RX ORDER — SODIUM CHLORIDE 9 MG/ML
INJECTION, SOLUTION INTRAVENOUS CONTINUOUS
Status: DISCONTINUED | OUTPATIENT
Start: 2022-08-31 | End: 2022-08-31 | Stop reason: HOSPADM

## 2022-08-31 RX ORDER — LISINOPRIL AND HYDROCHLOROTHIAZIDE 20; 12.5 MG/1; MG/1
1 TABLET ORAL DAILY
Status: CANCELLED | OUTPATIENT
Start: 2022-08-31

## 2022-08-31 RX ORDER — SODIUM CHLORIDE 0.9 % (FLUSH) 0.9 %
5-40 SYRINGE (ML) INJECTION EVERY 12 HOURS SCHEDULED
Status: DISCONTINUED | OUTPATIENT
Start: 2022-08-31 | End: 2022-08-31 | Stop reason: HOSPADM

## 2022-08-31 RX ORDER — ONDANSETRON 4 MG/1
4 TABLET, ORALLY DISINTEGRATING ORAL EVERY 8 HOURS PRN
Qty: 20 TABLET | Refills: 0 | Status: SHIPPED | OUTPATIENT
Start: 2022-08-31 | End: 2022-09-07

## 2022-08-31 RX ORDER — MONTELUKAST SODIUM 10 MG/1
10 TABLET ORAL NIGHTLY
Status: CANCELLED | OUTPATIENT
Start: 2022-08-31

## 2022-08-31 RX ORDER — BUDESONIDE AND FORMOTEROL FUMARATE DIHYDRATE 160; 4.5 UG/1; UG/1
2 AEROSOL RESPIRATORY (INHALATION) 2 TIMES DAILY
Status: CANCELLED | OUTPATIENT
Start: 2022-08-31

## 2022-08-31 RX ORDER — ASPIRIN 325 MG
325 TABLET, DELAYED RELEASE (ENTERIC COATED) ORAL 2 TIMES DAILY
Qty: 60 TABLET | Refills: 0 | Status: SHIPPED | OUTPATIENT
Start: 2022-08-31 | End: 2022-10-13

## 2022-08-31 RX ORDER — ALBUTEROL SULFATE 90 UG/1
2 AEROSOL, METERED RESPIRATORY (INHALATION) EVERY 6 HOURS PRN
Status: CANCELLED | OUTPATIENT
Start: 2022-08-31

## 2022-08-31 RX ORDER — ROPIVACAINE HYDROCHLORIDE 5 MG/ML
30 INJECTION, SOLUTION EPIDURAL; INFILTRATION; PERINEURAL
Status: DISCONTINUED | OUTPATIENT
Start: 2022-08-31 | End: 2022-08-31

## 2022-08-31 RX ORDER — MELOXICAM 7.5 MG/1
7.5 TABLET ORAL DAILY
Status: CANCELLED | OUTPATIENT
Start: 2022-08-31

## 2022-08-31 RX ORDER — FLUTICASONE PROPIONATE 50 MCG
1 SPRAY, SUSPENSION (ML) NASAL DAILY
Status: CANCELLED | OUTPATIENT
Start: 2022-08-31

## 2022-08-31 RX ORDER — LEVOTHYROXINE SODIUM 0.07 MG/1
75 TABLET ORAL DAILY
Status: CANCELLED | OUTPATIENT
Start: 2022-08-31

## 2022-08-31 RX ORDER — FAMOTIDINE 10 MG/ML
20 INJECTION, SOLUTION INTRAVENOUS ONCE
Status: COMPLETED | OUTPATIENT
Start: 2022-08-31 | End: 2022-08-31

## 2022-08-31 RX ORDER — VANCOMYCIN HYDROCHLORIDE 1 G/20ML
INJECTION, POWDER, LYOPHILIZED, FOR SOLUTION INTRAVENOUS PRN
Status: DISCONTINUED | OUTPATIENT
Start: 2022-08-31 | End: 2022-08-31 | Stop reason: ALTCHOICE

## 2022-08-31 RX ORDER — EZETIMIBE 10 MG/1
10 TABLET ORAL DAILY
Status: CANCELLED | OUTPATIENT
Start: 2022-08-31

## 2022-08-31 RX ORDER — DULOXETIN HYDROCHLORIDE 60 MG/1
60 CAPSULE, DELAYED RELEASE ORAL NIGHTLY
Status: CANCELLED | OUTPATIENT
Start: 2022-08-31

## 2022-08-31 RX ORDER — MIDAZOLAM HYDROCHLORIDE 1 MG/ML
2 INJECTION INTRAMUSCULAR; INTRAVENOUS ONCE
Status: COMPLETED | OUTPATIENT
Start: 2022-08-31 | End: 2022-08-31

## 2022-08-31 RX ORDER — EPHEDRINE SULFATE/0.9% NACL/PF 50 MG/5 ML
SYRINGE (ML) INTRAVENOUS PRN
Status: DISCONTINUED | OUTPATIENT
Start: 2022-08-31 | End: 2022-08-31 | Stop reason: SDUPTHER

## 2022-08-31 RX ORDER — CETIRIZINE HYDROCHLORIDE 10 MG/1
10 TABLET ORAL DAILY
Status: CANCELLED | OUTPATIENT
Start: 2022-08-31

## 2022-08-31 RX ORDER — PROCHLORPERAZINE EDISYLATE 5 MG/ML
5 INJECTION INTRAMUSCULAR; INTRAVENOUS
Status: DISCONTINUED | OUTPATIENT
Start: 2022-08-31 | End: 2022-08-31 | Stop reason: HOSPADM

## 2022-08-31 RX ORDER — CEFAZOLIN 2 G/1
INJECTION, POWDER, FOR SOLUTION INTRAMUSCULAR; INTRAVENOUS
Status: DISCONTINUED
Start: 2022-08-31 | End: 2022-08-31 | Stop reason: HOSPADM

## 2022-08-31 RX ORDER — MORPHINE SULFATE 2 MG/ML
2 INJECTION, SOLUTION INTRAMUSCULAR; INTRAVENOUS
Status: DISCONTINUED | OUTPATIENT
Start: 2022-08-31 | End: 2022-08-31 | Stop reason: HOSPADM

## 2022-08-31 RX ORDER — BUPROPION HYDROCHLORIDE 150 MG/1
150 TABLET ORAL NIGHTLY
Status: CANCELLED | OUTPATIENT
Start: 2022-08-31

## 2022-08-31 RX ORDER — OXYCODONE HYDROCHLORIDE 5 MG/1
10 TABLET ORAL EVERY 4 HOURS PRN
Status: DISCONTINUED | OUTPATIENT
Start: 2022-08-31 | End: 2022-08-31 | Stop reason: HOSPADM

## 2022-08-31 RX ORDER — ACETAMINOPHEN 325 MG/1
650 TABLET ORAL EVERY 6 HOURS
Status: CANCELLED | OUTPATIENT
Start: 2022-09-01

## 2022-08-31 RX ORDER — METOCLOPRAMIDE HYDROCHLORIDE 5 MG/ML
10 INJECTION INTRAMUSCULAR; INTRAVENOUS ONCE
Status: COMPLETED | OUTPATIENT
Start: 2022-08-31 | End: 2022-08-31

## 2022-08-31 RX ORDER — GABAPENTIN 600 MG/1
600 TABLET ORAL 3 TIMES DAILY
Status: CANCELLED | OUTPATIENT
Start: 2022-08-31

## 2022-08-31 RX ORDER — LABETALOL HYDROCHLORIDE 5 MG/ML
5 INJECTION, SOLUTION INTRAVENOUS
Status: DISCONTINUED | OUTPATIENT
Start: 2022-08-31 | End: 2022-08-31 | Stop reason: HOSPADM

## 2022-08-31 RX ORDER — BUPIVACAINE HYDROCHLORIDE 7.5 MG/ML
INJECTION, SOLUTION EPIDURAL; RETROBULBAR
Status: COMPLETED | OUTPATIENT
Start: 2022-08-31 | End: 2022-08-31

## 2022-08-31 RX ADMIN — PHENYLEPHRINE HYDROCHLORIDE 50 MCG: 10 INJECTION INTRAVENOUS at 11:23

## 2022-08-31 RX ADMIN — SODIUM CHLORIDE: 900 INJECTION, SOLUTION INTRAVENOUS at 10:35

## 2022-08-31 RX ADMIN — Medication 50 MG: at 10:26

## 2022-08-31 RX ADMIN — DEXAMETHASONE SODIUM PHOSPHATE 8 MG: 10 INJECTION INTRAMUSCULAR; INTRAVENOUS at 08:31

## 2022-08-31 RX ADMIN — PHENYLEPHRINE HYDROCHLORIDE 50 MCG: 10 INJECTION INTRAVENOUS at 11:40

## 2022-08-31 RX ADMIN — MIDAZOLAM HYDROCHLORIDE 2 MG: 1 INJECTION, SOLUTION INTRAMUSCULAR; INTRAVENOUS at 09:36

## 2022-08-31 RX ADMIN — ROPIVACAINE HYDROCHLORIDE 30 ML: 5 INJECTION, SOLUTION EPIDURAL; INFILTRATION; PERINEURAL at 09:37

## 2022-08-31 RX ADMIN — SODIUM CHLORIDE: 900 INJECTION, SOLUTION INTRAVENOUS at 08:30

## 2022-08-31 RX ADMIN — ACETAMINOPHEN 1000 MG: 500 TABLET ORAL at 08:31

## 2022-08-31 RX ADMIN — METOCLOPRAMIDE 10 MG: 5 INJECTION, SOLUTION INTRAMUSCULAR; INTRAVENOUS at 10:39

## 2022-08-31 RX ADMIN — PHENYLEPHRINE HYDROCHLORIDE 50 MCG: 10 INJECTION INTRAVENOUS at 11:08

## 2022-08-31 RX ADMIN — FAMOTIDINE 20 MG: 10 INJECTION, SOLUTION INTRAVENOUS at 08:31

## 2022-08-31 RX ADMIN — PROPOFOL INJECTABLE EMULSION 20 MG: 10 INJECTION, EMULSION INTRAVENOUS at 11:34

## 2022-08-31 RX ADMIN — PHENYLEPHRINE HYDROCHLORIDE 100 MCG: 10 INJECTION INTRAVENOUS at 10:58

## 2022-08-31 RX ADMIN — Medication 10 MG: at 10:52

## 2022-08-31 RX ADMIN — CEFAZOLIN 2000 MG: 2 INJECTION, POWDER, FOR SOLUTION INTRAMUSCULAR; INTRAVENOUS at 10:24

## 2022-08-31 RX ADMIN — ROPIVACAINE HYDROCHLORIDE 30 ML: 5 INJECTION, SOLUTION EPIDURAL; INFILTRATION; PERINEURAL at 09:39

## 2022-08-31 RX ADMIN — Medication 10 MG: at 10:46

## 2022-08-31 RX ADMIN — ROPIVACAINE HYDROCHLORIDE 15 ML: 5 INJECTION, SOLUTION EPIDURAL; INFILTRATION; PERINEURAL at 09:37

## 2022-08-31 RX ADMIN — Medication 10 MG: at 10:42

## 2022-08-31 RX ADMIN — PHENYLEPHRINE HYDROCHLORIDE 100 MCG: 10 INJECTION INTRAVENOUS at 11:14

## 2022-08-31 RX ADMIN — PROPOFOL INJECTABLE EMULSION 50 MCG/KG/MIN: 10 INJECTION, EMULSION INTRAVENOUS at 10:26

## 2022-08-31 RX ADMIN — MEPERIDINE HYDROCHLORIDE 12.5 MG: 25 INJECTION INTRAMUSCULAR; INTRAVENOUS; SUBCUTANEOUS at 14:36

## 2022-08-31 RX ADMIN — BUPIVACAINE HYDROCHLORIDE 14 MG: 7.5 INJECTION, SOLUTION EPIDURAL; RETROBULBAR at 10:12

## 2022-08-31 RX ADMIN — MIDAZOLAM HYDROCHLORIDE 2 MG: 1 INJECTION INTRAMUSCULAR; INTRAVENOUS at 09:36

## 2022-08-31 RX ADMIN — PHENYLEPHRINE HYDROCHLORIDE 100 MCG: 10 INJECTION INTRAVENOUS at 10:36

## 2022-08-31 RX ADMIN — FENTANYL CITRATE 50 MCG: 50 INJECTION, SOLUTION INTRAMUSCULAR; INTRAVENOUS at 10:16

## 2022-08-31 RX ADMIN — WATER 2000 MG: 1 INJECTION INTRAMUSCULAR; INTRAVENOUS; SUBCUTANEOUS at 13:26

## 2022-08-31 RX ADMIN — CELECOXIB 100 MG: 100 CAPSULE ORAL at 08:31

## 2022-08-31 ASSESSMENT — PAIN SCALES - GENERAL
PAINLEVEL_OUTOF10: 0
PAINLEVEL_OUTOF10: 7
PAINLEVEL_OUTOF10: 0
PAINLEVEL_OUTOF10: 0

## 2022-08-31 ASSESSMENT — PAIN DESCRIPTION - PAIN TYPE: TYPE: SURGICAL PAIN

## 2022-08-31 ASSESSMENT — PAIN DESCRIPTION - ORIENTATION: ORIENTATION: RIGHT

## 2022-08-31 ASSESSMENT — PAIN DESCRIPTION - DESCRIPTORS: DESCRIPTORS: ACHING;DISCOMFORT

## 2022-08-31 ASSESSMENT — PAIN DESCRIPTION - LOCATION: LOCATION: KNEE

## 2022-08-31 ASSESSMENT — PAIN - FUNCTIONAL ASSESSMENT: PAIN_FUNCTIONAL_ASSESSMENT: NONE - DENIES PAIN

## 2022-08-31 NOTE — CARE COORDINATION
8/31/2022: SS Note:  SS Consult for post-op d/c planning and HHC order noted, met with pt in ACC, d/c plan confirmed for pt to return home today from [facility] with [facility], referral made and agency liaison accepted referral for start of care tomorrow, pt brought her rollator walker but also has a front w/w to use at home if recommended and dme needed, [facility] notified. Electronically signed by LILA Ballard on 8/31/2022 at 1:58 PM

## 2022-08-31 NOTE — PROGRESS NOTES
Patient worked with both pt/ot and is anxious to get home, discharge instructions given and excorted patient out to care with no problem.

## 2022-08-31 NOTE — ANESTHESIA POSTPROCEDURE EVALUATION
Department of Anesthesiology  Postprocedure Note    Patient: Migel Meyers  MRN: [id number]  YOB: 1955  Date of evaluation: 8/31/2022      Procedure Summary     Date: 08/31/22 Room / Location: [address] [place] [address] / [address] [place] [address]    Anesthesia Start: 1005 Anesthesia Stop: 200    Procedure: RIGHT KNEE TOTAL ARTHROPLASTY [facility] AND NEPHEW) (Right: Knee) Diagnosis:       Osteoarthritis of right knee, unspecified osteoarthritis type      (Osteoarthritis of right knee, unspecified osteoarthritis type [M17.11])    Surgeons: Monique Nelson DO Responsible Provider: Chano Erwin DO    Anesthesia Type: general, spinal, regional ASA Status: 4          Anesthesia Type: No value filed. Hillary Phase I: Hillary Score: 8    Hillary Phase II:        Anesthesia Post Evaluation    Patient location during evaluation: bedside  Patient participation: complete - patient participated  Level of consciousness: awake  Pain score: 2  Airway patency: patent  Nausea & Vomiting: no vomiting and no nausea  Complications: no  Cardiovascular status: hemodynamically stable  Respiratory status: acceptable  Hydration status: stable  Comments: Seen and examined. Progressing well. No questions or concerns.   Multimodal analgesia pain management approach

## 2022-08-31 NOTE — PROGRESS NOTES
[address] [facility]  [facility] [place]. [place]        Date:2022                                                  Patient Name: Smitha Dumont    MRN: [id number]    : 1955    Room: OR POOL/NONE      Evaluating OT: Dre Mariano OTR/L; [id number]     Referring Provider and Specific Provider Orders/Date:      22 1430  OT eval and treat  Start:  22 1430,   End:  22 1430,   ONE TIME,   Standing Count:  1 Occurrences,   [address],       Placement Recommendation: Home with no skilled occupational therapy needed after discharge from inpatient. Diagnosis:   1. Primary osteoarthritis of right knee    2.  Osteoarthritis of right knee, unspecified osteoarthritis type         Surgery: R TKA      Pertinent Medical History:       Past Medical History:   Diagnosis Date    Arthritis     back, cervical neck    Asthma     Breast mass     fibercystic     Chronic sinusitis     Colon polyps     COPD (chronic obstructive pulmonary disease) (HCC)     controlled with inhalers    Depression     Diabetes mellitus ([address]     Diabetic polyneuropathy ([address]     Diverticulitis     Diverticulosis     GERD (gastroesophageal reflux disease)     Hyperlipidemia     Hypertension     Mitral valve prolapse     f/u w/ PCP only - no symptoms    Neuropathy     Overactive thyroid gland     Shortness of breath     Sleep apnea     Thyroid disease          Past Surgical History:   Procedure Laterality Date    ABDOMINAL EXPLORATION SURGERY      APPENDECTOMY  1968    BRONCHOSCOPY  Oct 2013    Bronchoscopy and Mediastinoscopy    CARPAL TUNNEL RELEASE      bilat    CHOLECYSTECTOMY, LAPAROSCOPIC N/A 2020    CHOLECYSTECTOMY LAPAROSCOPIC WITH IOC performed by Merlin Burkitt, MD at [address] [place] [address] Dr ARTHROSCOPY Left 2021    LEFT KNEE ARTHROSCOPY, MEDIAL MENISCECTOMY AND DEBRIDEMENT performed by Jose Mai Nickelmo Jaskaran at [facility]  10/1995    left    TONSILLECTOMY  1972    TOTAL KNEE ARTHROPLASTY Left 11/10/2021    LEFT KNEE TOTAL ARTHROPLASTY [facility]) performed by Saleem Starks DO at [address] [facility] N/A 1/23/2020    EGD BIOPSY performed by Wendi Fuentes MD at [facility] ENDOSCOPY      Precautions:  Fall Risk, full weight bearing: R LE d/t TKA     Assessment of current deficits:     [x] Functional mobility  [x]ADLs  [x] Strength               []Cognition    [x] Functional transfers   [x] IADLs         [] Safety Awareness   [x]Endurance    [] Fine Coordination              [x] Balance      [] Vision/perception   []Sensation     []Gross Motor Coordination  [x] ROM  [] Delirium                   [] Motor Control     OT PLAN OF CARE   OT POC based on physician orders, patient diagnosis and results of clinical assessment    Frequency/Duration 1-3 days/wk for 2 weeks PRN     Specific OT Treatment Interventions to include: none, discharging after evaluation     Recommended Adaptive Equipment:  leg  provided     Home Living: with spouse and 9year old daughter; single family home, 2 story, plans to reside on 1st floor, tub shower       Equipment owned: tub transfer bench, rollator, standard and wheeled walker, reacher    Prior Level of Function: Independent with ADLs , Independent with IADLs; ambulated with no device    Driving: yes  Interest: 1 dog and 3 outdoor cats    Pain Level: 7/10 pain in R knee; Nursing notified.       Cognition: A&O: 4/4; Follows 3 step directions   Memory: good    Sequencing: good    Problem solving: good    Judgement/safety: good     [facility]   AM-PAC Daily Activity Inpatient   How much help for putting on and taking off regular lower body clothing?: A Little  How much help for Bathing?: A Little  How much help for Toileting?: A Little  How much help for putting on and taking off regular upper body clothing?: A Little  How much help for taking care of personal grooming?: A Little  How much help for eating meals?: None  AM-PAC Inpatient Daily Activity Raw Score: 19  AM-PAC Inpatient ADL T-Scale Score : 40.22  ADL Inpatient CMS 0-100% Score: 42.8  ADL Inpatient CMS G-Code Modifier : CK     Functional Assessment:    Initial Eval Status  Date: 8/31/22   Feeding Independent    Grooming Supervision    UB Dressing Supervision to [facility] gown and don bra and shirt while seated EOB    LB Dressing Supervision for lower body clothing management before and after toileting. Supervision to thread feet through shorts while seated EOB then stood to bring garment up around hips an waist.   Supervision to [facility] socks and don shocks and shoes while seated EOB. Bathing Minimal Assist    Toileting Supervision for hygiene and to stand at sink level to wash and dry hands. Bed Mobility  Supine to sit: N/T as pt was seated on the commode. Sit to supine:N/T as pt was seated EOB. Leg  provided. Functional Transfers Supervision from low commode with minimal verbal cues to use grab bar for safe commode transfer. Supervision for transfer to and from edge of bed while dressing. Transfer training with verbal cues for hand placement throughout session to improve safety. Functional Mobility Supervision with standard walker to improve balance from bathroom to room, verbal cues for walker sequence and safety.     Balance Sitting:     Static: good     Dynamic: fair plus  Standing: fair plus with standard walker   Activity Tolerance Fair plus   Visual/  Perceptual Glasses: yes            Hand Dominance: right      AROM (PROM) Strength Additional Info:    RUE  WFL 5/5 good  and wfl FMC/dexterity noted during ADL tasks     LUE WFL 5/5 good  and wfl FMC/dexterity noted during ADL tasks       Hearing: WFL   Sensation:  No c/o numbness or tingling  Tone: WFL   Edema: yes, surgical extremity     Comments: Upon arrival the patient was seated on commode .  At end of session, patient was seated EOB with call light and phone within reach, all lines and tubes intact. Spouse present. Overall patient demonstrated decreased independence and safety during completion of ADL/functional transfer/mobility tasks. Pt would benefit from continued skilled OT to increase safety and independence with completion of ADL/IADL tasks for functional independence and quality of life. Treatment: OT treatment provided this date includes:   Instruction/training on safety and adapted techniques for completion of ADLs   Instruction/training on safe functional mobility/transfer techniques   Instruction/training on energy conservation/work simplification for completion of ADLs   Proper Positioning/Alignment    Instruction/training in weight bearing status and walker sequence   Instruction/training in use of adaptive equipment for lower body dressing, demo provided    Instruction/training in lower body dressing techniques    Instruction/training in bathing/Aquacel dressing (water proof)   Instruction/training in car transfer technique and seat positioning    Rehab Potential: Good for established goals. Patient / Family Goal: return home       Patient and/or family were instructed on functional diagnosis, prognosis/goals and OT plan of care. Demonstrated good understanding.      Eval Complexity: Low    Time In: 3:23pm  Time Out: 3:54pm    Total Treatment Time: 11      Min Units   OT Eval Low 97165  X  1    OT Eval Medium 82035      OT Eval High 79609      OT Re-Eval 35798            ADL/Self Care 38436  11  1    Therapeutic Activities 49828       Therapeutic Ex 68341       Orthotic Management 84415       Manual 63460     Neuro Re-Ed 74150       Non-Billable Time        Evaluation Time additionally includes thorough review of current medical information, gathering information on past medical history/social history and prior level of function, interpretation of standardized testing/informal observation of tasks, assessment of data and development of plan of care and goals.         Evaluating OT: Taryn Cano OTR/L; [id number]

## 2022-08-31 NOTE — PROGRESS NOTES
925 to pacu for right adductor canal block.  Connected to all monitors and O2 applied at 3 liters nasal canula  935 time out performed and premedicated per orders  937 block started by dr Marcela Oro using ultrasound  939 30 ml 0.5% ropivacaine injected to right adductor canal goldie well. sleeping

## 2022-08-31 NOTE — H&P
Updated H&P    Chief Complaint   Patient presents with    Knee Pain       Right Knee F/U, pending surgery on 08/31/2022         Subjective:     Patient ID: Delmy Feliz is a 73 y.o..  female     Knee Pain  Patient follows up today for her right  knee pain. She would like to discuss surgical intervention.  Conservative treatments have failed and it is affecting her ADLs     Past Medical History        Past Medical History:   Diagnosis Date    Arthritis       back, cervical neck    Asthma      Breast mass       fibercystic     Chronic sinusitis      Colon polyps      COPD (chronic obstructive pulmonary disease) ([address]       controlled with inhalers    Depression      Diabetes mellitus ([address]      Diabetic polyneuropathy ([address]      Diverticulitis      Diverticulosis      GERD (gastroesophageal reflux disease)      Hyperlipidemia      Hypertension      Mitral valve prolapse       f/u w/ PCP only - no symptoms    Neuropathy      Overactive thyroid gland      Shortness of breath      Sleep apnea      Thyroid disease           Past Surgical History         Past Surgical History:   Procedure Laterality Date    ABDOMINAL EXPLORATION SURGERY        APPENDECTOMY   1968    BRONCHOSCOPY   Oct 2013     Bronchoscopy and Mediastinoscopy    CARPAL TUNNEL RELEASE         bilat    CHOLECYSTECTOMY, LAPAROSCOPIC N/A 5/12/2020     CHOLECYSTECTOMY LAPAROSCOPIC WITH IOC performed by Kennedy Severin, MD at [address] [place] [address] ARTHROSCOPY Left 8/4/2021     LEFT KNEE ARTHROSCOPY, MEDIAL MENISCECTOMY AND DEBRIDEMENT performed by Hector Thomas DO at [facility]   10/1995     left    [address] Left 11/10/2021     LEFT KNEE TOTAL ARTHROPLASTY [facility]) performed by Hector Thomas DO at [address] [place] [address] N/A 1/23/2020     EGD BIOPSY performed by Kennedy Severin, MD at [facility] ENDOSCOPY           Current Medication      Current Outpatient Medications:     budesonide-formoterol (SYMBICORT) 160-4.5 MCG/ACT AERO, Inhale 2 puffs into the lungs 2 times daily, Disp: , Rfl:     buPROPion (WELLBUTRIN XL) 150 MG extended release tablet, Take 150 mg by mouth every morning, Disp: , Rfl:     montelukast (SINGULAIR) 10 MG tablet, nightly , Disp: , Rfl:     lisinopril-hydrochlorothiazide (PRINZIDE;ZESTORETIC) 20-12.5 MG per tablet, Take 1 tablet by mouth daily, Disp: , Rfl:     DULoxetine (CYMBALTA) 60 MG extended release capsule, Take 60 mg by mouth daily, Disp: , Rfl:     cetirizine (ZYRTEC) 10 MG tablet, Take 10 mg by mouth daily, Disp: , Rfl:     fluticasone (FLONASE) 50 MCG/ACT nasal spray, 1 spray by Nasal route daily, Disp: 1 Bottle, Rfl: 3    meloxicam (MOBIC) 7.5 MG tablet, Take 7.5 mg by mouth daily , Disp: , Rfl:     oxybutynin (DITROPAN) 5 MG tablet, Take 5 mg by mouth 3 times daily , Disp: , Rfl:     ezetimibe (ZETIA) 10 MG tablet, Take 10 mg by mouth daily, Disp: , Rfl:     omeprazole (PRILOSEC) 20 MG capsule, Take 20 mg by mouth daily. , Disp: , Rfl:     lovastatin (MEVACOR) 40 MG tablet, Take 40 mg by mouth nightly., Disp: , Rfl:     metoprolol (LOPRESSOR) 25 MG tablet, Take 25 mg by mouth nightly., Disp: , Rfl:     metFORMIN (GLUCOPHAGE) 500 MG tablet, Take 500 mg by mouth daily (with breakfast) , Disp: , Rfl:     levothyroxine (SYNTHROID) 50 MCG tablet, Take 75 mcg by mouth Daily , Disp: , Rfl:     gabapentin (NEURONTIN) 600 MG tablet, Take 600 mg by mouth 3 times daily. , Disp: , Rfl:     sertraline (ZOLOFT) 100 MG tablet, Take 100 mg by mouth nightly. Takes 1and 1/2 tablets nightly, Disp: , Rfl:     albuterol (PROVENTIL HFA;VENTOLIN HFA) 108 (90 BASE) MCG/ACT inhaler, Inhale 2 puffs into the lungs every 6 hours as needed. , Disp: , Rfl:     aspirin 325 MG EC tablet, Take 1 tablet by mouth 2 times daily for 28 days, Disp: 56 tablet, Rfl: 0    RA VITAMIN B-12 100 MCG tablet, take 1 tablet by mouth once daily, Disp: , Rfl:     Multiple Vitamins-Minerals (THERAPEUTIC MULTIVITAMIN-MINERALS) tablet, Take 1 tablet by mouth daily , Disp: , Rfl:            Allergies   Allergen Reactions    Other         Enviromental       Codeine Nausea And Vomiting       itching      Social History               Socioeconomic History    Marital status:        Spouse name: Not on file    Number of children: Not on file    Years of education: Not on file    Highest education level: Not on file   Occupational History    Not on file   Tobacco Use    Smoking status: Former       Packs/day: 4.00       Years: 45.00       Pack years: 180.00       Types: Cigarettes       Quit date: 2013       Years since quittin.9    Smokeless tobacco: Never    Tobacco comments:       Uses E-cig   Vaping Use    Vaping Use: Every day    Last attempt to quit: 2020   Substance and Sexual Activity    Alcohol use: No       Comment: 8 cans pop daily    Drug use: No    Sexual activity: Yes   Other Topics Concern    Not on file   Social History Narrative    Not on file      Social Determinants of Health      Financial Resource Strain: Not on file   Food Insecurity: Not on file   Transportation Needs: Not on file   Physical Activity: Not on file   Stress: Not on file   Social Connections: Not on file   Intimate Partner Violence: Not on file   Housing Stability: Not on file         Family History         Family History   Problem Relation Age of Onset    Cancer Mother      Esophageal Cancer Mother      Lung Cancer Mother      Cancer Father      Lymphoma Father      Cancer Maternal Grandmother      Stomach Cancer Maternal Grandmother      Liver Cancer Maternal Grandmother      Lung Cancer Maternal Grandfather      Cancer Maternal Grandfather      No Known Problems Paternal Grandmother      No Known Problems Paternal Grandfather      Lung Cancer Maternal Aunt      Esophageal Cancer Maternal Aunt      Lung Cancer Maternal Uncle      Prostate Cancer Maternal Uncle                 REVIEW OF SYSTEMS:      General/Constitution:  (-)weight loss, (-)fever, (-)chills, (-)weakness. Skin: (-) rash,(-) psoriasis,(-) eczema, (-)skin cancer. Musculoskeletal: (-) fractures,  (-) dislocations,(-) collagen vascular disease, (-) fibromyalgia, (-) multiple sclerosis, (-) muscular dystrophy, (-) RSD,(-) joint pain (-)swelling, (-) joint pain,swelling. Neurologic: (-) epilepsy, (-)seizures,(-) brain tumor,(-) TIA, (-)stroke, (-)headaches, (-)Parkinson disease,(-) memory loss, (-) LOC. Cardiovascular: (-) Chest pain, (-) swelling in legs/feet, (-) SOB, (-) cramping in legs/feet with walking. Respiratory: (-) SOB, (-) Coughing, (-) night sweats. GI: (-) nausea, (-) vomiting, (-) diarrhea, (-) blood in stool, (-) gastric ulcer. Psychiatric: (-) Depression, (-) Anxiety, (-) bipolar disease, (-) Alzheimer's Disease  Allergic/Immunologic: (-) allergies latex, (-) allergies metal, (-) skin sensitivity. Hematlogic: (-) anemia, (-) blood transfusion, (-) DVT/PE, (-) Clotting disorders     Subjective:     Vital signs are stable. In general, patient is awake, alert and oriented X3, in no apparent distress. Examination of HENT reveals normocephalic, atraumatic. PERRLA/EOMI sclera are white. Conjunctivae are clear. TM's are intact. Pharynx is pink and moist.  Uvula and tongue are midline. Heart: Positive S1 and positive S2 with regular rate and rhythm. Lungs: Clear to auscultation bilaterally without rales, rhonchi or wheezes. Abdomen: soft, nontender. Positive bowel sounds. No organomegaly. No guarding or rigidity. Constitution:  /62   Pulse 86   Temp 97.7 °F (36.5 °C) (Infrared)   Resp 18   Ht 5' 1\" (1.549 m)   Wt 200 lb (90.7 kg)   LMP 01/01/1993   SpO2 94%   BMI 37.79 kg/m²        Psycihatric:  The patient is alert and oriented x 3, appears to be stated age and in no distress. Respiratory:  Respiratory effort is not labored. Patient is not gasping.   Palpation of the chest not noted with  stress. There is not a popliteal cyst.     R. Knee:  Lachman's negative, Anterior Drawer negative, Posterior Drawer negative  Marysol's positive, Thallasy  positive,   PF grind test positive, Apprehension test negative, Patellar J sign  negative  L. Knee:  Lachman's negative, Anterior Drawer negative, Posterior Drawer negative  Marysol's negative , Thallasy  negative ,   PF grind test negative , Apprehension test negative,  Patellar J sign  negative     Xray Exam:  Left knee: Moderate osteoarthritis at the medial weight-bearing compartment   with milder involvement elsewhere. Very small joint effusion. No fracture   or dislocation. Right knee: Moderate osteoarthritis at the medial weight-bearing compartment   with milder involvement elsewhere. No fracture or dislocation. Normal soft   tissues. MRI:  Medial meniscus tear. Tricompartment degenerative changes most significant to the medial   compartment. Trace knee joint effusion. Radiographic findings reviewed with patient     Assessment:       Encounter Diagnoses   Name Primary? Primary osteoarthritis of right knee Yes         Plan:  Natural history and expected course discussed. Questions answered. Educational materials distributed. Rest, ice, compression, and elevation (RICE) therapy. Reduction in offending activity. I had a lengthy discussion with the patient regarding their diagnosis. I explained treatment options including surgical vs non surgical treatment. I reviewed in detail the risks and benefits and outlined the procedure in detail with expected outcomes and possible complications. I also discussed non surgical treatment such as injections (CSI and visco supplementation), physical therapy, topical creams and NSAID's. They have elected for surgical management at this time.    We discussed various treatment options both surgical and non-surgical.   The patient is unable to ambulate more than 100 feet and is unable to perform the average daily activities including:  Light housework, ADLs, donning clothes, toileting and exercise. Patient has failed previous conservative measures including cortisone injections, NSAIDs, PT, HEP and pain medication and is currently a fall risk to the disability and decreased functioning. The patient wishes to have the total knee arthroplasty. The risks and benefits of a total knee replacement were discussed with the patient. The risks include but are not limited to: infection, injury to blood vessels and nerves, non relief of symptoms, arthrofibrosis of knee, aseptic loosening of prosthesis, intraoperative fracture, blood loss, PE/DVT, MI, dislocation of hip and knee, need for further operative intervention and death. The patient is aware of the risks and wished to proceed with a Right total knee replacement 8/31/22. The patient was counseled at length about the risks of félix Covid-19 during their perioperative period and any recovery window from their procedure. The patient was made aware that félix Covid-19  may worsen their prognosis for recovering from their procedure  and lend to a higher morbidity and/or mortality risk. All material risks, benefits, and reasonable alternatives including postponing the procedure were discussed. The patient does wish to proceed with the procedure at this time.

## 2022-08-31 NOTE — PROGRESS NOTES
CLINICAL PHARMACY NOTE: MEDS TO BEDS    Total # of Prescriptions Filled: 2   The following medications were delivered to the patient:  Perocet 5  Ondansetron 4 mg ODT    Additional Documentation:

## 2022-08-31 NOTE — OP NOTE
Operative Note      Patient: Jerardo Campbell  YOB: 1955  MRN: [id number]    Date of Procedure: 8/31/2022    Pre-Op Diagnosis: Osteoarthritis of right knee, unspecified osteoarthritis type [M17.11]    Post-Op Diagnosis: Same       Procedure(s):  RIGHT KNEE TOTAL ARTHROPLASTY [facility] AND NEPHEW)    Surgeon(s): Ghulam Kirkland DO    Assistant:   Resident: Armando Ledesma DO; Katalina Hernandez DO    Anesthesia: Spinal    Estimated Blood Loss (mL): less than 50     Complications: None    Specimens:   ID Type Source Tests Collected by Time Destination   A : bone right knee Tissue Tissue SURGICAL PATHOLOGY Ghulam Kirkland DO 8/31/2022 1042        Implants:  Implant Name Type Inv. Item Serial No.  Lot No. LRB No. Used Action   CEMENT BNE 20ML 40GM FULL DOSE PMMA W/O ANTIBIO M VISC - [id number]  CEMENT BNE 20ML 40GM FULL DOSE PMMA W/O ANTIBIO M VISC  [employer] [facility]-WD [id number] Right 1 Implanted   BASEPLATE TIB SZ 4 [id number] ML71MM THK2. 3MM R KNEE TI ALLY NP - [id number]  BASEPLATE TIB SZ 4 [id number] ML71MM THK2. 3MM [facility]- [id number] Right 1 Implanted   COMPONENT FEM SZ 4 R KNEE OXINIUM POST STBL REY LEGION - [id number]  COMPONENT FEM SZ 4 R KNEE OXINIUM POST STBL REY [facility] AND NEPH ORTHOPAEDICS- [id number] Right 1 Implanted   INSERT TIB SZ 3-4 [id number] XLPE KNEE POST STBL HI FLX LEGION - [id number]  INSERT TIB SZ 3-4 [id number] XLPE KNEE POST STBL HI FLX LEGION  [facility] AND NEPH ORTHOPAEDICS- [id number] Right 1 Implanted   COMPONENT PAT [id number] THK9MM KNEE HI WT POLY RESURF GEN II - [id number]  COMPONENT PAT [id number] THK9MM KNEE HI WT POLY RESURF GEN II  [employer] AND NEPHEW Erica Evangelista [id number] Right 1 Implanted         Drains: * No LDAs found *    Findings: as above    Detailed Description of Procedure:   below    Department of Orthopedic Surgery  Operative Report        Pre-operative Diagnosis:  Right Knee Osteoarthritis    Post-operative Diagnosis: Right Knee Osteoarthritis    Procedure:  Right Knee Arthroplasty    Surgeon:  Ghulam Kirkland DO     Assistant(s):  as above    Anesthesia:  Spinal anesthesia    Estimated blood loss:  Less than 50 ml    Specimens:  as below    Implants:  as above    Complications:  none    Condition:  Stable    Brief Hospital Course:  Jerardo Campbell is a patient known to Ghulam Kirkland DO's practice with persistent complaints of Right knee pain. Knee pain has failed to be relieved by non-operative conservative measures, and has began affecting daily activities of living. After examination of the patient, review of the radiologic studies, and appropriate pre-operative risk assessment, Ghulam Kirkland DO recommended Right knee arthroplasty, which the patient was agreeable towards. Operative Course: The patient was seen and identified outside the operative suite, in which the operative site was marked as appropriate by patient, surgeon, staff, and anesthesia. The patient was then taken into the operative suite, transferred to the operative table with all bony prominences and neurovascular structures well padded and protected. A tourniquet was placed high on the proximal thigh of the operative extremity. The patient was sedated under the care of the anesthesia team. The operative site was prepped and draped in standard sterile fashion. The tourniquet was inflated to 300 mmHg. An anterior midline incision was made over the knee with full-thickness flaps reflected revealing the anterior joint capsule. Medial parapatellar arthrotomy was performed reflecting the patella laterally. Anterior fat pad and anterior horns of the lateral and medial meniscus were sharply excised. The knee was flexed up. Anterior cruciate ligament and posterior cruciate ligament were then excised. All osteophytes on the distal femur were removed with rongeur. At this point, drilling of the intramedullary canal of the distal femur was then performed.  Distal femoral cutting jig was then placed and pinned in appropriate position. An oscillating saw was used to make the distal femoral cut, discarding the pieces of cut femoral bone and sent for study. The posterior reference guide was then placed on the distal femur after the intramedullary guide and the distal femoral cutting guide were then removed. The posterior referencing guide was then pinned in appropriate position. Soren wing was used to confirm appropriate femoral trial size according to pre-operative templating. Posterior reference guide was then removed. An appropriate sized 4-in-1 cutting guide was applied to the distal femur. Oscillating saw was used to make the anterior, posterior, and chamfer cuts. The 4-in-1 cutting guide was then removed. Attention was turned to the tibia. Intramedullary drilling was then performed of the proximal tibia. The intramedullary guide with the proximal tibial cutting guide was then placed on the tibia. Proximal tibial cutting guide was then pinned in appropriate position. After pinning the proximal tibial cutting guide in appropriate position, oscillating saw was then used to make the proximal tibial cut. The guide was then removed. The proximal tibia that was cut was sharply excised and removed. At this time, all osteophytes on the proximal tibia were then removed with a rongeur. Tensiometer was then placed in extension and flexion, confirming appropriate ligamentous balancing. The box-cutting guide for the posterior-stabilized knee was then placed on the distal femur. The box was then cut in the distal femur without complication. Box-cutting guide was then removed. An appropriately sized trial tibial baseplate and a polyethylene component trial were then placed into the knee. The appropriately sized femur trial component was then placed. The knee was reduced and taken through a range of motion and found to be appropriately stable.  Half pins were then placed in the tibial base patient was awakened from anesthesia, transferred to the hospital bed, and taken to the PACU in stable condition. Disposition: The patient was taken to PACU in stable condition. Once stable, the patient will be transferred to the floor. Orders have been provided to begin physical therapy, weight bear as tolerated Right lower extremity. Patient received a dose of antibiotics preoperatively. We will continue this for 24 hours postoperatively for infection prophylaxis. The patient will also be started on asa for DVT prophylaxis. We have consulted  and case management for discharge planning and consulted the PCP for medical management.       Electronically signed by Eloy Boeck, DO on 8/31/2022 at 12:24 PM

## 2022-08-31 NOTE — ANESTHESIA PROCEDURE NOTES
Peripheral Block    Patient location during procedure: pre-op  Reason for block: post-op pain management and at surgeon's request  Start time: 8/31/2022 9:37 AM  End time: 8/31/2022 9:40 AM  Staffing  Performed: anesthesiologist   Anesthesiologist: Arlet Mahan, DO  Preanesthetic Checklist  Completed: patient identified, IV checked, site marked, risks and benefits discussed, surgical/procedural consents, equipment checked, pre-op evaluation, timeout performed, anesthesia consent given, oxygen available and monitors applied/VS acknowledged  Peripheral Block   Patient position: supine  Prep: ChloraPrep  Provider prep: mask and sterile gloves  Patient monitoring: cardiac monitor, continuous pulse ox, frequent blood pressure checks and IV access  Block type: iPacks  Laterality: right  Injection technique: single-shot  Guidance: ultrasound guided    Needle   Needle type: combined needle/nerve stimulator   Needle gauge: 22 G  Needle localization: ultrasound guidance  Needle length: 10 cm  Assessment   Injection assessment: negative aspiration for heme, no paresthesia on injection and local visualized surrounding nerve on ultrasound  Paresthesia pain: none  Slow fractionated injection: yes  Hemodynamics: stable  Real-time US image taken/store: yes  Outcomes: patient tolerated procedure well    Medications Administered  ropivacaine (NAROPIN) injection 0.5% - Perineural   15 mL - 8/31/2022 9:37:00 AM

## 2022-08-31 NOTE — PROGRESS NOTES
Inpatient T-Scale Score : 45.44  Mobility Inpatient CMS 0-100% Score: 41.77  Mobility Inpatient CMS G-Code Modifier : CK    Nursing cleared patient for PT evaluation. The admitting diagnosis and active problem list as listed above have been reviewed prior to the initiation of this evaluation. OBJECTIVE:   Initial Evaluation  Date: 8/31/2022   Was pt agreeable to Eval/treatment? Yes   Pain Level  4/10   Right knee    Bed Mobility  Rolling: Supervision     Supine to sit: Supervision     Sit to supine: Supervision     Scooting: Supervision      Transfers Sit to stand: Supervision      Ambulation     20 and 100 feet using  standard walker with Supervision       Stair negotiation: ascended and descended   2 x 5 steps bilateral rail first rep, 1 rail and hand held  2nd rep  supervision     ROM Within functional limits except Right knee 0-75°    Strength Within functional limits  except  Right lower extremity 3+/5   Balance Sitting EOB:  good    Dynamic Standing:  good   wheeled walker      Patient is Alert & Oriented x person, place, time, and situation and follows directions    Sensation:  Patient denies numbness/tingling  Edema:  no        Patient education  Patient educated on role of Physical Therapy, risks of immobility, safety and plan of care and  importance of mobility while in hospital       Patient response to education:   Pt verbalized understanding Pt demonstrated skill Pt requires further education in this area   Yes Partial Yes       Treatment:  Patient practiced and was instructed in the following treatment:     Therapist educated and facilitated patient on techniques to increase safety and independence with bed mobility, balance, functional transfers, and functional mobility. Patient performed ankle pumps, quad sets, glut sets x 10 each.  Active  heelslide,   straight leg raise x 10 each  Assisted to edge of bed,  Sat edge of bed 15 minutes with Independent to increase dynamic sitting balance and

## 2022-08-31 NOTE — ANESTHESIA PROCEDURE NOTES
Spinal Block    Patient location during procedure: OR  End time: 8/31/2022 10:21 AM  Reason for block: primary anesthetic  Staffing  Performed: other anesthesia staff   Anesthesiologist: Elma Hernandez DO  Resident/CRNA: Clara Nyhan, APRN - CRNA  Other anesthesia staff: Laly Pope RN  Spinal Block  Patient position: sitting  Prep: Betadine  Patient monitoring: continuous pulse ox and frequent blood pressure checks  Approach: midline  Location: L3/L4  Provider prep: mask and sterile gloves  Local infiltration: lidocaine  Needle  Needle type: Pencan   Needle gauge: 25 G  Needle length: 3.5 in  Assessment  Swirl obtained: Yes  CSF: clear  Attempts: 1  Hemodynamics: stable  Preanesthetic Checklist  Completed: patient identified, IV checked, site marked, risks and benefits discussed, surgical/procedural consents, equipment checked, pre-op evaluation, timeout performed, anesthesia consent given, oxygen available and monitors applied/VS acknowledged

## 2022-09-01 ENCOUNTER — OFFICE VISIT (OUTPATIENT)
Dept: ORTHOPEDIC SURGERY | Age: 67
End: 2022-09-01

## 2022-09-01 DIAGNOSIS — Z96.651 S/P TOTAL KNEE ARTHROPLASTY, RIGHT: Primary | ICD-10-CM

## 2022-09-01 PROCEDURE — 99024 POSTOP FOLLOW-UP VISIT: CPT | Performed by: NURSE PRACTITIONER

## 2022-09-01 RX ORDER — CEPHALEXIN 500 MG/1
500 CAPSULE ORAL 3 TIMES DAILY
Qty: 21 CAPSULE | Refills: 0 | Status: SHIPPED | OUTPATIENT
Start: 2022-09-01 | End: 2022-09-08

## 2022-09-01 RX ORDER — ASPIRIN 325 MG
325 TABLET, DELAYED RELEASE (ENTERIC COATED) ORAL 2 TIMES DAILY
Qty: 60 TABLET | Refills: 0 | Status: SHIPPED
Start: 2022-09-01 | End: 2022-10-13

## 2022-09-01 NOTE — PROGRESS NOTES
Subjective:     Post-Operative week: 1 day Status Post right Total Knee Arthroplasty, surgery date 8/31/22. Systemic or Specific Complaints:none    Objective:     General: alert, appears stated age and cooperative   Wound: Expressed bloody drainage form distal incision   Motion: Flexion: 0 to 90 Degrees   DVT Exam: No evidence of DVT seen on physical exam.  Negative Zina's sign. No cords or calf tenderness. No significant calf/ankle edema. Imaging:  Xrays:  N/A  Radiographic findings reviewed with patient    Assessment:     Encounter Diagnosis   Name Primary? S/P total knee arthroplasty, right Yes      Doing well postoperatively.      Plan:     Aquacell removed, incision cleaned, small amount of bloody drainage from distal incision expressed  Mark dressing applied  Keflex 500 mg tid  Delay starting aspirin until Sunday  Fu as previously scheduled

## 2022-09-14 DIAGNOSIS — Z96.651 S/P TOTAL KNEE ARTHROPLASTY, RIGHT: Primary | ICD-10-CM

## 2022-09-15 ENCOUNTER — OFFICE VISIT (OUTPATIENT)
Dept: ORTHOPEDIC SURGERY | Age: 67
End: 2022-09-15

## 2022-09-15 VITALS — WEIGHT: 200 LBS | TEMPERATURE: 98 F | BODY MASS INDEX: 37.76 KG/M2 | HEIGHT: 61 IN

## 2022-09-15 DIAGNOSIS — M17.11 PRIMARY OSTEOARTHRITIS OF RIGHT KNEE: ICD-10-CM

## 2022-09-15 PROCEDURE — 99024 POSTOP FOLLOW-UP VISIT: CPT | Performed by: ORTHOPAEDIC SURGERY

## 2022-09-15 RX ORDER — OXYCODONE HYDROCHLORIDE AND ACETAMINOPHEN 5; 325 MG/1; MG/1
1 TABLET ORAL EVERY 6 HOURS PRN
Qty: 28 TABLET | Refills: 0 | Status: SHIPPED
Start: 2022-09-15 | End: 2022-10-13 | Stop reason: SDUPTHER

## 2022-09-19 ENCOUNTER — EVALUATION (OUTPATIENT)
Dept: PHYSICAL THERAPY | Age: 67
End: 2022-09-19
Payer: COMMERCIAL

## 2022-09-19 DIAGNOSIS — M17.11 PRIMARY OSTEOARTHRITIS OF RIGHT KNEE: Primary | ICD-10-CM

## 2022-09-19 PROCEDURE — 97163 PT EVAL HIGH COMPLEX 45 MIN: CPT | Performed by: PHYSICAL THERAPIST

## 2022-09-19 NOTE — PROGRESS NOTES
[facility] OUTPATIENT REHABILITATION  PHYSICAL THERAPY INITIAL EVALUATION         Date:  2022   Patient: Yina De La Garza  : 1955  MRN: [id number]  Referring Provider: Neomi Bosworth, DO [address] [facility]     Medical Diagnosis:   M17.11 (ICD-10-CM) - Primary osteoarthritis of right knee    Physician Order: Eval and Treat     SUBJECTIVE:     Surgical procedure: R TKA    Date of surgery: 2022    Services provided following surgery: home care states it was 90 initially then it stiffened up and her flexion decreased    History: TKA due to DJD. Had pain for 4-5 years    Chief complaint: pain, decreased motion, difficulty walking    Pain:   Current: 6/10       Symptom Type / Quality: aching  Location[de-identified] Knee: anterior     Imaging results: XR KNEE RIGHT (1-2 VIEWS)    Result Date: 9/15/2022  EXAMINATION: TWO XRAY VIEWS OF THE RIGHT KNEE 9/15/2022 8:53 am COMPARISON: 2022 HISTORY: ORDERING SYSTEM PROVIDED HISTORY: S/P total knee arthroplasty, right FINDINGS: Anatomically aligned right TKA with moderate joint effusion and no abnormal osseous findings. Right TKA with moderate knee joint effusion. XR KNEE RIGHT (1-2 VIEWS)    Result Date: 2022  EXAMINATION: TWO XRAY VIEWS OF THE RIGHT KNEE 2022 11:41 am COMPARISON: 2022 HISTORY: ORDERING SYSTEM PROVIDED HISTORY: Post Op TECHNOLOGIST PROVIDED HISTORY: Of operative side while in recovery room. Reason for exam:->Post Op FINDINGS: Anatomically aligned recent right TKA with no abnormal bone or soft tissue findings. Right TKA with no unexpected findings.        Past Medical History:  Past Medical History:   Diagnosis Date    Arthritis     back, cervical neck    Asthma     Breast mass     fibercystic     Chronic sinusitis     Colon polyps     COPD (chronic obstructive pulmonary disease) ([address]     controlled with inhalers    Depression     Diabetes mellitus ([address]     Diabetic polyneuropathy [address]     Diverticulitis     Diverticulosis     GERD (gastroesophageal reflux disease)     Hyperlipidemia     Hypertension     Mitral valve prolapse     f/u w/ PCP only - no symptoms    Neuropathy     Overactive thyroid gland     Shortness of breath     Sleep apnea     Thyroid disease      Past Surgical History:   Procedure Laterality Date    ABDOMINAL EXPLORATION SURGERY      APPENDECTOMY  1968    BRONCHOSCOPY  Oct 2013    Bronchoscopy and Mediastinoscopy    CARPAL TUNNEL RELEASE      bilat    CHOLECYSTECTOMY, LAPAROSCOPIC N/A 5/12/2020    CHOLECYSTECTOMY LAPAROSCOPIC WITH IOC performed by Pacheco Ramirez MD at [address] [place] [address] Dr ARTHROSCOPY Left 8/4/2021    LEFT KNEE ARTHROSCOPY, MEDIAL MENISCECTOMY AND DEBRIDEMENT [facility]    left    [address] Left 11/10/2021    LEFT KNEE TOTAL ARTHROPLASTY [facility] & NEPHEW) performed by Neomi Bosworth, DO at [address] [place] [address] St S Right 8/31/2022    RIGHT KNEE TOTAL ARTHROPLASTY ([facility] NEPH) performed by Neomi Bosworth, DO at [facility] 1/23/2020    EGD BIOPSY performed by Pacheco Ramirez MD at [facility] ENDOSCOPY       Medications:   Current Outpatient Medications   Medication Sig Dispense Refill    oxyCODONE-acetaminophen (PERCOCET) 5-325 MG per tablet Take 1 tablet by mouth every 6 hours as needed for Pain for up to 7 days. Intended supply: 7 days.  Take lowest dose possible to manage pain 28 tablet 0    aspirin 325 MG EC tablet Take 1 tablet by mouth in the morning and at bedtime 60 tablet 0    aspirin 325 MG EC tablet Take 1 tablet by mouth in the morning and at bedtime 60 tablet 0    rosuvastatin (CRESTOR) 40 MG tablet Take 40 mg by mouth every evening      RA VITAMIN B-12 100 MCG tablet take 1 tablet by mouth once daily      budesonide-formoterol (SYMBICORT) 160-4.5 MCG/ACT AERO Inhale 2 puffs into the lungs 2 times daily      buPROPion (WELLBUTRIN XL) 150 MG extended release tablet Take 150 mg by mouth nightly      montelukast (SINGULAIR) 10 MG tablet nightly       lisinopril-hydrochlorothiazide (PRINZIDE;ZESTORETIC) 20-12.5 MG per tablet Take 1 tablet by mouth daily      DULoxetine (CYMBALTA) 60 MG extended release capsule Take 60 mg by mouth nightly      cetirizine (ZYRTEC) 10 MG tablet Take 10 mg by mouth daily      fluticasone (FLONASE) 50 MCG/ACT nasal spray 1 spray by Nasal route daily 1 Bottle 3    oxybutynin (DITROPAN) 5 MG tablet Take 5 mg by mouth 3 times daily       ezetimibe (ZETIA) 10 MG tablet Take 10 mg by mouth daily      omeprazole (PRILOSEC) 20 MG capsule Take 20 mg by mouth daily. metoprolol (LOPRESSOR) 25 MG tablet Take 25 mg by mouth nightly. metFORMIN (GLUCOPHAGE) 500 MG tablet Take 500 mg by mouth daily (with breakfast)       levothyroxine (SYNTHROID) 50 MCG tablet Take 75 mcg by mouth Daily       gabapentin (NEURONTIN) 600 MG tablet Take 600 mg by mouth 3 times daily. sertraline (ZOLOFT) 100 MG tablet Take 100 mg by mouth nightly. Takes 1and 1/2 tablets nightly      albuterol (PROVENTIL HFA;VENTOLIN HFA) 108 (90 BASE) MCG/ACT inhaler Inhale 2 puffs into the lungs every 6 hours as needed. No current facility-administered medications for this visit. Occupation: retired. From [employer]    Exercise regimen: none    Hobbies: computers    Patient Goals: pain relief, return to prior activity, walk normally    Precautions/Contraindications: recent surgery    OBJECTIVE:     Estimated body mass index is 37.79 kg/m² as calculated from the following:    Height as of 9/15/22: 5' 1\" (1.549 m). Weight as of 9/15/22: 200 lb (90.7 kg). Observations: Well nourished female with normal affect. Rises carefully, in guarded manner from chair. Ambulates with wheeled walker, rollator.     Inspection: Incision has scabbed area in middle that    Edema: marked global    Gait: antalgic gait, limp R LE, ambulates with rollator    Joint/Motion:    Knee:  Right: empty end feel in flexion, limited by pain  AROM: 78° Flexion,  -18° Extension  PROM: 80° Flexion,  -12° Extension  Left:   AROM: 135° Flexion,  -8° Extension  PROM: 137° Flexion,  -7° Extension    Strength:    Knee:   Right: Flexion 4-/5,  Extension 4-/5  Left: Flexion 5/5,  Extension 5/5    Palpation: Tender to palpation about incision. ASSESSMENT     Ana underwent R TKA 8/31/2022 and has marked pain that is limiting her flexion. This is her primary complaint.  She states the knee bent 90 until a few days after surgery when pain increased markedly    Outcome Measure:   Lower Extremity Functional Scale (LEFS)  % impairment    Problems:   Pain 6/10 constant, waxing / waning  Edema marked  ROM decreased  Strength decreased   Limitations with walking, stairs, use of right lower extremity, limited tolerance to weightbearing tasks and weightbearing duration      [x] There are no barriers affecting plan of care or recovery    [] Barriers to this patient's plan of care or recovery include:    Domestic Concerns:  [x] No  [] Yes:    Short Term goals (2-3 weeks)  Pain 3/10  AROM 100/-10  Strength 4-4+/5   Able to perform / complete the following functions / tasks: progress assistive device    Long Term goals (4-6 weeks)  Pain 1/10  AROM 110/-5  Strength 5-/5  Able to perform / complete the following functions / tasks: normal gait, normal stair negotiation   LEFS 24% impairment  Independent with home exercise program (HEP)    Rehab Potential: [x] Good  [] Fair  [] Poor    PLAN       Treatment Plan:  instruction in home exercise program   therapeutic exercise   therapeutic activity   neuromuscular re-education   gait training   manual therapy   vasocompression     The following CPT codes are likely to be used in the care of this patient:   47458 PT Evaluation: High Complexity   84567 Therapeutic Exercise   66357 Neuromuscular Re-Education   69925 Therapeutic Activities   41318 Manual Therapy   [id number] Gait Training   46853 Vasopneumatic Device     Suggested Professional Referral: [x] No  [] Yes:     Patient Education:  [x] Plans / Goals, Risks / Benefits discussed  [x] Home exercise program  Method of Education: [x] Verbal  [x] Demo  [x] Written  Comprehension of Education:  [x] Verbalizes understanding. [x] Demonstrates understanding. [] Needs Review. [] Demonstrates / verbalizes understanding of HEP / Delpha Fifi previously given. Frequency:  2-3 days per week for 4-6 weeks    Patient understands diagnosis/prognosis and consents to treatment, plan and goals: [x] Yes    [] No     Thank you for the opportunity to work with your patient. If you have questions or comments, please contact me at [phone]; fax: [phone]. Electronically signed by: Gordon Keith PT         Please sign Physician's Certification and return to: [address] [place] [address] PHYSICAL THERAPY  [facility] [address]  Dept: [phone]  Dept Fax: [phone]  Loc: [phone] Certification / Comments     Frequency/Duration 2-3 days per week for 4-6 weeks. Certification period from 9/19/2022  to 12/12/2022. I have reviewed the Plan of Care established for skilled therapy services and certify that the services are required and that they will be provided while the patient is under my care.     Physician's Comments/Revisions:               Physician's Printed Name:                                           [de-identified] Signature:                                                               Date:

## 2022-09-21 ENCOUNTER — TREATMENT (OUTPATIENT)
Dept: PHYSICAL THERAPY | Age: 67
End: 2022-09-21
Payer: COMMERCIAL

## 2022-09-21 DIAGNOSIS — M17.12 PRIMARY OSTEOARTHRITIS OF LEFT KNEE: ICD-10-CM

## 2022-09-21 DIAGNOSIS — M17.11 PRIMARY OSTEOARTHRITIS OF RIGHT KNEE: Primary | ICD-10-CM

## 2022-09-21 PROCEDURE — 97016 VASOPNEUMATIC DEVICE THERAPY: CPT

## 2022-09-21 PROCEDURE — 97110 THERAPEUTIC EXERCISES: CPT

## 2022-09-21 NOTE — PROGRESS NOTES
Physical Therapy Daily Treatment Note    Date: 2022  Patient Name: Anil Castro  : 1955   MRN: [id number]  DOInjury: -  DOSx: 2022  Referring Provider: Haylie Anderson DO   [address] [facility], [place], operated by [facility]     Medical Diagnosis:   M17.11 (ICD-10-CM) - Primary osteoarthritis of right knee         Outcome Measure:  LEFS        X = TO BE PERFORMED NEXT VISIT  > = PROGRESS TO THIS    S:  pt reports compliance with HEP but still having problems bending her knee. O: Discussed anatomy, physiology, body mechanics, principles of loading, and progressive loading/activity. Time  4446-3091      Visit  2  Repeat outcome measure at mid point and end. Pain    Pain at rest 3/10     ROM  AROM  80/ 16 2022     Modalities       Vasopneumatic compression 50 mmHg X 15 min  decrease time to x 12 next visit  Post ex's  MO   Manual      Stretching knee flexion   MT   Stretching       Patella mobs   TE   Prone hangs   TE   Heel props X 3 min  TE   Knee flex stretch-seated 3 x 30 sec  TE   Prone self flexion stretch   TE   Exercise       Nustep  L 5 x 10 min x TE   Quad sets 3 x 10   TE   Heel slides 3 x 10  x TE   SLR   TE   LAQ x x TE   Marching   TA   Squat    TA   Step-ups - FWD    TA   Step-ups - LAT   TA   Step-ups - BWD    TA   Step up and over reciprocally    TA   [] TG  [] Leg Press 2-leg   TE   [] TG  [] Leg Press 1-leg   TE   CR    TE   Knee Extension Machine   TE   Marching gait   NR   Side stepping   NR               A: Tolerated well. Pt able to tolerate all listed ex's   P: . Last rx session script .   Bertha Party, PTA    Treatment Charges: Mins Units   Initial Evaluation     Re-Evaluation     Ther Exercise         TE 25 2   Manual Therapy     MT     Ther Activities        TA     Gait Training          GT     Neuro Re-education NR     Modalities X 15 1   Non-Billable Service Time     Other     Total Time/Units 40 3

## 2022-09-23 ENCOUNTER — TREATMENT (OUTPATIENT)
Dept: PHYSICAL THERAPY | Age: 67
End: 2022-09-23
Payer: COMMERCIAL

## 2022-09-23 DIAGNOSIS — M17.11 PRIMARY OSTEOARTHRITIS OF RIGHT KNEE: Primary | ICD-10-CM

## 2022-09-23 PROCEDURE — 97016 VASOPNEUMATIC DEVICE THERAPY: CPT

## 2022-09-23 PROCEDURE — 97110 THERAPEUTIC EXERCISES: CPT

## 2022-09-23 NOTE — PROGRESS NOTES
Physical Therapy Daily Treatment Note    Date: 2022  Patient Name: Kala Alexander  : 1955   MRN: [id number]  DOInjury: -  DOSx: 2022  Referring Provider: Amarjit Bernal DO   [address] [facility]     Medical Diagnosis:   M17.11 (ICD-10-CM) - Primary osteoarthritis of right knee         Outcome Measure:  LEFS        X = TO BE PERFORMED NEXT VISIT  > = PROGRESS TO THIS    S:  Pt states knee is sore today. States sciatica is hurting as well  O: Discussed anatomy, physiology, body mechanics, principles of loading, and progressive loading/activity. Time  1144-8688      Visit  3   3/ 12- visits [facility] treatment approval  # [id number]    35651 TE   90989 NR  /54  37553 TA /54    43410 Man /18  72740 GT /18  08540 VASO 18  2022 to 2022 Repeat outcome measure at mid point and end. Pain    Pain at rest 3/10     ROM  AROM  80/ 16 2022     Modalities       Vasopneumatic compression 50 mmHg X 15 min  decrease time to x 12 next visit  Post ex's  MO   Manual      Stretching knee flexion   MT   Stretching       Patella mobs   TE   Prone hangs   TE   Heel props X 2 min Long seated TE   Knee flex stretch-seated 3 x 30 sec  TE   Prone self flexion stretch   TE   Exercise       Nustep  L 5 x 10 min  TE   Quad sets 3 x 10  Long seated TE   Heel slides 3 x 10   TE   SLR   TE   LAQ 2 x 10 new  TE   Marching   TA   Squat    TA   Step-ups - FWD    TA   Step-ups - LAT   TA   Step-ups - BWD    TA   Step up and over reciprocally    TA   [] TG  [] Leg Press 2-leg   TE   [] TG  [] Leg Press 1-leg   TE   CR    TE   Knee Extension Machine   TE   Marching gait   NR   Side stepping   NR               A: Tolerated fair. Sciatic nerve irritation contributing to patients increased pain. Modified heel props and Quad sets to long seated secondary to sciatic pain  P: Continue with rehab plan .    Rasta Salines, PTA    Treatment Charges: Mins Units   Initial Evaluation     Re-Evaluation Ther Exercise         TE 30 2   Manual Therapy     MT     Ther Activities        TA     Gait Training          GT     Neuro Re-education NR     Modalities  15 1   Non-Billable Service Time     Other     Total Time/Units 45 3

## 2022-09-26 ENCOUNTER — TREATMENT (OUTPATIENT)
Dept: PHYSICAL THERAPY | Age: 67
End: 2022-09-26
Payer: COMMERCIAL

## 2022-09-26 DIAGNOSIS — M17.11 PRIMARY OSTEOARTHRITIS OF RIGHT KNEE: Primary | ICD-10-CM

## 2022-09-26 PROCEDURE — 97016 VASOPNEUMATIC DEVICE THERAPY: CPT

## 2022-09-26 PROCEDURE — 97110 THERAPEUTIC EXERCISES: CPT

## 2022-09-26 NOTE — PROGRESS NOTES
Physical Therapy Daily Treatment Note    Date: 2022  Patient Name: Jillian Briggs  : 1955   MRN: [id number]  DOInjury: -  DOSx: 2022  Referring Provider: Elaine [facility]     Medical Diagnosis:   M17.11 (ICD-10-CM) - Primary osteoarthritis of right knee         Outcome Measure:  LEFS        X = TO BE PERFORMED NEXT VISIT  > = PROGRESS TO THIS    S:  Pt reports continued sciatic pain > knee pain . O: Discussed anatomy, physiology, body mechanics, principles of loading, and progressive loading/activity. Time  1340-      Visit  4 visit   4 12-18 visits [facility] treatment approval  # [id number]    20232 TE   86093 NR  /  07489 TA /54    13167 Man /18  68091 GT /18  00870 VASO 3/18  2022 to 2022 Repeat outcome measure at mid point and end. Pain    Pain at rest 3/10     ROM  AROM  80/ 10 2022     Modalities       Vasopneumatic compression 50 mmHg X 15 min  decrease time to x 12 next visit  Post ex's  MO   Manual      Stretching knee flexion   MT   Petella mobs  Unable at this time due to incision healing         TE   Prone hangs   TE   Heel props X 2 min Long seated TE   Knee flex stretch-seated 3 x 30 sec  TE   Prone self flexion stretch   TE   Exercise       Nustep  L 5 x 10 min  TE   Quad sets 3 x 10  TE   Heel slides 3 x 10   TE   SLR 3 x 10 new   TE   LAQ 3 x 10   TE   Marching   TA   Squat    TA   Step-ups - FWD    TA   Step-ups - LAT   TA   Step-ups - BWD    TA   Step up and over reciprocally    TA   [] TG  [] Leg Press 2-leg   TE   [] TG  [] Leg Press 1-leg   TE   CR    TE   Knee Extension Machine   TE   Marching gait   NR   Side stepping   NR               A: Tolerated better . Sciatic nerve irritation still contributing to patients increased pain. Progression in knee ext from -16 to now -10 deg. P: Continue with rehab plan .    Angelica Mccracken PTA    Treatment Charges: Mins Units   Initial Evaluation     Re-Evaluation Ther Exercise         TE 30 2   Manual Therapy     MT     Ther Activities        TA     Gait Training          GT     Neuro Re-education NR     Modalities  15 1   Non-Billable Service Time     Other     Total Time/Units 45 3

## 2022-09-28 ENCOUNTER — TREATMENT (OUTPATIENT)
Dept: PHYSICAL THERAPY | Age: 67
End: 2022-09-28
Payer: COMMERCIAL

## 2022-09-28 DIAGNOSIS — M17.11 PRIMARY OSTEOARTHRITIS OF RIGHT KNEE: Primary | ICD-10-CM

## 2022-09-28 PROCEDURE — 97110 THERAPEUTIC EXERCISES: CPT

## 2022-09-28 NOTE — PROGRESS NOTES
Physical Therapy Daily Treatment Note    Date: 2022  Patient Name: Franklin Rivas  : 1955   MRN: [id number]  DOInjury: -  DOSx: 2022  Referring Provider: Brodie Sanders DO   [address] [facility] [place]     Medical Diagnosis:   M17.11 (ICD-10-CM) - Primary osteoarthritis of right knee         Outcome Measure:  LEFS        X = TO BE PERFORMED NEXT VISIT  > = PROGRESS TO THIS    S:  Pt reports feeling better with more stiffness > pain . O: Discussed anatomy, physiology, body mechanics, principles of loading, and progressive loading/activity. Time  9668-6584       Visit    - visits [facility] treatment approval  # [id number]    43530 TE   16924   53225 TA /54    89988 Man /18  01120 GT   83852 VASO 3/18  2022 to 2022 Repeat outcome measure at mid point and end. Pain    Pain at rest 3/10     ROM  AROM  90/ 10 2022     Modalities       Vasopneumatic compression   Post ex's  MO   Manual      Stretching knee flexion   MT   Petella mobs  Unable at this time due to incision healing         TE   Prone hangs   TE   Heel props X 2 min Long seated TE   Knee flex stretch-seated 3 x 30 sec  TE   Prone self flexion stretch   TE   Exercise       Nustep  L 5 x 10 min  TE   Quad sets TE   Heel slides  TE   SLR  TE         Calf raises  X 20     LAQ X 20  TE   Marching X 20     Sidekicks  X 20      Squat    TA   Step-ups - FWD    TA   Step-ups - LAT   TA   Step-ups - BWD    TA   Step up and over reciprocally    TA   [x] TG  [] Leg Press 2-leg L 15 3 x 15 new   TE    Total gym calf raises  L 15 2 x 15 new   TE   CR    TE   Knee Extension Machine   TE   Marching gait   NR   Side stepping   NR               A: Tolerated better . Pt able to progress to performing the total gym as listed. P: Continue with rehab plan .    Erika Pierce PTA    Treatment Charges: Mins Units   Initial Evaluation     Re-Evaluation     Ther Exercise         TE 40 3   Manual Therapy     MT Ther Activities        TA     Gait Training          GT     Neuro Re-education NR     Modalities     Non-Billable Service Time     Other     Total Time/Units 40 3

## 2022-09-30 ENCOUNTER — TELEPHONE (OUTPATIENT)
Dept: PHYSICAL THERAPY | Age: 67
End: 2022-09-30

## 2022-10-03 ENCOUNTER — TREATMENT (OUTPATIENT)
Dept: PHYSICAL THERAPY | Age: 67
End: 2022-10-03
Payer: COMMERCIAL

## 2022-10-03 DIAGNOSIS — M17.11 PRIMARY OSTEOARTHRITIS OF RIGHT KNEE: Primary | ICD-10-CM

## 2022-10-03 PROCEDURE — 97110 THERAPEUTIC EXERCISES: CPT

## 2022-10-03 NOTE — PROGRESS NOTES
Physical Therapy Daily Treatment Note    Date: 10/3/2022  Patient Name: Vincent Chaney  : 1955   MRN: [id number]  DOInjury: -  DOSx: 2022  Referring Provider: [facility] Clark DO  [address] [facility]     Medical Diagnosis:   M17.11 (ICD-10-CM) - Primary osteoarthritis of right knee         Outcome Measure:  LEFS        X = TO BE PERFORMED NEXT VISIT  > = PROGRESS TO THIS    S:  Pt reports still feeling good . O: Discussed anatomy, physiology, body mechanics, principles of loading, and progressive loading/activity. Time  1340      Visit     visits [facility] treatment approval  # [id number]    72619 TE   01602 NR    09610 TA /    49484 Man   37780 GT   80334 VASO 3/18  2022 to 2022 Repeat outcome measure at mid point and end. Pain    Pain at rest 3/10     ROM  AROM  90/ 10  10/03/2022     Modalities       Vasopneumatic compression   Post ex's  MO   Manual      Stretching knee flexion   MT   Petella mobs  Unable at this time due to incision healing         TE   Prone hangs   TE   Heel props X 2 min Long seated TE   Knee flex stretch-seated 3 x 30 sec  TE   Prone self flexion stretch   TE   Exercise       Nustep  L 5 x 10 min  TE   Quad sets TE   Heel slides  TE   SLR  TE         Calf raises  X 25     LAQ X 25  TE   Marching X 25     Sidekicks  X 25     Squat  Next   TA   Step-ups - FWD    TA   Step-ups - LAT   TA   Step-ups - BWD    TA   Step up and over reciprocally    TA   [x] TG  [] Leg Press 2-leg L 15 3 x   20   TE    Total gym calf raises  L 15 2 x  20  TE   CR    TE   Knee Extension Machine   TE   Marching gait   NR   Side stepping   NR               A: Tolerated  well  . Pt able to progress from 3 x 15 to 3 x 20 reps as listed on TG .  P: Continue with rehab plan .  RTD on 10/13/2022  Ernestina Pryor PTA    Treatment Charges: Mins Units   Initial Evaluation     Re-Evaluation     Ther Exercise         TE 40 3   Manual Therapy     MT     Ther Activities        TA     Gait Training          GT     Neuro Re-education NR     Modalities     Non-Billable Service Time     Other     Total Time/Units 40 3

## 2022-10-07 ENCOUNTER — TREATMENT (OUTPATIENT)
Dept: PHYSICAL THERAPY | Age: 67
End: 2022-10-07
Payer: COMMERCIAL

## 2022-10-07 DIAGNOSIS — M17.11 PRIMARY OSTEOARTHRITIS OF RIGHT KNEE: Primary | ICD-10-CM

## 2022-10-07 PROCEDURE — 97530 THERAPEUTIC ACTIVITIES: CPT

## 2022-10-07 PROCEDURE — 97110 THERAPEUTIC EXERCISES: CPT

## 2022-10-07 NOTE — PROGRESS NOTES
Training          GT     Neuro Re-education NR     Modalities     Non-Billable Service Time     Other     Total Time/Units 40 3

## 2022-10-10 ENCOUNTER — TREATMENT (OUTPATIENT)
Dept: PHYSICAL THERAPY | Age: 67
End: 2022-10-10
Payer: COMMERCIAL

## 2022-10-10 DIAGNOSIS — M17.11 PRIMARY OSTEOARTHRITIS OF RIGHT KNEE: Primary | ICD-10-CM

## 2022-10-10 PROCEDURE — 97110 THERAPEUTIC EXERCISES: CPT

## 2022-10-10 PROCEDURE — 97530 THERAPEUTIC ACTIVITIES: CPT

## 2022-10-10 NOTE — PROGRESS NOTES
Physical Therapy Daily Treatment Note    Date: 10/10/2022  Patient Name: Vincent Chaney  : 1955   MRN: [id number]  DOInjury: -  DOSx: 2022  Referring Provider: Fely Clark DO   [address] [facility] [place]     Medical Diagnosis:   M17.11 (ICD-10-CM) - Primary osteoarthritis of right knee         Outcome Measure:  LEFS        X = TO BE PERFORMED NEXT VISIT  > = PROGRESS TO THIS    S:  Pt reports  having L side sciatic soreness/pain today   . O: Discussed anatomy, physiology, body mechanics, principles of loading, and progressive loading/activity. Time  6754-8626      Visit     visits [facility] treatment approval  # [id number]    67722 TE   29330   64011 TA      91534 Man /18  26178   93676 VASO 3/18  2022 to 2022 Repeat outcome measure at mid point and end. Pain    Pain at rest 3/10     ROM  AROM  105 / -5  10/07/2022     Modalities       Vasopneumatic compression   Post ex's  MO   Manual      Stretching knee flexion   MT   Petella mobs  X 30         TE   Prone hangs   TE   Heel props/  X  3 min Long seated TE   Knee flex stretch-seated 3 x 30 sec  TE   Prone self flexion stretch   TE   Exercise       Nustep  L 5 x  5  min  TE   Quad sets TE   Heel slides  TE   SLR  TE         Calf raises  X 25     LAQ X 25  TE   Marching X 25     Sidekicks  X 25     Squat       Step-ups - FWD  6\" X 20   TA   Step-ups - LAT 6\" X 20   TA   Step-ups - BWD  6\" x 20 new   TA   Step up and over reciprocally    TA   [x] TG  [] Leg Press 2-leg L 15 3 x   20   TE    Total gym calf raises  L 15 2 x  20  TE   CR    TE   Knee Extension Machine   TE   Marching gait   NR   Side stepping   NR               A: Tolerated  well  . Pt doing well performing all ex's . P: Continue with rehab plan . RTD on 10/13/2022 . Then possible discharge .   Ernestina Pryor PTA    Treatment Charges: Mins Units   Initial Evaluation     Re-Evaluation     Ther Exercise         TE 30 2   Manual Therapy     MT     Ther Activities        TA 10 1   Gait Training          GT     Neuro Re-education NR     Modalities     Non-Billable Service Time     Other     Total Time/Units 40 3

## 2022-10-13 ENCOUNTER — OFFICE VISIT (OUTPATIENT)
Dept: ORTHOPEDIC SURGERY | Age: 67
End: 2022-10-13

## 2022-10-13 ENCOUNTER — TREATMENT (OUTPATIENT)
Dept: PHYSICAL THERAPY | Age: 67
End: 2022-10-13
Payer: COMMERCIAL

## 2022-10-13 VITALS — HEIGHT: 61 IN | BODY MASS INDEX: 37.76 KG/M2 | WEIGHT: 200 LBS | TEMPERATURE: 98 F

## 2022-10-13 DIAGNOSIS — M17.11 PRIMARY OSTEOARTHRITIS OF RIGHT KNEE: Primary | ICD-10-CM

## 2022-10-13 DIAGNOSIS — M17.11 PRIMARY OSTEOARTHRITIS OF RIGHT KNEE: ICD-10-CM

## 2022-10-13 DIAGNOSIS — Z96.651 S/P TOTAL KNEE ARTHROPLASTY, RIGHT: Primary | ICD-10-CM

## 2022-10-13 PROCEDURE — 99024 POSTOP FOLLOW-UP VISIT: CPT | Performed by: NURSE PRACTITIONER

## 2022-10-13 PROCEDURE — 97530 THERAPEUTIC ACTIVITIES: CPT

## 2022-10-13 PROCEDURE — 97110 THERAPEUTIC EXERCISES: CPT

## 2022-10-13 RX ORDER — OXYCODONE HYDROCHLORIDE AND ACETAMINOPHEN 5; 325 MG/1; MG/1
1 TABLET ORAL EVERY 6 HOURS PRN
Qty: 28 TABLET | Refills: 0 | Status: SHIPPED | OUTPATIENT
Start: 2022-10-13 | End: 2022-10-20

## 2022-10-13 NOTE — PROGRESS NOTES
Physical Therapy Daily Treatment Note    Date: 10/13/2022  Patient Name: Leana Guerrero  : 1955   MRN: [id number]  DOInjury: -  DOSx: 2022  Referring Provider: Tamika Mcdonough DO   [address] [facility]     Medical Diagnosis:   M17.11 (ICD-10-CM) - Primary osteoarthritis of right knee         Outcome Measure:  LEFS        X = TO BE PERFORMED NEXT VISIT  > = PROGRESS TO THIS    S:  Pt reports being in pain the day after last PT session and declined performing ext stretches today . O: Discussed anatomy, physiology, body mechanics, principles of loading, and progressive loading/activity. Time  6215-4021 am      Visit     visits [facility] treatment approval  # [id number]    86068 TE   50733   53659 TA  3 /54    02302 Man /18  30425 GT   37331 VASO 3/18  2022 to 2022 Repeat outcome measure at mid point and end. Pain    Pain at rest 3/10     ROM  AROM  115 / -5  10/13/2022     Modalities       Vasopneumatic compression   Post ex's  MO   Manual      Stretching knee flexion   MT   Petella mobs  X 30         TE   Prone hangs   TE   Heel props/  X  Pt declines to perform  TE   Knee flex stretch-seated  TE   Prone self flexion stretch   TE   Exercise       Nustep  L 5 x  5  min  TE   Quad sets TE   Heel slides  TE   SLR  TE         Calf raises  X 25     LAQ X 25  TE   Marching X 25     Sidekicks  X 25     Squat       Step-ups - FWD  6\" 3 X 25   TA   Step-ups - LAT 6\" 2 X 20   TA   Step-ups - BWD  6\" 2 x 20   TA   Step up and over reciprocally    TA   [x] TG  [] Leg Press 2-leg L 15 3 x   20   TE    Total gym calf raises  Pt declines to perform   TE   CR    TE   Knee Extension Machine   TE   Marching gait   NR   Side stepping   NR               A: Tolerated  well  . Pt doing well performing all ex's . P: decrease in treatment session time today due to   RTD on 10/13/2022 / today . Pt states not sure what she wants to do , maybe possible discharge ? Jimmie Tinoco  Will decide after RTD visit . Pt scheduled x 2 future visits also just to be safe ?   Jeris Olszewski, PTA    Treatment Charges: Mins Units   Initial Evaluation     Re-Evaluation     Ther Exercise         TE 15 1   Manual Therapy     MT     Ther Activities        TA 15 1   Gait Training          GT     Neuro Re-education NR     Modalities     Non-Billable Service Time     Other     Total Time/Units 30 2

## 2022-10-13 NOTE — PROGRESS NOTES
Post-Operative week: 6 Status Post right Total Knee Arthroplasty, DOS: 8/31/22  Systemic or Specific Complaints:No Complaints    Objective:     General: alert, appears stated age and cooperative   Wound: Wound clean and dry no evidence of infection. , No Erythema, No Edema and No Drainage, 2 small areas with scabs, no drainage   Motion: Flexion: 0 to 120 Degrees   DVT Exam: No evidence of DVT seen on physical exam.  Negative Zina's sign. No cords or calf tenderness. No significant calf/ankle edema. Xrays: n/a      Assessment:     Encounter Diagnosis   Name Primary? S/P total knee arthroplasty, right Yes      Doing well postoperatively. Plan:     Continues current post-op course  Patient is to discontinue taking enteric coated aspirin 325mg. Patient is to discontinue wearing BOBY hose. Continue with outpatient physical therapy. Follow up 2 months.

## 2022-10-17 ENCOUNTER — TREATMENT (OUTPATIENT)
Dept: PHYSICAL THERAPY | Age: 67
End: 2022-10-17
Payer: COMMERCIAL

## 2022-10-17 DIAGNOSIS — M17.11 PRIMARY OSTEOARTHRITIS OF RIGHT KNEE: Primary | ICD-10-CM

## 2022-10-17 PROCEDURE — 97530 THERAPEUTIC ACTIVITIES: CPT

## 2022-10-17 PROCEDURE — 97112 NEUROMUSCULAR REEDUCATION: CPT

## 2022-10-17 PROCEDURE — 97110 THERAPEUTIC EXERCISES: CPT

## 2022-10-17 NOTE — PROGRESS NOTES
Physical Therapy Daily Treatment Note    Date: 10/17/2022  Patient Name: Jolie High  : 1955   MRN: [id number]  DOInjury: -  DOSx: 2022  Referring Provider: Yahaira Kiran DO  [address] [facility]     Medical Diagnosis:   M17.11 (ICD-10-CM) - Primary osteoarthritis of right knee         Outcome Measure:  LEFS        X = TO BE PERFORMED NEXT VISIT  > = PROGRESS TO THIS    S:  Pt reports feeling good today  . O: Discussed anatomy, physiology, body mechanics, principles of loading, and progressive loading/activity. Time  1340- 1420       Visit    10 /18 visits [facility] treatment approval  # [id number]    44948 TE   49734 NR   10240 TA      08889 Man /18  68905   07926 VASO 3/18  2022 to 2022 Repeat outcome measure at mid point and end. Pain    Pain at rest 3/10     ROM  AROM  115 / -5  10/17/2022     Modalities       Vasopneumatic compression   Post ex's  MO   Manual      Stretching knee flexion   MT   Petella mobs  X 30         TE   Prone hangs   TE   Heel props/  X  Pt declines to perform  TE   Knee flex stretch-seated  TE   Prone self flexion stretch   TE   Exercise       Nustep  L 5 x  5  min  TE   Quad sets TE   Heel slides  TE   SLR  TE         Calf raises  X 25     LAQ X 25  TE   Marching X 25     Sidekicks  X 25     Squat       Step-ups - FWD  6\" 3 X 25   TA   Step-ups - LAT 6\" 2 X 20   TA   Step-ups - BWD  6\" 2 x 20   TA   Step up and over reciprocally    TA   [x] TG  [] Leg Press 2-leg L 15 3 x   20   TE    Total gym calf raises  Pt declines to perform   TE   CR    TE   Knee Extension Machine   TE   Marching gait 50 feet x 2 new   NR   Side stepping 50 feet x 2 new   NR               A: Tolerated  well  . Pt doing well performing all ex's .     P: decrease in treatment session time today due to   Malena Arcos PTA    Treatment Charges: Mins Units   Initial Evaluation     Re-Evaluation     Ther Exercise         TE 20 1   Manual Therapy MT     Ther Activities        TA 10 1   Gait Training          GT     Neuro Re-education NR 10 1   Modalities     Non-Billable Service Time     Other     Total Time/Units 40 3

## 2022-12-12 DIAGNOSIS — Z96.651 S/P TOTAL KNEE ARTHROPLASTY, RIGHT: Primary | ICD-10-CM

## 2022-12-12 DIAGNOSIS — Z96.652 S/P TOTAL KNEE ARTHROPLASTY, LEFT: ICD-10-CM

## 2022-12-13 ENCOUNTER — OFFICE VISIT (OUTPATIENT)
Dept: ORTHOPEDIC SURGERY | Age: 67
End: 2022-12-13

## 2022-12-13 VITALS — WEIGHT: 200 LBS | HEIGHT: 61 IN | TEMPERATURE: 98 F | BODY MASS INDEX: 37.76 KG/M2

## 2022-12-13 DIAGNOSIS — Z96.652 S/P TOTAL KNEE ARTHROPLASTY, LEFT: ICD-10-CM

## 2022-12-13 DIAGNOSIS — Z96.651 S/P TOTAL KNEE ARTHROPLASTY, RIGHT: Primary | ICD-10-CM

## 2022-12-23 DIAGNOSIS — M25.522 LEFT ELBOW PAIN: Primary | ICD-10-CM

## 2023-03-31 ENCOUNTER — PROCEDURE VISIT (OUTPATIENT)
Dept: AUDIOLOGY | Age: 68
End: 2023-03-31

## 2023-03-31 ENCOUNTER — OFFICE VISIT (OUTPATIENT)
Dept: ENT CLINIC | Age: 68
End: 2023-03-31
Payer: COMMERCIAL

## 2023-03-31 VITALS
SYSTOLIC BLOOD PRESSURE: 113 MMHG | WEIGHT: 212 LBS | BODY MASS INDEX: 40.02 KG/M2 | DIASTOLIC BLOOD PRESSURE: 73 MMHG | HEART RATE: 73 BPM | HEIGHT: 61 IN

## 2023-03-31 DIAGNOSIS — H90.A31 MIXED CONDUCTIVE AND SENSORINEURAL HEARING LOSS OF RIGHT EAR WITH RESTRICTED HEARING OF LEFT EAR: Primary | ICD-10-CM

## 2023-03-31 DIAGNOSIS — H90.3 SENSORINEURAL HEARING LOSS (SNHL) OF BOTH EARS: Primary | ICD-10-CM

## 2023-03-31 DIAGNOSIS — H93.13 TINNITUS OF BOTH EARS: ICD-10-CM

## 2023-03-31 DIAGNOSIS — H69.81 DYSFUNCTION OF RIGHT EUSTACHIAN TUBE: ICD-10-CM

## 2023-03-31 PROCEDURE — G8484 FLU IMMUNIZE NO ADMIN: HCPCS | Performed by: OTOLARYNGOLOGY

## 2023-03-31 PROCEDURE — G8417 CALC BMI ABV UP PARAM F/U: HCPCS | Performed by: OTOLARYNGOLOGY

## 2023-03-31 PROCEDURE — 1036F TOBACCO NON-USER: CPT | Performed by: OTOLARYNGOLOGY

## 2023-03-31 PROCEDURE — G8427 DOCREV CUR MEDS BY ELIG CLIN: HCPCS | Performed by: OTOLARYNGOLOGY

## 2023-03-31 PROCEDURE — 3078F DIAST BP <80 MM HG: CPT | Performed by: OTOLARYNGOLOGY

## 2023-03-31 PROCEDURE — 3074F SYST BP LT 130 MM HG: CPT | Performed by: OTOLARYNGOLOGY

## 2023-03-31 PROCEDURE — 3017F COLORECTAL CA SCREEN DOC REV: CPT | Performed by: OTOLARYNGOLOGY

## 2023-03-31 PROCEDURE — 1123F ACP DISCUSS/DSCN MKR DOCD: CPT | Performed by: OTOLARYNGOLOGY

## 2023-03-31 PROCEDURE — G8399 PT W/DXA RESULTS DOCUMENT: HCPCS | Performed by: OTOLARYNGOLOGY

## 2023-03-31 PROCEDURE — 1090F PRES/ABSN URINE INCON ASSESS: CPT | Performed by: OTOLARYNGOLOGY

## 2023-03-31 PROCEDURE — 99203 OFFICE O/P NEW LOW 30 MIN: CPT | Performed by: OTOLARYNGOLOGY

## 2023-03-31 ASSESSMENT — ENCOUNTER SYMPTOMS
SHORTNESS OF BREATH: 0
COUGH: 0
EYE DISCHARGE: 0
EYE REDNESS: 0
STRIDOR: 0
VOMITING: 0
NAUSEA: 0
ALLERGIC/IMMUNOLOGIC NEGATIVE: 1
COLOR CHANGE: 0

## 2023-03-31 NOTE — PROGRESS NOTES
This patient was referred for audiometric and tympanometric testing by Dr. John Arevalo due to ear pressure/fullness and pulsatile tinnitus, right ear. Patient is also experiencing a bilateral decrease in hearing sensitivity, right ear worse. Audiometry using pure tone air and bone conduction testing revealed a normal-to-moderately-severe sensorineural hearing loss, left ear and a mild-to-severe mixed hearing loss, right ear. Reliability was good. Speech reception thresholds were in good agreement with the pure tone averages, bilaterally. Speech discrimination scores were 96%, right ear and 92%, left ear at 55dBHL. Tympanometry revealed negative middle ear peak pressure and reduced compliance, right ear and normal middle ear peak pressure and compliance, left ear. The results were reviewed with the patient. Recommendations for follow up will be made pending physician consult.     Mariaelena Hernandez CCC/SUZY  Audiologist  [id number]  NPI#:  [id number]      Electronically signed by Lita Berry on 3/31/2023 at 9:36 AM

## 2023-04-04 ENCOUNTER — TELEPHONE (OUTPATIENT)
Dept: AUDIOLOGY | Age: 68
End: 2023-04-04

## 2023-04-06 ENCOUNTER — TELEPHONE (OUTPATIENT)
Dept: AUDIOLOGY | Age: 68
End: 2023-04-06

## 2023-04-06 NOTE — TELEPHONE ENCOUNTER
Patient called to initiate hearing aid Prior Authorization for hearing aids through [facility]. Explained to patient that she does not meet the PTA requirements, at this time.   [employer] requirements:  31dB or more  Patient PTA:  R:  22dB  L:  18dB    [address] [facility]/A  Audiologist  [id number]  NPI#:  [id number]

## 2023-06-06 ENCOUNTER — HOSPITAL ENCOUNTER (OUTPATIENT)
Age: 68
Discharge: HOME | End: 2023-06-06
Payer: COMMERCIAL

## 2023-06-06 LAB
HBA1C MFR BLD: 6.8 % (ref 4–5.6)
MICROALBUMIN UR-MCNC: <12 MG/L
VIT B12 SERPL-MCNC: 388 PG/ML (ref 211–946)
VITAMIN D 25-HYDROXY: 34 NG/ML (ref 30–100)

## 2023-06-06 PROCEDURE — 84439 ASSAY OF FREE THYROXINE: CPT

## 2023-06-06 PROCEDURE — 36415 COLL VENOUS BLD VENIPUNCTURE: CPT

## 2023-06-06 PROCEDURE — 82607 VITAMIN B-12: CPT

## 2023-06-06 PROCEDURE — 84443 ASSAY THYROID STIM HORMONE: CPT

## 2023-06-06 PROCEDURE — 83036 HEMOGLOBIN GLYCOSYLATED A1C: CPT

## 2023-06-06 PROCEDURE — 80053 COMPREHEN METABOLIC PANEL: CPT

## 2023-06-06 PROCEDURE — 80061 LIPID PANEL: CPT

## 2023-06-06 PROCEDURE — 82044 UR ALBUMIN SEMIQUANTITATIVE: CPT

## 2023-06-06 PROCEDURE — 85025 COMPLETE CBC W/AUTO DIFF WBC: CPT

## 2023-06-06 PROCEDURE — 82306 VITAMIN D 25 HYDROXY: CPT

## 2023-08-15 ENCOUNTER — OFFICE VISIT (OUTPATIENT)
Dept: ORTHOPEDIC SURGERY | Age: 68
End: 2023-08-15

## 2023-08-15 VITALS — TEMPERATURE: 97.6 F | HEIGHT: 61 IN | BODY MASS INDEX: 38.71 KG/M2 | WEIGHT: 205 LBS

## 2023-08-15 DIAGNOSIS — Z96.651 S/P TOTAL KNEE ARTHROPLASTY, RIGHT: Primary | ICD-10-CM

## 2023-08-15 DIAGNOSIS — Z96.652 S/P TOTAL KNEE ARTHROPLASTY, LEFT: ICD-10-CM

## 2023-08-15 NOTE — PROGRESS NOTES
Chief Complaint   Patient presents with    Knee Pain     Bilateral , F/U TKA left DOS 11/10/21, TKA right 8/2022  Patient noted having a lot of pain and stiffness after a very short time sitting       Tere Gil returns today for follow-up of her left TKA 8/31/22 and RTKA 11/10/21. she reports this is better than when I saw her last.  The patient's pain level was a 6/7/10. The previous treatment was successful.     Past Medical History:   Diagnosis Date    Arthritis     back, cervical neck    Asthma     Breast mass     fibercystic     Chronic sinusitis     Colon polyps     COPD (chronic obstructive pulmonary disease) ([facility])     controlled with inhalers    Depression     Diabetes mellitus ([address])     Diabetic polyneuropathy ([address])     Diverticulitis     Diverticulosis     GERD (gastroesophageal reflux disease)     Hyperlipidemia     Hypertension     Mitral valve prolapse     f/u w/ PCP only - no symptoms    Neuropathy     Overactive thyroid gland     Shortness of breath     Sleep apnea     Thyroid disease      Past Surgical History:   Procedure Laterality Date    ABDOMINAL EXPLORATION SURGERY      APPENDECTOMY  1968    BRONCHOSCOPY  Oct 2013    Bronchoscopy and Mediastinoscopy    CARPAL TUNNEL RELEASE      bilat    CHOLECYSTECTOMY, LAPAROSCOPIC N/A 5/12/2020    CHOLECYSTECTOMY LAPAROSCOPIC WITH IOC performed by Akila Michael MD at [address] [facility] ARTHROSCOPY Left 8/4/2021    LEFT KNEE ARTHROSCOPY, MEDIAL MENISCECTOMY AND DEBRIDEMENT performed by Belinda Adams DO at [address]  10/1995    left    [address] 11/10/2021    LEFT KNEE TOTAL ARTHROPLASTY [facility]) performed by Belinda Adams DO at [address] Right 8/31/2022    RIGHT KNEE TOTAL ARTHROPLASTY ([facility] PORTILLO) performed by Belinda Adams DO at [address] [facility] 1/23/2020    EGD BIOPSY performed by Akila Michael MD at

## 2023-09-25 NOTE — PROGRESS NOTES
Chief Complaint   Patient presents with    Knee Pain     F/u bilateral knee TKA patient had a fall on 12/10       Brandy Salcedo returns today for follow-up of her left TKA. DOS: 11/10/2021. Right tka 8/2022 She reports this is better than when I saw the patient last.  She reports she fell recently and wanted to get them looked at.      Past Medical History:   Diagnosis Date    Arthritis     back, cervical neck    Asthma     Breast mass     fibercystic     Chronic sinusitis     Colon polyps     COPD (chronic obstructive pulmonary disease) (HCC)     controlled with inhalers    Depression     Diabetes mellitus ([address]     Diabetic polyneuropathy ([address]     Diverticulitis     Diverticulosis     GERD (gastroesophageal reflux disease)     Hyperlipidemia     Hypertension     Mitral valve prolapse     f/u w/ PCP only - no symptoms    Neuropathy     Overactive thyroid gland     Shortness of breath     Sleep apnea     Thyroid disease      Past Surgical History:   Procedure Laterality Date    ABDOMINAL EXPLORATION SURGERY      APPENDECTOMY  1968    BRONCHOSCOPY  Oct 2013    Bronchoscopy and Mediastinoscopy    CARPAL TUNNEL RELEASE      bilat    CHOLECYSTECTOMY, LAPAROSCOPIC N/A 5/12/2020    CHOLECYSTECTOMY LAPAROSCOPIC WITH IOC performed by Maddy Muse MD at [address] [place] [address] Dr ARTHROSCOPY Left 8/4/2021    LEFT KNEE ARTHROSCOPY, MEDIAL MENISCECTOMY AND DEBRIDEMENT performed by Roland Cabrera DO at [facility]  10/1995    left    [address]  Left 11/10/2021    LEFT KNEE TOTAL ARTHROPLASTY [facility]) performed by Roland Cabrera DO at [address] [place] [address] S Right 8/31/2022    RIGHT KNEE TOTAL ARTHROPLASTY ([facility]) performed by Roland Cabrera DO at [place] [address] 1/23/2020    EGD BIOPSY performed by Maddy Muse MD at [place] [address]       Current Outpatient Medications:     rosuvastatin (CRESTOR) 40 MG tablet, Take 40 mg by mouth every evening, Disp: , Rfl:     RA VITAMIN B-12 100 MCG tablet, take 1 tablet by mouth once daily, Disp: , Rfl:     budesonide-formoterol (SYMBICORT) 160-4.5 MCG/ACT AERO, Inhale 2 puffs into the lungs 2 times daily, Disp: , Rfl:     buPROPion (WELLBUTRIN XL) 150 MG extended release tablet, Take 150 mg by mouth nightly, Disp: , Rfl:     montelukast (SINGULAIR) 10 MG tablet, nightly , Disp: , Rfl:     lisinopril-hydrochlorothiazide (PRINZIDE;ZESTORETIC) 20-12.5 MG per tablet, Take 1 tablet by mouth daily, Disp: , Rfl:     DULoxetine (CYMBALTA) 60 MG extended release capsule, Take 60 mg by mouth nightly, Disp: , Rfl:     cetirizine (ZYRTEC) 10 MG tablet, Take 10 mg by mouth daily, Disp: , Rfl:     fluticasone (FLONASE) 50 MCG/ACT nasal spray, 1 spray by Nasal route daily, Disp: 1 Bottle, Rfl: 3    oxybutynin (DITROPAN) 5 MG tablet, Take 5 mg by mouth 3 times daily , Disp: , Rfl:     ezetimibe (ZETIA) 10 MG tablet, Take 10 mg by mouth daily, Disp: , Rfl:     omeprazole (PRILOSEC) 20 MG capsule, Take 20 mg by mouth daily. , Disp: , Rfl:     metoprolol (LOPRESSOR) 25 MG tablet, Take 25 mg by mouth nightly., Disp: , Rfl:     metFORMIN (GLUCOPHAGE) 500 MG tablet, Take 500 mg by mouth daily (with breakfast) , Disp: , Rfl:     levothyroxine (SYNTHROID) 50 MCG tablet, Take 75 mcg by mouth Daily , Disp: , Rfl:     gabapentin (NEURONTIN) 600 MG tablet, Take 600 mg by mouth 3 times daily. , Disp: , Rfl:     sertraline (ZOLOFT) 100 MG tablet, Take 100 mg by mouth nightly. Takes 1and 1/2 tablets nightly, Disp: , Rfl:     albuterol (PROVENTIL HFA;VENTOLIN HFA) 108 (90 BASE) MCG/ACT inhaler, Inhale 2 puffs into the lungs every 6 hours as needed. , Disp: , Rfl:   Allergies   Allergen Reactions    Other      Enviromental      Codeine Nausea And Vomiting     itching     Social History     Socioeconomic History    Marital status:      Spouse name: Not on file    Number of children: Not on file Years of education: Not on file    Highest education level: Not on file   Occupational History    Not on file   Tobacco Use    Smoking status: Former     Packs/day: 4.00     Years: 45.00     Pack years: 180.00     Types: Cigarettes     Quit date: 2013     Years since quittin.2    Smokeless tobacco: Never    Tobacco comments:     Uses E-cig   Vaping Use    Vaping Use: Every day    Last attempt to quit: 2020   Substance and Sexual Activity    Alcohol use: Not Currently     Comment: 8 cans pop daily    Drug use: No    Sexual activity: Yes   Other Topics Concern    Not on file   Social History Narrative    Not on file     Social Determinants of Health     Financial Resource Strain: Not on file   Food Insecurity: Not on file   Transportation Needs: Not on file   Physical Activity: Not on file   Stress: Not on file   Social Connections: Not on file   Intimate Partner Violence: Not on file   Housing Stability: Not on file       Review of Systems:     Skin: (-) rash,(-) psoriasis,(-) eczema, (-)skin cancer. Musculoskeletal: (-) fractures,  (-) dislocations,(-) collagen vascular disease, (-) fibromyalgia, (-) multiple sclerosis, (-) muscular dystrophy, (-) RSD,(-) joint pain (-)swelling, (-) joint pain,swelling. Neurologic: (-) epilepsy, (-)seizures,(-) brain tumor,(-) TIA, (-)stroke, (-)headaches, (-)Parkinson disease,(-) memory loss, (-) LOC. Cardiovascular: (-) Chest pain, (-) swelling in legs/feet, (-) SOB, (-) cramping in legs/feet with walking. Subjective:    Constitutional:    The patient is alert and oriented x 3, appears to be stated age and in no distress. Temp 98 °F (36.7 °C)   Ht 5' 1\" (1.549 m)   Wt 200 lb (90.7 kg)   LMP 1993   BMI 37.79 kg/m²     Skin:  Upon inspection: the skin appears warm, dry and intact. There is a previous scar over the affected area. There is not any cellulitis, lymphedema or cutaneous lesions noted in the lower extremities.    Upon palpation there is no induration noted. Neurologic:  Gait: normal;  Motor exam of the lower extremities show ; quadriceps, hamstrings, foot dorsi and plantar flexors intact R.  5/5 and L. 5/5. Deep tendon reflexes are 2/4 at the knees and 2/4 at the ankles with strong extensor hallicus longus motor strength bilaterally. Sensory to both feet is intact to all sensory roots. Cardiovascular: The vascular exam is normal and is well perfused to distal extremities. Distal pulses DP/PT: R. 2+; L. 2+. There is cap refill noted less than two seconds in all digits. There is not edema of the bilateral lower extremities. There is not varicosities noted in the distal extremities. Lymph:  Upon palpation,  there is no lymphadenopathy noted in bilateral lower extremities. Musculoskeletal:    Lumbar exam:  On visual inspection, there is not deformity of the spine. full range of motion, no tenderness, palpable spasm or pain on motion. Special tests: Straight Leg Raise negative, Sunday testnegative. Hip exam:  Upon inspection, there is not deformity noted. Upon palpation there is not tenderness. ROM: is   full and symmetrical.   Strength: Hip Flexors 5/5; Hip Abductors 5/5; Hip Adduction 5/5. Knee exam:  Left knee exam shows;  range of motion of R. Knee is 0 to 120, and L. Knee is 0 to 120. The patient does not have  pain on motion, effusion is mild, there is not tenderness over the  medial region, there are not any masses, there is not ligamentous instability, there is not  deformity noted. Knee exam: neither positive for moderate crepitations, some mild tenderness laxity is not noted with  stress.       R. Knee:  Lachman's negative, Anterior Drawer negative, Posterior Drawer negative  Marysol's negative, Thallasy  negative,   PF grind test negative, Apprehension test negative, Patellar J sign  negative  L. Knee:  Lachman's negative, Anterior Drawer negative, Posterior Drawer negative  Marysol's negative, Thallasy Detail Level: Detailed negative,   PF grind test negative, Apprehension test negative,  Patellar J sign  Negative    Xrays: b/l  TKA well aligned with no signs of aseptic loosening    Radiographic findings reviewed with patient    Impression:   Encounter Diagnoses   Name Primary?     S/P total knee arthroplasty, right Yes    S/P total knee arthroplasty, left        Plan:  Hep  Activity as tolerated  Discussed no loosening to prosthesis  Fu in 8 months with xr right knee only Products Recommended: [employer] General Sunscreen Counseling: I recommended a broad spectrum sunscreen with a SPF of 30 or higher.  I explained that SPF 30 sunscreens block approximately 97 percent of the sun's harmful rays.  Sunscreens should be applied at least 15 minutes prior to expected sun exposure and then every 2 hours after that as long as sun exposure continues. If swimming or exercising sunscreen should be reapplied every 45 minutes to an hour after getting wet or sweating.  One ounce, or the equivalent of a shot glass full of sunscreen, is adequate to protect the skin not covered by a bathing suit. I also recommended a lip balm with a sunscreen as well. Sun protective clothing can be used in lieu of sunscreen but must be worn the entire time you are exposed to the sun's rays.

## 2024-06-14 LAB — DIABETIC RETINOPATHY: NEGATIVE

## 2024-07-18 NOTE — TELEPHONE ENCOUNTER
Patient called and left a voicemail message. Returned patient message. Left a voicemail at the patient contact number.     Electronically signed by Rebecka Klinefelter, AuD on 4/4/2023 at 9:35 AM
Detail Level: Zone
Detail Level: Detailed

## 2024-08-26 SDOH — HEALTH STABILITY: PHYSICAL HEALTH: ON AVERAGE, HOW MANY DAYS PER WEEK DO YOU ENGAGE IN MODERATE TO STRENUOUS EXERCISE (LIKE A BRISK WALK)?: 0 DAYS

## 2024-08-27 ENCOUNTER — OFFICE VISIT (OUTPATIENT)
Dept: FAMILY MEDICINE CLINIC | Age: 69
End: 2024-08-27
Payer: MEDICARE

## 2024-08-27 VITALS
SYSTOLIC BLOOD PRESSURE: 132 MMHG | BODY MASS INDEX: 37.76 KG/M2 | WEIGHT: 200 LBS | HEART RATE: 74 BPM | DIASTOLIC BLOOD PRESSURE: 74 MMHG | OXYGEN SATURATION: 98 % | HEIGHT: 61 IN | TEMPERATURE: 97 F | RESPIRATION RATE: 16 BRPM

## 2024-08-27 DIAGNOSIS — J44.9 CHRONIC OBSTRUCTIVE PULMONARY DISEASE, UNSPECIFIED COPD TYPE (HCC): ICD-10-CM

## 2024-08-27 DIAGNOSIS — Z12.31 ENCOUNTER FOR SCREENING MAMMOGRAM FOR MALIGNANT NEOPLASM OF BREAST: ICD-10-CM

## 2024-08-27 DIAGNOSIS — F32.A DEPRESSION, UNSPECIFIED DEPRESSION TYPE: ICD-10-CM

## 2024-08-27 DIAGNOSIS — I10 ESSENTIAL HYPERTENSION: ICD-10-CM

## 2024-08-27 DIAGNOSIS — M79.7 FIBROMYALGIA: ICD-10-CM

## 2024-08-27 DIAGNOSIS — K21.9 GASTROESOPHAGEAL REFLUX DISEASE, UNSPECIFIED WHETHER ESOPHAGITIS PRESENT: ICD-10-CM

## 2024-08-27 DIAGNOSIS — J30.2 SEASONAL ALLERGIES: ICD-10-CM

## 2024-08-27 DIAGNOSIS — E55.9 VITAMIN D DEFICIENCY: ICD-10-CM

## 2024-08-27 DIAGNOSIS — E78.2 MIXED HYPERLIPIDEMIA: ICD-10-CM

## 2024-08-27 DIAGNOSIS — E78.5 HYPERLIPIDEMIA, UNSPECIFIED HYPERLIPIDEMIA TYPE: ICD-10-CM

## 2024-08-27 DIAGNOSIS — N18.31 TYPE 2 DIABETES MELLITUS WITH STAGE 3A CHRONIC KIDNEY DISEASE, WITHOUT LONG-TERM CURRENT USE OF INSULIN (HCC): Primary | ICD-10-CM

## 2024-08-27 DIAGNOSIS — N32.81 OVERACTIVE BLADDER: ICD-10-CM

## 2024-08-27 DIAGNOSIS — E03.9 HYPOTHYROIDISM, UNSPECIFIED TYPE: ICD-10-CM

## 2024-08-27 DIAGNOSIS — E11.22 TYPE 2 DIABETES MELLITUS WITH STAGE 3A CHRONIC KIDNEY DISEASE, WITHOUT LONG-TERM CURRENT USE OF INSULIN (HCC): Primary | ICD-10-CM

## 2024-08-27 LAB — HBA1C MFR BLD: 6.6 %

## 2024-08-27 PROCEDURE — 2022F DILAT RTA XM EVC RTNOPTHY: CPT | Performed by: FAMILY MEDICINE

## 2024-08-27 PROCEDURE — 3075F SYST BP GE 130 - 139MM HG: CPT | Performed by: FAMILY MEDICINE

## 2024-08-27 PROCEDURE — G8417 CALC BMI ABV UP PARAM F/U: HCPCS | Performed by: FAMILY MEDICINE

## 2024-08-27 PROCEDURE — 4004F PT TOBACCO SCREEN RCVD TLK: CPT | Performed by: FAMILY MEDICINE

## 2024-08-27 PROCEDURE — 1123F ACP DISCUSS/DSCN MKR DOCD: CPT | Performed by: FAMILY MEDICINE

## 2024-08-27 PROCEDURE — G2211 COMPLEX E/M VISIT ADD ON: HCPCS | Performed by: FAMILY MEDICINE

## 2024-08-27 PROCEDURE — 83036 HEMOGLOBIN GLYCOSYLATED A1C: CPT | Performed by: FAMILY MEDICINE

## 2024-08-27 PROCEDURE — 1090F PRES/ABSN URINE INCON ASSESS: CPT | Performed by: FAMILY MEDICINE

## 2024-08-27 PROCEDURE — 3023F SPIROM DOC REV: CPT | Performed by: FAMILY MEDICINE

## 2024-08-27 PROCEDURE — G8427 DOCREV CUR MEDS BY ELIG CLIN: HCPCS | Performed by: FAMILY MEDICINE

## 2024-08-27 PROCEDURE — 3017F COLORECTAL CA SCREEN DOC REV: CPT | Performed by: FAMILY MEDICINE

## 2024-08-27 PROCEDURE — G8399 PT W/DXA RESULTS DOCUMENT: HCPCS | Performed by: FAMILY MEDICINE

## 2024-08-27 PROCEDURE — 99204 OFFICE O/P NEW MOD 45 MIN: CPT | Performed by: FAMILY MEDICINE

## 2024-08-27 PROCEDURE — 3078F DIAST BP <80 MM HG: CPT | Performed by: FAMILY MEDICINE

## 2024-08-27 PROCEDURE — 3044F HG A1C LEVEL LT 7.0%: CPT | Performed by: FAMILY MEDICINE

## 2024-08-27 RX ORDER — LOVASTATIN 40 MG
80 TABLET ORAL NIGHTLY
Qty: 180 TABLET | Refills: 1 | Status: SHIPPED | OUTPATIENT
Start: 2024-08-27

## 2024-08-27 RX ORDER — CHOLECALCIFEROL (VITAMIN D3) 50 MCG
2000 TABLET ORAL DAILY
Qty: 90 TABLET | Refills: 1 | Status: SHIPPED | OUTPATIENT
Start: 2024-08-27

## 2024-08-27 RX ORDER — GLUCOSAMINE HCL/CHONDROITIN SU 500-400 MG
1 CAPSULE ORAL DAILY
COMMUNITY
End: 2024-08-27 | Stop reason: SDUPTHER

## 2024-08-27 RX ORDER — EZETIMIBE 10 MG/1
10 TABLET ORAL DAILY
Qty: 90 TABLET | Refills: 1 | Status: SHIPPED | OUTPATIENT
Start: 2024-08-27

## 2024-08-27 RX ORDER — LANCETS 33 GAUGE
1 EACH MISCELLANEOUS DAILY
Qty: 100 EACH | Refills: 1 | Status: SHIPPED | OUTPATIENT
Start: 2024-08-27

## 2024-08-27 RX ORDER — METOPROLOL TARTRATE 25 MG/1
25 TABLET, FILM COATED ORAL NIGHTLY
Qty: 180 TABLET | Refills: 1 | Status: SHIPPED | OUTPATIENT
Start: 2024-08-27

## 2024-08-27 RX ORDER — LOVASTATIN 40 MG
80 TABLET ORAL NIGHTLY
COMMUNITY
End: 2024-08-27 | Stop reason: SDUPTHER

## 2024-08-27 RX ORDER — BUPROPION HYDROCHLORIDE 150 MG/1
150 TABLET ORAL NIGHTLY
Qty: 90 TABLET | Refills: 1 | Status: SHIPPED | OUTPATIENT
Start: 2024-08-27

## 2024-08-27 RX ORDER — LEVOTHYROXINE SODIUM 75 UG/1
75 TABLET ORAL DAILY
COMMUNITY
End: 2024-08-27 | Stop reason: SDUPTHER

## 2024-08-27 RX ORDER — CHOLECALCIFEROL (VITAMIN D3) 1250 MCG
1 CAPSULE ORAL
Qty: 36 CAPSULE | Refills: 0 | Status: CANCELLED | OUTPATIENT
Start: 2024-08-28

## 2024-08-27 RX ORDER — GABAPENTIN 800 MG/1
800 TABLET ORAL 3 TIMES DAILY
Qty: 270 TABLET | Refills: 1 | Status: SHIPPED | OUTPATIENT
Start: 2024-08-27 | End: 2025-02-23

## 2024-08-27 RX ORDER — FLUTICASONE PROPIONATE AND SALMETEROL XINAFOATE 115; 21 UG/1; UG/1
2 AEROSOL, METERED RESPIRATORY (INHALATION) 2 TIMES DAILY
Qty: 12 G | Refills: 1 | Status: SHIPPED | OUTPATIENT
Start: 2024-08-27

## 2024-08-27 RX ORDER — ALBUTEROL SULFATE 90 UG/1
2 AEROSOL, METERED RESPIRATORY (INHALATION) EVERY 6 HOURS PRN
Qty: 18 G | Refills: 3 | Status: SHIPPED | OUTPATIENT
Start: 2024-08-27

## 2024-08-27 RX ORDER — MELOXICAM 7.5 MG/1
7.5 TABLET ORAL DAILY
Qty: 90 TABLET | Refills: 0 | Status: CANCELLED | OUTPATIENT
Start: 2024-08-27

## 2024-08-27 RX ORDER — MONTELUKAST SODIUM 10 MG/1
10 TABLET ORAL NIGHTLY
Qty: 90 TABLET | Refills: 1 | Status: SHIPPED | OUTPATIENT
Start: 2024-08-27

## 2024-08-27 RX ORDER — CHOLECALCIFEROL (VITAMIN D3) 1250 MCG
CAPSULE ORAL
COMMUNITY
End: 2024-08-27

## 2024-08-27 RX ORDER — FLUTICASONE PROPIONATE AND SALMETEROL XINAFOATE 115; 21 UG/1; UG/1
2 AEROSOL, METERED RESPIRATORY (INHALATION) 2 TIMES DAILY
COMMUNITY
End: 2024-08-27 | Stop reason: SDUPTHER

## 2024-08-27 RX ORDER — LISINOPRIL AND HYDROCHLOROTHIAZIDE 12.5; 2 MG/1; MG/1
1 TABLET ORAL DAILY
Qty: 90 TABLET | Refills: 1 | Status: SHIPPED | OUTPATIENT
Start: 2024-08-27

## 2024-08-27 RX ORDER — OXYBUTYNIN CHLORIDE 5 MG/1
5 TABLET ORAL 3 TIMES DAILY
Qty: 270 TABLET | Refills: 1 | Status: SHIPPED | OUTPATIENT
Start: 2024-08-27

## 2024-08-27 RX ORDER — CETIRIZINE HYDROCHLORIDE 10 MG/1
10 TABLET ORAL DAILY
Qty: 90 TABLET | Refills: 3 | Status: SHIPPED | OUTPATIENT
Start: 2024-08-27

## 2024-08-27 RX ORDER — LANCETS 33 GAUGE
1 EACH MISCELLANEOUS
COMMUNITY
End: 2024-08-27 | Stop reason: SDUPTHER

## 2024-08-27 RX ORDER — MELOXICAM 7.5 MG/1
7.5 TABLET ORAL DAILY
COMMUNITY
End: 2024-08-27

## 2024-08-27 RX ORDER — SERTRALINE HYDROCHLORIDE 100 MG/1
100 TABLET, FILM COATED ORAL DAILY
Qty: 90 TABLET | Refills: 1 | Status: SHIPPED | OUTPATIENT
Start: 2024-08-27

## 2024-08-27 RX ORDER — DULOXETIN HYDROCHLORIDE 60 MG/1
60 CAPSULE, DELAYED RELEASE ORAL NIGHTLY
Qty: 90 CAPSULE | Refills: 1 | Status: SHIPPED | OUTPATIENT
Start: 2024-08-27

## 2024-08-27 RX ORDER — LEVOTHYROXINE SODIUM 75 UG/1
75 TABLET ORAL DAILY
Qty: 90 TABLET | Refills: 1 | Status: SHIPPED | OUTPATIENT
Start: 2024-08-27

## 2024-08-27 RX ORDER — GLUCOSAMINE HCL/CHONDROITIN SU 500-400 MG
1 CAPSULE ORAL DAILY
Qty: 100 STRIP | Refills: 1 | Status: SHIPPED | OUTPATIENT
Start: 2024-08-27

## 2024-08-27 SDOH — ECONOMIC STABILITY: FOOD INSECURITY: WITHIN THE PAST 12 MONTHS, THE FOOD YOU BOUGHT JUST DIDN'T LAST AND YOU DIDN'T HAVE MONEY TO GET MORE.: NEVER TRUE

## 2024-08-27 SDOH — ECONOMIC STABILITY: FOOD INSECURITY: WITHIN THE PAST 12 MONTHS, YOU WORRIED THAT YOUR FOOD WOULD RUN OUT BEFORE YOU GOT MONEY TO BUY MORE.: NEVER TRUE

## 2024-08-27 SDOH — ECONOMIC STABILITY: INCOME INSECURITY: HOW HARD IS IT FOR YOU TO PAY FOR THE VERY BASICS LIKE FOOD, HOUSING, MEDICAL CARE, AND HEATING?: NOT HARD AT ALL

## 2024-08-27 ASSESSMENT — PATIENT HEALTH QUESTIONNAIRE - PHQ9
SUM OF ALL RESPONSES TO PHQ QUESTIONS 1-9: 1
1. LITTLE INTEREST OR PLEASURE IN DOING THINGS: NOT AT ALL
SUM OF ALL RESPONSES TO PHQ QUESTIONS 1-9: 1
2. FEELING DOWN, DEPRESSED OR HOPELESS: SEVERAL DAYS
SUM OF ALL RESPONSES TO PHQ9 QUESTIONS 1 & 2: 1

## 2024-08-27 NOTE — PROGRESS NOTES
2024    Kimberly Cardenas    Chief Complaint   Patient presents with    New Patient     Patient is here to establish care as a new patient and her previous pcp was Dr Altawil.    Health Maintenance     Colon-3 years-Dr jaimes, mammo-1.5 years  jackie, dm-need referral, dm eye-3 months ago       HPI  History was obtained from patient.  Kimberly is a 68 y.o. female who presents today with the followin. Type 2 diabetes mellitus with stage 3a chronic kidney disease, without long-term current use of insulin (HCC)    2. Essential hypertension    3. Chronic obstructive pulmonary disease, unspecified COPD type (HCC)    4. Mixed hyperlipidemia    5. Hyperlipidemia, unspecified hyperlipidemia type    6. Encounter for screening mammogram for malignant neoplasm of breast    7. Fibromyalgia    8. Seasonal allergies    9. Hypothyroidism, unspecified type    10. Gastroesophageal reflux disease, unspecified whether esophagitis present    11. Overactive bladder    12. Depression, unspecified depression type    13. Vitamin D deficiency       Patient presents to establish primary care.  Patient has a history of diabetes that is well-controlled.  She also has hyperlipidemia fibromyalgia hypothyroidism GERD bladder depression and vitamin D deficiency.  Patient states she is taking all of her medications as prescribed.  She has no acute complaints today.  She would like to get a screening mammogram.    REVIEW OF SYMPTOMS    Review of Systems   Constitutional:  Negative for chills, fatigue and fever.   HENT:  Negative for congestion, mouth sores, postnasal drip, rhinorrhea and sinus pain.    Eyes:  Negative for photophobia, discharge and redness.   Respiratory:  Negative for cough and shortness of breath.    Cardiovascular:  Negative for chest pain.   Gastrointestinal: Negative.    Genitourinary:  Negative for difficulty urinating, dysuria, hematuria and urgency.   Neurological:  Negative for headaches.   Psychiatric/Behavioral:

## 2024-09-03 ENCOUNTER — HOSPITAL ENCOUNTER (OUTPATIENT)
Age: 69
Discharge: HOME | End: 2024-09-03
Payer: MEDICARE

## 2024-09-03 DIAGNOSIS — E03.9 HYPOTHYROIDISM, UNSPECIFIED TYPE: ICD-10-CM

## 2024-09-03 DIAGNOSIS — N18.31 TYPE 2 DIABETES MELLITUS WITH STAGE 3A CHRONIC KIDNEY DISEASE, WITHOUT LONG-TERM CURRENT USE OF INSULIN (HCC): ICD-10-CM

## 2024-09-03 DIAGNOSIS — E78.2 MIXED HYPERLIPIDEMIA: ICD-10-CM

## 2024-09-03 DIAGNOSIS — E11.22 TYPE 2 DIABETES MELLITUS WITH STAGE 3A CHRONIC KIDNEY DISEASE, WITHOUT LONG-TERM CURRENT USE OF INSULIN (HCC): ICD-10-CM

## 2024-09-03 LAB
ALBUMIN SERPL-MCNC: 4.1 G/DL (ref 3.5–5.2)
ALP SERPL-CCNC: 93 U/L (ref 35–104)
ALT SERPL-CCNC: 11 U/L (ref 0–32)
ANION GAP SERPL CALCULATED.3IONS-SCNC: 8 MMOL/L (ref 7–16)
AST SERPL-CCNC: 15 U/L (ref 0–31)
BILIRUB SERPL-MCNC: 0.4 MG/DL (ref 0–1.2)
BUN SERPL-MCNC: 20 MG/DL (ref 6–23)
CALCIUM SERPL-MCNC: 9.6 MG/DL (ref 8.6–10.2)
CHLORIDE SERPL-SCNC: 96 MMOL/L (ref 98–107)
CHOLEST SERPL-MCNC: 123 MG/DL
CO2 SERPL-SCNC: 28 MMOL/L (ref 22–29)
CREAT SERPL-MCNC: 1.2 MG/DL (ref 0.5–1)
CREAT UR-MCNC: 49.1 MG/DL (ref 29–226)
GFR, ESTIMATED: 48 ML/MIN/1.73M2
GLUCOSE SERPL-MCNC: 119 MG/DL (ref 74–99)
HDLC SERPL-MCNC: 49 MG/DL
LDLC SERPL CALC-MCNC: 55 MG/DL
MICROALBUMIN UR-MCNC: <12 MG/L (ref 0–19)
MICROALBUMIN/CREAT UR-RTO: NORMAL MCG/MG CREAT (ref 0–30)
POTASSIUM SERPL-SCNC: 4.4 MMOL/L (ref 3.5–5)
PROT SERPL-MCNC: 7.1 G/DL (ref 6.4–8.3)
SODIUM SERPL-SCNC: 132 MMOL/L (ref 132–146)
TRIGL SERPL-MCNC: 97 MG/DL
TSH SERPL DL<=0.05 MIU/L-ACNC: 2.76 UIU/ML (ref 0.27–4.2)
VLDLC SERPL CALC-MCNC: 19 MG/DL

## 2024-09-03 PROCEDURE — 82570 ASSAY OF URINE CREATININE: CPT

## 2024-09-03 PROCEDURE — 36415 COLL VENOUS BLD VENIPUNCTURE: CPT

## 2024-09-03 PROCEDURE — 82043 UR ALBUMIN QUANTITATIVE: CPT

## 2024-09-03 PROCEDURE — 84443 ASSAY THYROID STIM HORMONE: CPT

## 2024-09-03 PROCEDURE — 80061 LIPID PANEL: CPT

## 2024-09-03 PROCEDURE — 80053 COMPREHEN METABOLIC PANEL: CPT

## 2024-09-04 ENCOUNTER — HOSPITAL ENCOUNTER (OUTPATIENT)
Dept: MAMMOGRAPHY | Age: 69
Discharge: HOME | End: 2024-09-06
Payer: MEDICARE

## 2024-09-04 VITALS — HEIGHT: 61 IN | BODY MASS INDEX: 37.76 KG/M2 | WEIGHT: 200 LBS

## 2024-09-04 DIAGNOSIS — Z12.31 ENCOUNTER FOR SCREENING MAMMOGRAM FOR MALIGNANT NEOPLASM OF BREAST: ICD-10-CM

## 2024-09-04 PROCEDURE — 77063 BREAST TOMOSYNTHESIS BI: CPT

## 2024-09-04 ASSESSMENT — ENCOUNTER SYMPTOMS
GASTROINTESTINAL NEGATIVE: 1
PHOTOPHOBIA: 0
COUGH: 0
EYE DISCHARGE: 0
RHINORRHEA: 0
SHORTNESS OF BREATH: 0
SINUS PAIN: 0
EYE REDNESS: 0

## 2024-10-04 ENCOUNTER — APPOINTMENT (OUTPATIENT)
Dept: GENERAL RADIOLOGY | Age: 69
End: 2024-10-04
Payer: MEDICARE

## 2024-10-04 ENCOUNTER — HOSPITAL ENCOUNTER (EMERGENCY)
Age: 69
Discharge: HOME | End: 2024-10-04
Payer: MEDICARE

## 2024-10-04 VITALS
SYSTOLIC BLOOD PRESSURE: 113 MMHG | OXYGEN SATURATION: 100 % | HEART RATE: 66 BPM | DIASTOLIC BLOOD PRESSURE: 59 MMHG | BODY MASS INDEX: 36.84 KG/M2 | RESPIRATION RATE: 20 BRPM | TEMPERATURE: 97.3 F | WEIGHT: 195 LBS

## 2024-10-04 DIAGNOSIS — S46.911A STRAIN OF RIGHT UPPER ARM, INITIAL ENCOUNTER: Primary | ICD-10-CM

## 2024-10-04 PROCEDURE — 99211 OFF/OP EST MAY X REQ PHY/QHP: CPT

## 2024-10-04 PROCEDURE — 73060 X-RAY EXAM OF HUMERUS: CPT

## 2024-10-04 RX ORDER — METHYLPREDNISOLONE 4 MG
TABLET, DOSE PACK ORAL
Qty: 1 KIT | Refills: 0 | Status: SHIPPED | OUTPATIENT
Start: 2024-10-04

## 2024-10-04 ASSESSMENT — PAIN DESCRIPTION - FREQUENCY: FREQUENCY: INTERMITTENT

## 2024-10-04 ASSESSMENT — PAIN DESCRIPTION - DESCRIPTORS: DESCRIPTORS: ACHING;SHOOTING;SPASM

## 2024-10-04 ASSESSMENT — PAIN SCALES - GENERAL: PAINLEVEL_OUTOF10: 5

## 2024-10-04 ASSESSMENT — PAIN - FUNCTIONAL ASSESSMENT: PAIN_FUNCTIONAL_ASSESSMENT: 0-10

## 2024-10-04 ASSESSMENT — PAIN DESCRIPTION - LOCATION: LOCATION: SHOULDER;ELBOW

## 2024-12-03 ENCOUNTER — OFFICE VISIT (OUTPATIENT)
Dept: FAMILY MEDICINE CLINIC | Age: 69
End: 2024-12-03
Payer: MEDICARE

## 2024-12-03 VITALS
OXYGEN SATURATION: 98 % | DIASTOLIC BLOOD PRESSURE: 76 MMHG | HEIGHT: 61 IN | HEART RATE: 77 BPM | SYSTOLIC BLOOD PRESSURE: 138 MMHG | BODY MASS INDEX: 37.19 KG/M2 | RESPIRATION RATE: 16 BRPM | WEIGHT: 197 LBS

## 2024-12-03 DIAGNOSIS — N32.81 OVERACTIVE BLADDER: ICD-10-CM

## 2024-12-03 DIAGNOSIS — N18.31 TYPE 2 DIABETES MELLITUS WITH STAGE 3A CHRONIC KIDNEY DISEASE, WITHOUT LONG-TERM CURRENT USE OF INSULIN (HCC): ICD-10-CM

## 2024-12-03 DIAGNOSIS — I10 ESSENTIAL HYPERTENSION: ICD-10-CM

## 2024-12-03 DIAGNOSIS — F32.A DEPRESSION, UNSPECIFIED DEPRESSION TYPE: ICD-10-CM

## 2024-12-03 DIAGNOSIS — E78.5 HYPERLIPIDEMIA, UNSPECIFIED HYPERLIPIDEMIA TYPE: ICD-10-CM

## 2024-12-03 DIAGNOSIS — E11.22 TYPE 2 DIABETES MELLITUS WITH STAGE 3A CHRONIC KIDNEY DISEASE, WITHOUT LONG-TERM CURRENT USE OF INSULIN (HCC): ICD-10-CM

## 2024-12-03 DIAGNOSIS — Z00.00 WELCOME TO MEDICARE PREVENTIVE VISIT: Primary | ICD-10-CM

## 2024-12-03 DIAGNOSIS — M79.7 FIBROMYALGIA: ICD-10-CM

## 2024-12-03 LAB — HBA1C MFR BLD: 6.3 %

## 2024-12-03 PROCEDURE — 1159F MED LIST DOCD IN RCRD: CPT | Performed by: FAMILY MEDICINE

## 2024-12-03 PROCEDURE — 2022F DILAT RTA XM EVC RTNOPTHY: CPT | Performed by: FAMILY MEDICINE

## 2024-12-03 PROCEDURE — G8417 CALC BMI ABV UP PARAM F/U: HCPCS | Performed by: FAMILY MEDICINE

## 2024-12-03 PROCEDURE — 1160F RVW MEDS BY RX/DR IN RCRD: CPT | Performed by: FAMILY MEDICINE

## 2024-12-03 PROCEDURE — 1123F ACP DISCUSS/DSCN MKR DOCD: CPT | Performed by: FAMILY MEDICINE

## 2024-12-03 PROCEDURE — 3017F COLORECTAL CA SCREEN DOC REV: CPT | Performed by: FAMILY MEDICINE

## 2024-12-03 PROCEDURE — 83036 HEMOGLOBIN GLYCOSYLATED A1C: CPT | Performed by: FAMILY MEDICINE

## 2024-12-03 PROCEDURE — 90653 IIV ADJUVANT VACCINE IM: CPT | Performed by: FAMILY MEDICINE

## 2024-12-03 PROCEDURE — 4004F PT TOBACCO SCREEN RCVD TLK: CPT | Performed by: FAMILY MEDICINE

## 2024-12-03 PROCEDURE — 3078F DIAST BP <80 MM HG: CPT | Performed by: FAMILY MEDICINE

## 2024-12-03 PROCEDURE — G0402 INITIAL PREVENTIVE EXAM: HCPCS | Performed by: FAMILY MEDICINE

## 2024-12-03 PROCEDURE — G8399 PT W/DXA RESULTS DOCUMENT: HCPCS | Performed by: FAMILY MEDICINE

## 2024-12-03 PROCEDURE — 1090F PRES/ABSN URINE INCON ASSESS: CPT | Performed by: FAMILY MEDICINE

## 2024-12-03 PROCEDURE — G8482 FLU IMMUNIZE ORDER/ADMIN: HCPCS | Performed by: FAMILY MEDICINE

## 2024-12-03 PROCEDURE — 3044F HG A1C LEVEL LT 7.0%: CPT | Performed by: FAMILY MEDICINE

## 2024-12-03 PROCEDURE — G8427 DOCREV CUR MEDS BY ELIG CLIN: HCPCS | Performed by: FAMILY MEDICINE

## 2024-12-03 PROCEDURE — G0008 ADMIN INFLUENZA VIRUS VAC: HCPCS | Performed by: FAMILY MEDICINE

## 2024-12-03 PROCEDURE — 3075F SYST BP GE 130 - 139MM HG: CPT | Performed by: FAMILY MEDICINE

## 2024-12-03 PROCEDURE — 99214 OFFICE O/P EST MOD 30 MIN: CPT | Performed by: FAMILY MEDICINE

## 2024-12-03 RX ORDER — METOPROLOL TARTRATE 25 MG/1
25 TABLET, FILM COATED ORAL NIGHTLY
Qty: 180 TABLET | Refills: 1 | Status: SHIPPED | OUTPATIENT
Start: 2024-12-03

## 2024-12-03 RX ORDER — SERTRALINE HYDROCHLORIDE 100 MG/1
100 TABLET, FILM COATED ORAL DAILY
Qty: 90 TABLET | Refills: 1 | Status: SHIPPED | OUTPATIENT
Start: 2024-12-03

## 2024-12-03 RX ORDER — BUPROPION HYDROCHLORIDE 150 MG/1
150 TABLET ORAL NIGHTLY
Qty: 90 TABLET | Refills: 1 | Status: SHIPPED | OUTPATIENT
Start: 2024-12-03

## 2024-12-03 RX ORDER — DULOXETIN HYDROCHLORIDE 60 MG/1
60 CAPSULE, DELAYED RELEASE ORAL NIGHTLY
Qty: 90 CAPSULE | Refills: 1 | Status: SHIPPED | OUTPATIENT
Start: 2024-12-03

## 2024-12-03 RX ORDER — EZETIMIBE 10 MG/1
10 TABLET ORAL DAILY
Qty: 90 TABLET | Refills: 1 | Status: SHIPPED | OUTPATIENT
Start: 2024-12-03

## 2024-12-03 RX ORDER — OXYBUTYNIN CHLORIDE 5 MG/1
5 TABLET ORAL 2 TIMES DAILY
Qty: 180 TABLET | Refills: 1 | Status: SHIPPED | OUTPATIENT
Start: 2024-12-03

## 2024-12-03 RX ORDER — LOVASTATIN 40 MG/1
40 TABLET ORAL NIGHTLY
Qty: 90 TABLET | Refills: 1 | Status: SHIPPED | OUTPATIENT
Start: 2024-12-03

## 2024-12-03 ASSESSMENT — PATIENT HEALTH QUESTIONNAIRE - PHQ9
10. IF YOU CHECKED OFF ANY PROBLEMS, HOW DIFFICULT HAVE THESE PROBLEMS MADE IT FOR YOU TO DO YOUR WORK, TAKE CARE OF THINGS AT HOME, OR GET ALONG WITH OTHER PEOPLE: SOMEWHAT DIFFICULT
4. FEELING TIRED OR HAVING LITTLE ENERGY: SEVERAL DAYS
8. MOVING OR SPEAKING SO SLOWLY THAT OTHER PEOPLE COULD HAVE NOTICED. OR THE OPPOSITE, BEING SO FIGETY OR RESTLESS THAT YOU HAVE BEEN MOVING AROUND A LOT MORE THAN USUAL: NOT AT ALL
SUM OF ALL RESPONSES TO PHQ9 QUESTIONS 1 & 2: 2
SUM OF ALL RESPONSES TO PHQ QUESTIONS 1-9: 5
5. POOR APPETITE OR OVEREATING: NOT AT ALL
9. THOUGHTS THAT YOU WOULD BE BETTER OFF DEAD, OR OF HURTING YOURSELF: NOT AT ALL
3. TROUBLE FALLING OR STAYING ASLEEP: SEVERAL DAYS
SUM OF ALL RESPONSES TO PHQ QUESTIONS 1-9: 5
6. FEELING BAD ABOUT YOURSELF - OR THAT YOU ARE A FAILURE OR HAVE LET YOURSELF OR YOUR FAMILY DOWN: SEVERAL DAYS
1. LITTLE INTEREST OR PLEASURE IN DOING THINGS: SEVERAL DAYS
7. TROUBLE CONCENTRATING ON THINGS, SUCH AS READING THE NEWSPAPER OR WATCHING TELEVISION: NOT AT ALL
2. FEELING DOWN, DEPRESSED OR HOPELESS: SEVERAL DAYS

## 2024-12-03 ASSESSMENT — LIFESTYLE VARIABLES
HOW MANY STANDARD DRINKS CONTAINING ALCOHOL DO YOU HAVE ON A TYPICAL DAY: PATIENT DOES NOT DRINK
HOW OFTEN DO YOU HAVE A DRINK CONTAINING ALCOHOL: NEVER

## 2024-12-03 NOTE — PROGRESS NOTES
increase risk for falls     Depression:  PHQ-2 Score: 2  PHQ-9 Total Score: 5  Total Score Interpretation: 5-9 = mild depression  Interventions:  Patient states she is doing well with her current medications including Wellbutrin  mg daily and Cymbalta 60 mg daily.          General HRA Questions:  Select all that apply: (!) New or Increased Fatigue, Stress  Interventions Fatigue:  Patient declined any further interventions or treatment  Interventions - Stress:  Patient declined any further interventions or treatment      Inactivity:  On average, how many days per week do you engage in moderate to strenuous exercise (like a brisk walk)?: 2 days (!) Abnormal  On average, how many minutes do you engage in exercise at this level?: 10 min  Interventions:  Patient declined any further interventions or treatment     Abnormal BMI (obese):  Body mass index is 37.22 kg/m². (!) Abnormal  Interventions:  Patient declines any further evaluation or treatment         Hearing Screen:  Do you or your family notice any trouble with your hearing that hasn't been managed with hearing aids?: (!) Yes    Interventions:  Patient declines any further evaluation or treatment    Vision Screen:  Visual Acuity screen is abnormal due to a score of 20/25 or worse.  Interventions:   Patient encouraged to make appointment with their eye specialist       Tobacco Use:    Tobacco Use      Smoking status: Every Day        Types: E-Cigarettes      Smokeless tobacco: Never     Interventions:  Patient declined any further intervention or treatment                      Objective   Vision Screening    Right eye Left eye Both eyes   Without correction      With correction 20/40 20/40 20/30      Vitals:    12/03/24 1043   BP: 138/76   Pulse: 77   Resp: 16   SpO2: 98%   Weight: 89.4 kg (197 lb)   Height: 1.549 m (5' 1\")      Body mass index is 37.22 kg/m².                  Allergies   Allergen Reactions    Other      Enviromental      Codeine Nausea And

## 2024-12-11 ASSESSMENT — ENCOUNTER SYMPTOMS
RHINORRHEA: 0
SHORTNESS OF BREATH: 0
SINUS PAIN: 0
EYE REDNESS: 0
COUGH: 0
PHOTOPHOBIA: 0
GASTROINTESTINAL NEGATIVE: 1
EYE DISCHARGE: 0

## 2025-01-07 NOTE — TELEPHONE ENCOUNTER
Prior Authorization Form:      DEMOGRAPHICS:                     Patient Name:  Lyric La  Patient :  1955            Insurance:  Payor: Jesus Osborn / Plan: [employer] / Product Type: *No Product type* /   Insurance ID Number:    Payer/Plan Subscr  Sex Relation Sub. Ins. ID Effective Group Num   1. FIDEL Torres 1955 Female Self [id number] 1/1/15 [id number]                                   [address]         DIAGNOSIS & PROCEDURE:                       Procedure/Operation: right knee total knee arthroplasty           CPT Code: 48671    Diagnosis:  right knee DJD    ICD10 Code: M17.11    Location:  [facility]    Surgeon:   John Lucio    SCHEDULING INFORMATION:                          Date: 22    Time: To Follow              Anesthesia:  Spinal                                                       Status:  Outpatient        Special Comments:  [facility] and Chelsi Grimes       Electronically signed by Quan Brito ATC on 2022 at 1:48 PM Will the [facility] specialty pharmacy in  dispense guardian sensors?

## 2025-02-25 DIAGNOSIS — I10 ESSENTIAL HYPERTENSION: ICD-10-CM

## 2025-02-25 DIAGNOSIS — E78.5 HYPERLIPIDEMIA, UNSPECIFIED HYPERLIPIDEMIA TYPE: ICD-10-CM

## 2025-02-25 DIAGNOSIS — K21.9 GASTROESOPHAGEAL REFLUX DISEASE, UNSPECIFIED WHETHER ESOPHAGITIS PRESENT: ICD-10-CM

## 2025-02-25 DIAGNOSIS — E11.22 TYPE 2 DIABETES MELLITUS WITH STAGE 3A CHRONIC KIDNEY DISEASE, WITHOUT LONG-TERM CURRENT USE OF INSULIN (HCC): ICD-10-CM

## 2025-02-25 DIAGNOSIS — N18.31 TYPE 2 DIABETES MELLITUS WITH STAGE 3A CHRONIC KIDNEY DISEASE, WITHOUT LONG-TERM CURRENT USE OF INSULIN (HCC): ICD-10-CM

## 2025-02-25 DIAGNOSIS — J30.2 SEASONAL ALLERGIES: ICD-10-CM

## 2025-02-25 DIAGNOSIS — E03.9 HYPOTHYROIDISM, UNSPECIFIED TYPE: ICD-10-CM

## 2025-02-25 DIAGNOSIS — N32.81 OVERACTIVE BLADDER: ICD-10-CM

## 2025-02-26 RX ORDER — OXYBUTYNIN CHLORIDE 5 MG/1
5 TABLET ORAL 3 TIMES DAILY
Qty: 270 TABLET | Refills: 1 | Status: SHIPPED | OUTPATIENT
Start: 2025-02-26

## 2025-02-26 RX ORDER — LEVOTHYROXINE SODIUM 75 UG/1
75 TABLET ORAL DAILY
Qty: 90 TABLET | Refills: 1 | Status: SHIPPED | OUTPATIENT
Start: 2025-02-26

## 2025-02-26 RX ORDER — OMEPRAZOLE 20 MG/1
CAPSULE, DELAYED RELEASE ORAL DAILY
Qty: 90 CAPSULE | Refills: 1 | Status: SHIPPED | OUTPATIENT
Start: 2025-02-26

## 2025-02-26 RX ORDER — LOVASTATIN 40 MG/1
TABLET ORAL
Qty: 180 TABLET | Refills: 1 | Status: SHIPPED | OUTPATIENT
Start: 2025-02-26

## 2025-02-26 RX ORDER — MONTELUKAST SODIUM 10 MG/1
10 TABLET ORAL NIGHTLY
Qty: 90 TABLET | Refills: 1 | Status: SHIPPED | OUTPATIENT
Start: 2025-02-26

## 2025-02-26 RX ORDER — LISINOPRIL AND HYDROCHLOROTHIAZIDE 12.5; 2 MG/1; MG/1
1 TABLET ORAL DAILY
Qty: 90 TABLET | Refills: 1 | Status: SHIPPED | OUTPATIENT
Start: 2025-02-26

## 2025-02-27 ENCOUNTER — HOSPITAL ENCOUNTER (EMERGENCY)
Age: 70
Discharge: HOME | End: 2025-02-27
Payer: MEDICARE

## 2025-02-27 VITALS
HEART RATE: 68 BPM | RESPIRATION RATE: 18 BRPM | BODY MASS INDEX: 34.96 KG/M2 | SYSTOLIC BLOOD PRESSURE: 115 MMHG | WEIGHT: 185 LBS | DIASTOLIC BLOOD PRESSURE: 57 MMHG | OXYGEN SATURATION: 99 % | TEMPERATURE: 97.9 F

## 2025-02-27 DIAGNOSIS — R68.89 FLU-LIKE SYMPTOMS: Primary | ICD-10-CM

## 2025-02-27 DIAGNOSIS — Z20.828 EXPOSURE TO INFLUENZA: ICD-10-CM

## 2025-02-27 PROCEDURE — 99211 OFF/OP EST MAY X REQ PHY/QHP: CPT

## 2025-02-27 RX ORDER — GUAIFENESIN/DEXTROMETHORPHAN 100-10MG/5
5 SYRUP ORAL 3 TIMES DAILY PRN
Qty: 120 ML | Refills: 0 | Status: SHIPPED | OUTPATIENT
Start: 2025-02-27 | End: 2025-03-09

## 2025-02-27 RX ORDER — ONDANSETRON 4 MG/1
4 TABLET, ORALLY DISINTEGRATING ORAL 3 TIMES DAILY PRN
Qty: 21 TABLET | Refills: 0 | Status: SHIPPED | OUTPATIENT
Start: 2025-02-27

## 2025-02-27 ASSESSMENT — PAIN DESCRIPTION - LOCATION: LOCATION: OTHER (COMMENT)

## 2025-02-27 ASSESSMENT — PAIN SCALES - GENERAL: PAINLEVEL_OUTOF10: 6

## 2025-02-27 ASSESSMENT — PAIN DESCRIPTION - FREQUENCY: FREQUENCY: CONTINUOUS

## 2025-02-27 ASSESSMENT — PAIN - FUNCTIONAL ASSESSMENT: PAIN_FUNCTIONAL_ASSESSMENT: 0-10

## 2025-02-27 ASSESSMENT — PAIN DESCRIPTION - DESCRIPTORS: DESCRIPTORS: ACHING;DISCOMFORT;DULL

## 2025-02-27 NOTE — ED PROVIDER NOTES
Independent TANISHA Visit.    HPI:  2/27/25,   Time: 11:15 AM REID Cardenas is a 69 y.o. female presenting to the ED for flulike symptoms.  Patient started to have symptoms 4 days ago.  She reports body aches, chills, sore throat, cough dry heaving and fatigue.  She reports her  was recently diagnosed with influenza A and so was her sister.  Her cough is sometimes productive with whitish mucus but most the time dry.  She has been taking DayQuil for symptoms with mild relief.  Has not taken her temperature at home.  Denies fevers that she knows of, headache, dizziness, chest pain, shortness of breath, abdominal pain, nausea vomiting or diarrhea.    ROS:   Pertinent positives and negatives are stated within HPI, all other systems reviewed and are negative.  --------------------------------------------- PAST HISTORY ---------------------------------------------  Past Medical History:  has a past medical history of Anxiety, Arthritis, Asthma, Atrial fibrillation (HCC), Breast mass, Chronic kidney disease, Chronic sinusitis, Colon polyps, COPD (chronic obstructive pulmonary disease) (HCC), Depression, Diabetes mellitus (HCC), Diabetic polyneuropathy (HCC), Diverticulitis, Diverticulosis, Fibromyalgia, GERD (gastroesophageal reflux disease), Headache, Hyperlipidemia, Hypertension, Hypothyroidism, Mitral valve prolapse, Neuropathy, Obesity, Osteoarthritis, Overactive thyroid gland, Shortness of breath, Sleep apnea, and Thyroid disease.    Past Surgical History:  has a past surgical history that includes Abdominal exploration surgery; Tonsillectomy (1972); bronchoscopy (10/2013); Appendectomy (1968); Ovary removal (10/1995); Upper gastrointestinal endoscopy (N/A, 01/23/2020); Cholecystectomy, laparoscopic (N/A, 05/12/2020); Colonoscopy; Carpal tunnel release; Knee arthroscopy (Left, 08/04/2021); Total knee arthroplasty (Left, 11/10/2021); Total knee arthroplasty (Right, 08/31/2022); and joint replacement (Left

## 2025-06-16 PROCEDURE — 88305 TISSUE EXAM BY PATHOLOGIST: CPT | Performed by: DERMATOLOGY

## 2025-06-17 ENCOUNTER — LAB REQUISITION (OUTPATIENT)
Dept: DERMATOPATHOLOGY | Facility: CLINIC | Age: 70
End: 2025-06-17
Payer: COMMERCIAL

## 2025-06-17 DIAGNOSIS — L98.8 OTHER SPECIFIED DISORDERS OF THE SKIN AND SUBCUTANEOUS TISSUE: ICD-10-CM

## 2025-06-18 LAB
LABORATORY COMMENT REPORT: NORMAL
PATH REPORT.FINAL DX SPEC: NORMAL
PATH REPORT.GROSS SPEC: NORMAL
PATH REPORT.MICROSCOPIC SPEC OTHER STN: NORMAL
PATH REPORT.RELEVANT HX SPEC: NORMAL
PATH REPORT.TOTAL CANCER: NORMAL

## 2025-07-17 ENCOUNTER — OFFICE VISIT (OUTPATIENT)
Dept: FAMILY MEDICINE CLINIC | Age: 70
End: 2025-07-17

## 2025-07-17 VITALS
OXYGEN SATURATION: 97 % | DIASTOLIC BLOOD PRESSURE: 52 MMHG | WEIGHT: 184 LBS | HEIGHT: 61 IN | TEMPERATURE: 96.8 F | SYSTOLIC BLOOD PRESSURE: 94 MMHG | HEART RATE: 64 BPM | BODY MASS INDEX: 34.74 KG/M2

## 2025-07-17 DIAGNOSIS — F32.A DEPRESSION, UNSPECIFIED DEPRESSION TYPE: ICD-10-CM

## 2025-07-17 DIAGNOSIS — N32.81 OVERACTIVE BLADDER: ICD-10-CM

## 2025-07-17 DIAGNOSIS — I10 ESSENTIAL HYPERTENSION: ICD-10-CM

## 2025-07-17 DIAGNOSIS — J44.9 CHRONIC OBSTRUCTIVE PULMONARY DISEASE, UNSPECIFIED COPD TYPE (HCC): ICD-10-CM

## 2025-07-17 DIAGNOSIS — K21.9 GASTROESOPHAGEAL REFLUX DISEASE, UNSPECIFIED WHETHER ESOPHAGITIS PRESENT: ICD-10-CM

## 2025-07-17 DIAGNOSIS — Z12.11 ENCOUNTER FOR SCREENING COLONOSCOPY FOR NON-HIGH-RISK PATIENT: ICD-10-CM

## 2025-07-17 DIAGNOSIS — N18.31 TYPE 2 DIABETES MELLITUS WITH STAGE 3A CHRONIC KIDNEY DISEASE, WITHOUT LONG-TERM CURRENT USE OF INSULIN (HCC): ICD-10-CM

## 2025-07-17 DIAGNOSIS — Z91.81 AT HIGH RISK FOR FALLS: ICD-10-CM

## 2025-07-17 DIAGNOSIS — E78.5 HYPERLIPIDEMIA, UNSPECIFIED HYPERLIPIDEMIA TYPE: ICD-10-CM

## 2025-07-17 DIAGNOSIS — E11.22 TYPE 2 DIABETES MELLITUS WITH STAGE 3A CHRONIC KIDNEY DISEASE, WITHOUT LONG-TERM CURRENT USE OF INSULIN (HCC): ICD-10-CM

## 2025-07-17 DIAGNOSIS — E03.9 HYPOTHYROIDISM, UNSPECIFIED TYPE: ICD-10-CM

## 2025-07-17 DIAGNOSIS — M79.7 FIBROMYALGIA: Primary | ICD-10-CM

## 2025-07-17 DIAGNOSIS — J30.2 SEASONAL ALLERGIES: ICD-10-CM

## 2025-07-17 RX ORDER — SERTRALINE HYDROCHLORIDE 100 MG/1
100 TABLET, FILM COATED ORAL DAILY
Qty: 90 TABLET | Refills: 1 | Status: SHIPPED | OUTPATIENT
Start: 2025-07-17

## 2025-07-17 RX ORDER — LOVASTATIN 40 MG/1
40 TABLET ORAL NIGHTLY
Qty: 90 TABLET | Refills: 1 | Status: SHIPPED | OUTPATIENT
Start: 2025-07-17

## 2025-07-17 RX ORDER — GABAPENTIN 800 MG/1
800 TABLET ORAL 3 TIMES DAILY
Qty: 270 TABLET | Refills: 1 | Status: SHIPPED | OUTPATIENT
Start: 2025-07-17 | End: 2026-01-13

## 2025-07-17 RX ORDER — LEVOTHYROXINE SODIUM 75 UG/1
75 TABLET ORAL DAILY
Qty: 90 TABLET | Refills: 1 | Status: SHIPPED | OUTPATIENT
Start: 2025-07-17

## 2025-07-17 RX ORDER — CETIRIZINE HYDROCHLORIDE 10 MG/1
10 TABLET ORAL DAILY
Qty: 90 TABLET | Refills: 3 | Status: SHIPPED | OUTPATIENT
Start: 2025-07-17

## 2025-07-17 RX ORDER — EZETIMIBE 10 MG/1
10 TABLET ORAL DAILY
Qty: 90 TABLET | Refills: 1 | Status: SHIPPED | OUTPATIENT
Start: 2025-07-17

## 2025-07-17 RX ORDER — GLUCOSAMINE HCL/CHONDROITIN SU 500-400 MG
1 CAPSULE ORAL DAILY
Qty: 100 STRIP | Refills: 1 | Status: SHIPPED | OUTPATIENT
Start: 2025-07-17

## 2025-07-17 RX ORDER — FLUTICASONE PROPIONATE AND SALMETEROL XINAFOATE 115; 21 UG/1; UG/1
2 AEROSOL, METERED RESPIRATORY (INHALATION) 2 TIMES DAILY
Qty: 12 G | Refills: 1 | Status: SHIPPED | OUTPATIENT
Start: 2025-07-17

## 2025-07-17 RX ORDER — DULOXETIN HYDROCHLORIDE 60 MG/1
60 CAPSULE, DELAYED RELEASE ORAL NIGHTLY
Qty: 90 CAPSULE | Refills: 1 | Status: SHIPPED | OUTPATIENT
Start: 2025-07-17

## 2025-07-17 RX ORDER — BUPROPION HYDROCHLORIDE 150 MG/1
150 TABLET ORAL NIGHTLY
Qty: 90 TABLET | Refills: 1 | Status: SHIPPED | OUTPATIENT
Start: 2025-07-17

## 2025-07-17 RX ORDER — OMEPRAZOLE 20 MG/1
20 CAPSULE, DELAYED RELEASE ORAL DAILY
Qty: 90 CAPSULE | Refills: 1 | Status: SHIPPED | OUTPATIENT
Start: 2025-07-17

## 2025-07-17 RX ORDER — FLUTICASONE PROPIONATE 50 MCG
1 SPRAY, SUSPENSION (ML) NASAL DAILY
Qty: 1 EACH | Refills: 3 | Status: SHIPPED | OUTPATIENT
Start: 2025-07-17

## 2025-07-17 RX ORDER — ONDANSETRON 4 MG/1
4 TABLET, ORALLY DISINTEGRATING ORAL 3 TIMES DAILY PRN
Qty: 21 TABLET | Refills: 0 | Status: SHIPPED | OUTPATIENT
Start: 2025-07-17

## 2025-07-17 RX ORDER — OXYBUTYNIN CHLORIDE 5 MG/1
5 TABLET ORAL 3 TIMES DAILY
Qty: 270 TABLET | Refills: 1 | Status: SHIPPED | OUTPATIENT
Start: 2025-07-17

## 2025-07-17 RX ORDER — LISINOPRIL AND HYDROCHLOROTHIAZIDE 12.5; 2 MG/1; MG/1
1 TABLET ORAL DAILY
Qty: 90 TABLET | Refills: 1 | Status: SHIPPED | OUTPATIENT
Start: 2025-07-17

## 2025-07-17 SDOH — ECONOMIC STABILITY: FOOD INSECURITY: WITHIN THE PAST 12 MONTHS, THE FOOD YOU BOUGHT JUST DIDN'T LAST AND YOU DIDN'T HAVE MONEY TO GET MORE.: NEVER TRUE

## 2025-07-17 SDOH — ECONOMIC STABILITY: FOOD INSECURITY: WITHIN THE PAST 12 MONTHS, YOU WORRIED THAT YOUR FOOD WOULD RUN OUT BEFORE YOU GOT MONEY TO BUY MORE.: NEVER TRUE

## 2025-07-17 ASSESSMENT — PATIENT HEALTH QUESTIONNAIRE - PHQ9
10. IF YOU CHECKED OFF ANY PROBLEMS, HOW DIFFICULT HAVE THESE PROBLEMS MADE IT FOR YOU TO DO YOUR WORK, TAKE CARE OF THINGS AT HOME, OR GET ALONG WITH OTHER PEOPLE: NOT DIFFICULT AT ALL
9. THOUGHTS THAT YOU WOULD BE BETTER OFF DEAD, OR OF HURTING YOURSELF: NOT AT ALL
SUM OF ALL RESPONSES TO PHQ QUESTIONS 1-9: 2
3. TROUBLE FALLING OR STAYING ASLEEP: SEVERAL DAYS
4. FEELING TIRED OR HAVING LITTLE ENERGY: SEVERAL DAYS
7. TROUBLE CONCENTRATING ON THINGS, SUCH AS READING THE NEWSPAPER OR WATCHING TELEVISION: NOT AT ALL
8. MOVING OR SPEAKING SO SLOWLY THAT OTHER PEOPLE COULD HAVE NOTICED. OR THE OPPOSITE, BEING SO FIGETY OR RESTLESS THAT YOU HAVE BEEN MOVING AROUND A LOT MORE THAN USUAL: NOT AT ALL
6. FEELING BAD ABOUT YOURSELF - OR THAT YOU ARE A FAILURE OR HAVE LET YOURSELF OR YOUR FAMILY DOWN: NOT AT ALL
SUM OF ALL RESPONSES TO PHQ QUESTIONS 1-9: 2
2. FEELING DOWN, DEPRESSED OR HOPELESS: NOT AT ALL
1. LITTLE INTEREST OR PLEASURE IN DOING THINGS: NOT AT ALL
SUM OF ALL RESPONSES TO PHQ QUESTIONS 1-9: 2
5. POOR APPETITE OR OVEREATING: NOT AT ALL
SUM OF ALL RESPONSES TO PHQ QUESTIONS 1-9: 2

## 2025-07-17 NOTE — PROGRESS NOTES
2025    Kimberly Cardenas    Chief Complaint   Patient presents with    Hypertension    COPD    Hyperlipidemia    Health Maintenance     Here for recheck on Hypertension,Copd and Hyperlipidemia.       HPI  History was obtained from patient.  Kimberly is a 69 y.o. female who presents today with the followin. Fibromyalgia    2. Chronic obstructive pulmonary disease, unspecified COPD type (HCC)    3. Type 2 diabetes mellitus with stage 3a chronic kidney disease, without long-term current use of insulin (HCC)    4. Depression, unspecified depression type    5. Seasonal allergies    6. Hyperlipidemia, unspecified hyperlipidemia type    7. Hypothyroidism, unspecified type    8. Essential hypertension    9. Gastroesophageal reflux disease, unspecified whether esophagitis present    10. Overactive bladder    11. Encounter for screening colonoscopy for non-high-risk patient    12. At high risk for falls       Patient presents for follow-up of chronic medical problems listed above.  Patient states she is taking all of her medication with the exception of the metformin she has no acute complaints today.  REVIEW OF SYMPTOMS    Review of Systems   Constitutional:  Negative for chills, fatigue and fever.   HENT:  Negative for congestion, mouth sores, postnasal drip, rhinorrhea and sinus pain.    Eyes:  Negative for photophobia, discharge and redness.   Respiratory:  Negative for cough and shortness of breath.    Cardiovascular:  Negative for chest pain.   Gastrointestinal: Negative.    Genitourinary:  Negative for difficulty urinating, dysuria, hematuria and urgency.   Neurological:  Negative for headaches.   Psychiatric/Behavioral:  Negative for sleep disturbance.        PAST MEDICAL HISTORY  Past Medical History:   Diagnosis Date    Anxiety     Arthritis     back, cervical neck    Asthma     Atrial fibrillation (HCC)     Breast mass     fibercystic     Chronic kidney disease     Chronic sinusitis     Colon polyps

## 2025-08-01 ENCOUNTER — APPOINTMENT (OUTPATIENT)
Dept: CT IMAGING | Age: 70
End: 2025-08-01
Payer: MEDICARE

## 2025-08-01 ENCOUNTER — HOSPITAL ENCOUNTER (OUTPATIENT)
Age: 70
Setting detail: OBSERVATION
Discharge: HOME | End: 2025-08-03
Attending: STUDENT IN AN ORGANIZED HEALTH CARE EDUCATION/TRAINING PROGRAM | Admitting: FAMILY MEDICINE
Payer: MEDICARE

## 2025-08-01 ENCOUNTER — APPOINTMENT (OUTPATIENT)
Dept: GENERAL RADIOLOGY | Age: 70
End: 2025-08-01
Payer: MEDICARE

## 2025-08-01 DIAGNOSIS — R07.9 CHEST PAIN, UNSPECIFIED TYPE: Primary | ICD-10-CM

## 2025-08-01 DIAGNOSIS — R91.1 LUNG NODULE: ICD-10-CM

## 2025-08-01 DIAGNOSIS — N17.9 AKI (ACUTE KIDNEY INJURY): ICD-10-CM

## 2025-08-01 DIAGNOSIS — M25.512 ACUTE PAIN OF LEFT SHOULDER: ICD-10-CM

## 2025-08-01 LAB
ALBUMIN SERPL-MCNC: 4.1 G/DL (ref 3.5–5.2)
ALP SERPL-CCNC: 98 U/L (ref 35–104)
ALT SERPL-CCNC: 8 U/L (ref 0–35)
ANION GAP SERPL CALCULATED.3IONS-SCNC: 15 MMOL/L (ref 7–16)
AST SERPL-CCNC: 18 U/L (ref 0–35)
BASOPHILS # BLD: 0.06 K/UL (ref 0–0.2)
BASOPHILS NFR BLD: 1 % (ref 0–2)
BILIRUB SERPL-MCNC: 0.4 MG/DL (ref 0–1.2)
BNP SERPL-MCNC: <36 PG/ML (ref 0–125)
BUN SERPL-MCNC: 26 MG/DL (ref 8–23)
CALCIUM SERPL-MCNC: 9.4 MG/DL (ref 8.8–10.2)
CHLORIDE SERPL-SCNC: 99 MMOL/L (ref 98–107)
CO2 SERPL-SCNC: 21 MMOL/L (ref 22–29)
CREAT SERPL-MCNC: 1.7 MG/DL (ref 0.5–1)
EOSINOPHIL # BLD: 0.24 K/UL (ref 0.05–0.5)
EOSINOPHILS RELATIVE PERCENT: 2 % (ref 0–6)
ERYTHROCYTE [DISTWIDTH] IN BLOOD BY AUTOMATED COUNT: 12.3 % (ref 11.5–15)
GFR, ESTIMATED: 32 ML/MIN/1.73M2
GLUCOSE SERPL-MCNC: 126 MG/DL (ref 74–99)
HCT VFR BLD AUTO: 38.4 % (ref 34–48)
HGB BLD-MCNC: 12.8 G/DL (ref 11.5–15.5)
IMM GRANULOCYTES # BLD AUTO: 0.05 K/UL (ref 0–0.58)
IMM GRANULOCYTES NFR BLD: 1 % (ref 0–5)
LYMPHOCYTES NFR BLD: 2.6 K/UL (ref 1.5–4)
LYMPHOCYTES RELATIVE PERCENT: 27 % (ref 20–42)
MAGNESIUM SERPL-MCNC: 1.9 MG/DL (ref 1.6–2.4)
MCH RBC QN AUTO: 30.8 PG (ref 26–35)
MCHC RBC AUTO-ENTMCNC: 33.3 G/DL (ref 32–34.5)
MCV RBC AUTO: 92.5 FL (ref 80–99.9)
MONOCYTES NFR BLD: 0.63 K/UL (ref 0.1–0.95)
MONOCYTES NFR BLD: 6 % (ref 2–12)
NEUTROPHILS NFR BLD: 64 % (ref 43–80)
NEUTS SEG NFR BLD: 6.24 K/UL (ref 1.8–7.3)
PLATELET CONFIRMATION: NORMAL
PLATELET, FLUORESCENCE: 329 K/UL (ref 130–450)
PMV BLD AUTO: 9.8 FL (ref 7–12)
POTASSIUM SERPL-SCNC: 4.1 MMOL/L (ref 3.5–5.1)
PROT SERPL-MCNC: 7.3 G/DL (ref 6.4–8.3)
RBC # BLD AUTO: 4.15 M/UL (ref 3.5–5.5)
SODIUM SERPL-SCNC: 136 MMOL/L (ref 136–145)
TROPONIN I SERPL HS-MCNC: 8 NG/L (ref 0–14)
TROPONIN I SERPL HS-MCNC: 8 NG/L (ref 0–14)
WBC OTHER # BLD: 9.8 K/UL (ref 4.5–11.5)

## 2025-08-01 PROCEDURE — 85025 COMPLETE CBC W/AUTO DIFF WBC: CPT

## 2025-08-01 PROCEDURE — 83735 ASSAY OF MAGNESIUM: CPT

## 2025-08-01 PROCEDURE — 2580000003 HC RX 258

## 2025-08-01 PROCEDURE — 99285 EMERGENCY DEPT VISIT HI MDM: CPT

## 2025-08-01 PROCEDURE — 83880 ASSAY OF NATRIURETIC PEPTIDE: CPT

## 2025-08-01 PROCEDURE — 80053 COMPREHEN METABOLIC PANEL: CPT

## 2025-08-01 PROCEDURE — 84484 ASSAY OF TROPONIN QUANT: CPT

## 2025-08-01 PROCEDURE — 6360000004 HC RX CONTRAST MEDICATION: Performed by: RADIOLOGY

## 2025-08-01 PROCEDURE — 71275 CT ANGIOGRAPHY CHEST: CPT

## 2025-08-01 PROCEDURE — 71045 X-RAY EXAM CHEST 1 VIEW: CPT

## 2025-08-01 PROCEDURE — 93005 ELECTROCARDIOGRAM TRACING: CPT

## 2025-08-01 RX ORDER — 0.9 % SODIUM CHLORIDE 0.9 %
1000 INTRAVENOUS SOLUTION INTRAVENOUS ONCE
Status: COMPLETED | OUTPATIENT
Start: 2025-08-01 | End: 2025-08-01

## 2025-08-01 RX ORDER — IOPAMIDOL 755 MG/ML
75 INJECTION, SOLUTION INTRAVASCULAR
Status: COMPLETED | OUTPATIENT
Start: 2025-08-01 | End: 2025-08-01

## 2025-08-01 RX ADMIN — SODIUM CHLORIDE 1000 ML: 0.9 INJECTION, SOLUTION INTRAVENOUS at 20:28

## 2025-08-01 RX ADMIN — IOPAMIDOL 75 ML: 755 INJECTION, SOLUTION INTRAVENOUS at 22:13

## 2025-08-01 ASSESSMENT — LIFESTYLE VARIABLES
HOW OFTEN DO YOU HAVE A DRINK CONTAINING ALCOHOL: NEVER
HOW MANY STANDARD DRINKS CONTAINING ALCOHOL DO YOU HAVE ON A TYPICAL DAY: PATIENT DOES NOT DRINK

## 2025-08-02 ENCOUNTER — APPOINTMENT (OUTPATIENT)
Age: 70
End: 2025-08-02
Attending: INTERNAL MEDICINE
Payer: MEDICARE

## 2025-08-02 ENCOUNTER — APPOINTMENT (OUTPATIENT)
Dept: NUCLEAR MEDICINE | Age: 70
End: 2025-08-02
Payer: MEDICARE

## 2025-08-02 ENCOUNTER — APPOINTMENT (OUTPATIENT)
Age: 70
End: 2025-08-02
Payer: MEDICARE

## 2025-08-02 PROBLEM — M25.512 ACUTE PAIN OF LEFT SHOULDER: Status: ACTIVE | Noted: 2025-08-02

## 2025-08-02 PROBLEM — N17.9 AKI (ACUTE KIDNEY INJURY): Status: ACTIVE | Noted: 2025-08-02

## 2025-08-02 PROBLEM — R91.1 LUNG NODULE: Status: ACTIVE | Noted: 2025-08-02

## 2025-08-02 PROBLEM — R07.9 CHEST PAIN: Status: ACTIVE | Noted: 2025-08-02

## 2025-08-02 LAB
ECHO AO ASC DIAM: 2.8 CM
ECHO AO ASCENDING AORTA INDEX: 1.53 CM/M2
ECHO AV AREA PEAK VELOCITY: 2.1 CM2
ECHO AV AREA VTI: 2.8 CM2
ECHO AV AREA/BSA PEAK VELOCITY: 1.1 CM2/M2
ECHO AV AREA/BSA VTI: 1.5 CM2/M2
ECHO AV CUSP MM: 1.6 CM
ECHO AV MEAN GRADIENT: 5 MMHG
ECHO AV MEAN VELOCITY: 1 M/S
ECHO AV PEAK GRADIENT: 10 MMHG
ECHO AV PEAK VELOCITY: 1.6 M/S
ECHO AV VELOCITY RATIO: 0.69
ECHO AV VTI: 30.3 CM
ECHO BSA: 1.9 M2
ECHO EST RA PRESSURE: 3 MMHG
ECHO LA DIAMETER INDEX: 1.26 CM/M2
ECHO LA DIAMETER: 2.3 CM
ECHO LA VOL A-L A2C: 17 ML (ref 22–52)
ECHO LA VOL A-L A4C: 36 ML (ref 22–52)
ECHO LA VOL BP: 26 ML (ref 22–52)
ECHO LA VOL MOD A2C: 16 ML (ref 22–52)
ECHO LA VOL MOD A4C: 32 ML (ref 22–52)
ECHO LA VOL/BSA BIPLANE: 14 ML/M2 (ref 16–34)
ECHO LA VOLUME AREA LENGTH: 28 ML
ECHO LA VOLUME INDEX A-L A2C: 9 ML/M2 (ref 16–34)
ECHO LA VOLUME INDEX A-L A4C: 20 ML/M2 (ref 16–34)
ECHO LA VOLUME INDEX AREA LENGTH: 15 ML/M2 (ref 16–34)
ECHO LA VOLUME INDEX MOD A2C: 9 ML/M2 (ref 16–34)
ECHO LA VOLUME INDEX MOD A4C: 17 ML/M2 (ref 16–34)
ECHO LV EDV A2C: 57 ML
ECHO LV EDV A4C: 63 ML
ECHO LV EDV BP: 60 ML (ref 56–104)
ECHO LV EDV INDEX A4C: 34 ML/M2
ECHO LV EDV INDEX BP: 33 ML/M2
ECHO LV EDV NDEX A2C: 31 ML/M2
ECHO LV EF PHYSICIAN: 69 %
ECHO LV EJECTION FRACTION A2C: 62 %
ECHO LV EJECTION FRACTION A4C: 76 %
ECHO LV EJECTION FRACTION BIPLANE: 69 % (ref 55–100)
ECHO LV ESV A2C: 21 ML
ECHO LV ESV A4C: 15 ML
ECHO LV ESV BP: 19 ML (ref 19–49)
ECHO LV ESV INDEX A2C: 11 ML/M2
ECHO LV ESV INDEX A4C: 8 ML/M2
ECHO LV ESV INDEX BP: 10 ML/M2
ECHO LV FRACTIONAL SHORTENING: 33 % (ref 28–44)
ECHO LV INTERNAL DIMENSION DIASTOLE INDEX: 2.19 CM/M2
ECHO LV INTERNAL DIMENSION DIASTOLIC: 4 CM (ref 3.9–5.3)
ECHO LV INTERNAL DIMENSION SYSTOLIC INDEX: 1.48 CM/M2
ECHO LV INTERNAL DIMENSION SYSTOLIC: 2.7 CM
ECHO LV ISOVOLUMETRIC RELAXATION TIME (IVRT): 76.1 MS
ECHO LV IVSD: 1.1 CM (ref 0.6–0.9)
ECHO LV IVSS: 1.2 CM
ECHO LV MASS 2D: 127.1 G (ref 67–162)
ECHO LV MASS INDEX 2D: 69.4 G/M2 (ref 43–95)
ECHO LV POSTERIOR WALL DIASTOLIC: 0.9 CM (ref 0.6–0.9)
ECHO LV POSTERIOR WALL SYSTOLIC: 1.2 CM
ECHO LV RELATIVE WALL THICKNESS RATIO: 0.45
ECHO LVOT AREA: 3.1 CM2
ECHO LVOT AV VTI INDEX: 0.94
ECHO LVOT DIAM: 2 CM
ECHO LVOT MEAN GRADIENT: 2 MMHG
ECHO LVOT PEAK GRADIENT: 5 MMHG
ECHO LVOT PEAK VELOCITY: 1.1 M/S
ECHO LVOT STROKE VOLUME INDEX: 48.7 ML/M2
ECHO LVOT SV: 89.2 ML
ECHO LVOT VTI: 28.4 CM
ECHO MV "A" WAVE DURATION: 152.2 MSEC
ECHO MV A VELOCITY: 0.91 M/S
ECHO MV AREA PHT: 3.1 CM2
ECHO MV AREA VTI: 3.1 CM2
ECHO MV E DECELERATION TIME (DT): 232.2 MS
ECHO MV E VELOCITY: 0.73 M/S
ECHO MV E/A RATIO: 0.8
ECHO MV LVOT VTI INDEX: 1.02
ECHO MV MAX VELOCITY: 1 M/S
ECHO MV MEAN GRADIENT: 1 MMHG
ECHO MV MEAN VELOCITY: 0.5 M/S
ECHO MV PEAK GRADIENT: 4 MMHG
ECHO MV PRESSURE HALF TIME (PHT): 71.8 MS
ECHO MV VTI: 28.9 CM
ECHO PV MAX VELOCITY: 1 M/S
ECHO PV MEAN GRADIENT: 2 MMHG
ECHO PV MEAN VELOCITY: 0.7 M/S
ECHO PV PEAK GRADIENT: 4 MMHG
ECHO PV VTI: 22.1 CM
ECHO RIGHT VENTRICULAR SYSTOLIC PRESSURE (RVSP): 26 MMHG
ECHO RV INTERNAL DIMENSION: 2.5 CM
ECHO RV LONGITUDINAL DIMENSION: 4.3 CM
ECHO RV MID DIMENSION: 2.5 CM
ECHO RVOT MEAN GRADIENT: 1 MMHG
ECHO RVOT PEAK GRADIENT: 2 MMHG
ECHO RVOT PEAK VELOCITY: 0.7 M/S
ECHO RVOT VTI: 12.3 CM
ECHO TV REGURGITANT MAX VELOCITY: 2.41 M/S
ECHO TV REGURGITANT PEAK GRADIENT: 23 MMHG
EKG ATRIAL RATE: 83 BPM
EKG P AXIS: 56 DEGREES
EKG P-R INTERVAL: 168 MS
EKG Q-T INTERVAL: 368 MS
EKG QRS DURATION: 86 MS
EKG QTC CALCULATION (BAZETT): 432 MS
EKG R AXIS: 105 DEGREES
EKG T AXIS: 48 DEGREES
EKG VENTRICULAR RATE: 83 BPM
GLUCOSE BLD-MCNC: 123 MG/DL (ref 74–99)
GLUCOSE BLD-MCNC: 124 MG/DL (ref 74–99)
GLUCOSE BLD-MCNC: 124 MG/DL (ref 74–99)

## 2025-08-02 PROCEDURE — 78452 HT MUSCLE IMAGE SPECT MULT: CPT

## 2025-08-02 PROCEDURE — 93017 CV STRESS TEST TRACING ONLY: CPT

## 2025-08-02 PROCEDURE — 6360000002 HC RX W HCPCS: Performed by: FAMILY MEDICINE

## 2025-08-02 PROCEDURE — G0378 HOSPITAL OBSERVATION PER HR: HCPCS

## 2025-08-02 PROCEDURE — 2500000003 HC RX 250 WO HCPCS: Performed by: FAMILY MEDICINE

## 2025-08-02 PROCEDURE — 82962 GLUCOSE BLOOD TEST: CPT

## 2025-08-02 PROCEDURE — A9500 TC99M SESTAMIBI: HCPCS | Performed by: RADIOLOGY

## 2025-08-02 PROCEDURE — 94640 AIRWAY INHALATION TREATMENT: CPT

## 2025-08-02 PROCEDURE — 93010 ELECTROCARDIOGRAM REPORT: CPT | Performed by: INTERNAL MEDICINE

## 2025-08-02 PROCEDURE — 93306 TTE W/DOPPLER COMPLETE: CPT

## 2025-08-02 PROCEDURE — 6370000000 HC RX 637 (ALT 250 FOR IP)

## 2025-08-02 PROCEDURE — 99223 1ST HOSP IP/OBS HIGH 75: CPT | Performed by: FAMILY MEDICINE

## 2025-08-02 PROCEDURE — 6370000000 HC RX 637 (ALT 250 FOR IP): Performed by: FAMILY MEDICINE

## 2025-08-02 PROCEDURE — 96372 THER/PROPH/DIAG INJ SC/IM: CPT

## 2025-08-02 PROCEDURE — 3430000000 HC RX DIAGNOSTIC RADIOPHARMACEUTICAL: Performed by: RADIOLOGY

## 2025-08-02 RX ORDER — OXYBUTYNIN CHLORIDE 5 MG/1
5 TABLET ORAL 3 TIMES DAILY
Status: DISCONTINUED | OUTPATIENT
Start: 2025-08-02 | End: 2025-08-03 | Stop reason: HOSPADM

## 2025-08-02 RX ORDER — SODIUM CHLORIDE 0.9 % (FLUSH) 0.9 %
5-40 SYRINGE (ML) INJECTION EVERY 12 HOURS SCHEDULED
Status: DISCONTINUED | OUTPATIENT
Start: 2025-08-02 | End: 2025-08-03 | Stop reason: HOSPADM

## 2025-08-02 RX ORDER — ACETAMINOPHEN 650 MG/1
650 SUPPOSITORY RECTAL EVERY 6 HOURS PRN
Status: DISCONTINUED | OUTPATIENT
Start: 2025-08-02 | End: 2025-08-03 | Stop reason: HOSPADM

## 2025-08-02 RX ORDER — BUPROPION HYDROCHLORIDE 150 MG/1
150 TABLET ORAL NIGHTLY
Status: DISCONTINUED | OUTPATIENT
Start: 2025-08-02 | End: 2025-08-03 | Stop reason: HOSPADM

## 2025-08-02 RX ORDER — GABAPENTIN 400 MG/1
800 CAPSULE ORAL 3 TIMES DAILY
Status: DISCONTINUED | OUTPATIENT
Start: 2025-08-02 | End: 2025-08-03 | Stop reason: HOSPADM

## 2025-08-02 RX ORDER — INSULIN LISPRO 100 [IU]/ML
0-4 INJECTION, SOLUTION INTRAVENOUS; SUBCUTANEOUS
Status: DISCONTINUED | OUTPATIENT
Start: 2025-08-02 | End: 2025-08-03 | Stop reason: HOSPADM

## 2025-08-02 RX ORDER — CETIRIZINE HYDROCHLORIDE 10 MG/1
10 TABLET ORAL DAILY
Status: DISCONTINUED | OUTPATIENT
Start: 2025-08-02 | End: 2025-08-03 | Stop reason: HOSPADM

## 2025-08-02 RX ORDER — DULOXETIN HYDROCHLORIDE 60 MG/1
60 CAPSULE, DELAYED RELEASE ORAL NIGHTLY
Status: DISCONTINUED | OUTPATIENT
Start: 2025-08-02 | End: 2025-08-03 | Stop reason: HOSPADM

## 2025-08-02 RX ORDER — ASPIRIN 81 MG/1
324 TABLET, CHEWABLE ORAL ONCE
Status: COMPLETED | OUTPATIENT
Start: 2025-08-02 | End: 2025-08-02

## 2025-08-02 RX ORDER — REGADENOSON 0.08 MG/ML
0.4 INJECTION, SOLUTION INTRAVENOUS
Status: COMPLETED | OUTPATIENT
Start: 2025-08-02 | End: 2025-08-03

## 2025-08-02 RX ORDER — ATORVASTATIN CALCIUM 10 MG/1
10 TABLET, FILM COATED ORAL DAILY
Status: DISCONTINUED | OUTPATIENT
Start: 2025-08-02 | End: 2025-08-03 | Stop reason: HOSPADM

## 2025-08-02 RX ORDER — LEVOTHYROXINE SODIUM 75 UG/1
75 TABLET ORAL DAILY
Status: DISCONTINUED | OUTPATIENT
Start: 2025-08-02 | End: 2025-08-03 | Stop reason: HOSPADM

## 2025-08-02 RX ORDER — SODIUM CHLORIDE 0.9 % (FLUSH) 0.9 %
5-40 SYRINGE (ML) INJECTION PRN
Status: DISCONTINUED | OUTPATIENT
Start: 2025-08-02 | End: 2025-08-03 | Stop reason: HOSPADM

## 2025-08-02 RX ORDER — ACETAMINOPHEN 325 MG/1
650 TABLET ORAL EVERY 6 HOURS PRN
Status: DISCONTINUED | OUTPATIENT
Start: 2025-08-02 | End: 2025-08-03 | Stop reason: HOSPADM

## 2025-08-02 RX ORDER — TETRAKIS(2-METHOXYISOBUTYLISOCYANIDE)COPPER(I) TETRAFLUOROBORATE 1 MG/ML
10 INJECTION, POWDER, LYOPHILIZED, FOR SOLUTION INTRAVENOUS
Status: COMPLETED | OUTPATIENT
Start: 2025-08-02 | End: 2025-08-02

## 2025-08-02 RX ORDER — EZETIMIBE 10 MG/1
10 TABLET ORAL DAILY
Status: DISCONTINUED | OUTPATIENT
Start: 2025-08-02 | End: 2025-08-03 | Stop reason: HOSPADM

## 2025-08-02 RX ORDER — ENOXAPARIN SODIUM 100 MG/ML
40 INJECTION SUBCUTANEOUS DAILY
Status: DISCONTINUED | OUTPATIENT
Start: 2025-08-02 | End: 2025-08-03 | Stop reason: HOSPADM

## 2025-08-02 RX ADMIN — OXYBUTYNIN CHLORIDE 5 MG: 5 TABLET ORAL at 20:33

## 2025-08-02 RX ADMIN — BUPROPION HYDROCHLORIDE 150 MG: 150 TABLET, EXTENDED RELEASE ORAL at 20:33

## 2025-08-02 RX ADMIN — LEVOTHYROXINE SODIUM 75 MCG: 0.03 TABLET ORAL at 11:11

## 2025-08-02 RX ADMIN — ARFORMOTEROL TARTRATE: 15 SOLUTION RESPIRATORY (INHALATION) at 19:44

## 2025-08-02 RX ADMIN — ASPIRIN 324 MG: 81 TABLET, CHEWABLE ORAL at 02:33

## 2025-08-02 RX ADMIN — ENOXAPARIN SODIUM 40 MG: 100 INJECTION SUBCUTANEOUS at 11:07

## 2025-08-02 RX ADMIN — GABAPENTIN 800 MG: 400 CAPSULE ORAL at 14:21

## 2025-08-02 RX ADMIN — ATORVASTATIN CALCIUM 10 MG: 10 TABLET, FILM COATED ORAL at 20:33

## 2025-08-02 RX ADMIN — SODIUM CHLORIDE, PRESERVATIVE FREE 10 ML: 5 INJECTION INTRAVENOUS at 20:34

## 2025-08-02 RX ADMIN — EZETIMIBE 10 MG: 10 TABLET ORAL at 11:18

## 2025-08-02 RX ADMIN — SERTRALINE 100 MG: 50 TABLET, FILM COATED ORAL at 11:12

## 2025-08-02 RX ADMIN — SODIUM CHLORIDE, PRESERVATIVE FREE 10 ML: 5 INJECTION INTRAVENOUS at 11:14

## 2025-08-02 RX ADMIN — OXYBUTYNIN CHLORIDE 5 MG: 5 TABLET ORAL at 14:21

## 2025-08-02 RX ADMIN — GABAPENTIN 800 MG: 400 CAPSULE ORAL at 20:33

## 2025-08-02 RX ADMIN — OXYBUTYNIN CHLORIDE 5 MG: 5 TABLET ORAL at 11:18

## 2025-08-02 RX ADMIN — Medication 10 MILLICURIE: at 08:31

## 2025-08-02 RX ADMIN — GABAPENTIN 800 MG: 400 CAPSULE ORAL at 11:11

## 2025-08-02 RX ADMIN — DULOXETINE 60 MG: 60 CAPSULE, DELAYED RELEASE ORAL at 20:33

## 2025-08-02 RX ADMIN — CETIRIZINE HYDROCHLORIDE 10 MG: 10 TABLET, FILM COATED ORAL at 11:12

## 2025-08-03 ENCOUNTER — HOSPITAL ENCOUNTER (EMERGENCY)
Age: 70
Discharge: HOME | End: 2025-08-04
Attending: EMERGENCY MEDICINE
Payer: MEDICARE

## 2025-08-03 VITALS
BODY MASS INDEX: 35.55 KG/M2 | RESPIRATION RATE: 10 BRPM | DIASTOLIC BLOOD PRESSURE: 57 MMHG | OXYGEN SATURATION: 94 % | HEIGHT: 61 IN | HEART RATE: 66 BPM | SYSTOLIC BLOOD PRESSURE: 122 MMHG | TEMPERATURE: 97.5 F | WEIGHT: 188.31 LBS

## 2025-08-03 DIAGNOSIS — F23 ACUTE PSYCHOSIS (HCC): Primary | ICD-10-CM

## 2025-08-03 DIAGNOSIS — F29 PSYCHOSIS, UNSPECIFIED PSYCHOSIS TYPE (HCC): ICD-10-CM

## 2025-08-03 LAB
ANION GAP SERPL CALCULATED.3IONS-SCNC: 12 MMOL/L (ref 7–16)
BASOPHILS # BLD: 0.05 K/UL (ref 0–0.2)
BASOPHILS NFR BLD: 1 % (ref 0–2)
BUN SERPL-MCNC: 28 MG/DL (ref 8–23)
CALCIUM SERPL-MCNC: 9.4 MG/DL (ref 8.8–10.2)
CHLORIDE SERPL-SCNC: 99 MMOL/L (ref 98–107)
CO2 SERPL-SCNC: 23 MMOL/L (ref 22–29)
CREAT SERPL-MCNC: 1.2 MG/DL (ref 0.5–1)
ECHO BSA: 1.92 M2
EOSINOPHIL # BLD: 0.28 K/UL (ref 0.05–0.5)
EOSINOPHILS RELATIVE PERCENT: 3 % (ref 0–6)
ERYTHROCYTE [DISTWIDTH] IN BLOOD BY AUTOMATED COUNT: 12.2 % (ref 11.5–15)
GFR, ESTIMATED: 49 ML/MIN/1.73M2
GLUCOSE BLD-MCNC: 120 MG/DL (ref 74–99)
GLUCOSE SERPL-MCNC: 114 MG/DL (ref 74–99)
HCT VFR BLD AUTO: 38.2 % (ref 34–48)
HGB BLD-MCNC: 12.4 G/DL (ref 11.5–15.5)
IMM GRANULOCYTES # BLD AUTO: 0.05 K/UL (ref 0–0.58)
IMM GRANULOCYTES NFR BLD: 1 % (ref 0–5)
LYMPHOCYTES NFR BLD: 1.65 K/UL (ref 1.5–4)
LYMPHOCYTES RELATIVE PERCENT: 20 % (ref 20–42)
MCH RBC QN AUTO: 30 PG (ref 26–35)
MCHC RBC AUTO-ENTMCNC: 32.5 G/DL (ref 32–34.5)
MCV RBC AUTO: 92.3 FL (ref 80–99.9)
MONOCYTES NFR BLD: 0.52 K/UL (ref 0.1–0.95)
MONOCYTES NFR BLD: 6 % (ref 2–12)
NEUTROPHILS NFR BLD: 69 % (ref 43–80)
NEUTS SEG NFR BLD: 5.58 K/UL (ref 1.8–7.3)
NUC STRESS EJECTION FRACTION: 93 %
PLATELET # BLD AUTO: 281 K/UL (ref 130–450)
PMV BLD AUTO: 9.7 FL (ref 7–12)
POTASSIUM SERPL-SCNC: 4.2 MMOL/L (ref 3.5–5.1)
RBC # BLD AUTO: 4.14 M/UL (ref 3.5–5.5)
SODIUM SERPL-SCNC: 134 MMOL/L (ref 136–145)
STRESS BASELINE DIAS BP: 68 MMHG
STRESS BASELINE HR: 74 BPM
STRESS BASELINE ST DEPRESSION: 0 MM
STRESS BASELINE SYS BP: 121 MMHG
STRESS O2 SAT REST: 93 %
STRESS ST DEPRESSION: 0 MM
STRESS STAGE 1 BP: NORMAL MMHG
STRESS STAGE 1 COMMENTS: NORMAL
STRESS STAGE 1 DURATION: 1 MIN:SEC
STRESS STAGE 1 HR: 80 BPM
STRESS STAGE RECOVERY 1 BP: NORMAL MMHG
STRESS STAGE RECOVERY 1 DURATION: 1 MIN:SEC
STRESS STAGE RECOVERY 1 HR: 90 BPM
STRESS STAGE RECOVERY 2 BP: NORMAL MMHG
STRESS STAGE RECOVERY 2 DURATION: 2 MIN:SEC
STRESS STAGE RECOVERY 2 HR: 86 BPM
STRESS STAGE RECOVERY 3 BP: NORMAL MMHG
STRESS STAGE RECOVERY 3 DURATION: 3 MIN:SEC
STRESS STAGE RECOVERY 3 HR: 86 BPM
STRESS STAGE RECOVERY 4 BP: NORMAL MMHG
STRESS STAGE RECOVERY 4 DURATION: 4 MIN:SEC
STRESS STAGE RECOVERY 4 HR: 87 BPM
STRESS TARGET HR: 152 BPM
WBC OTHER # BLD: 8.1 K/UL (ref 4.5–11.5)

## 2025-08-03 PROCEDURE — A9500 TC99M SESTAMIBI: HCPCS | Performed by: RADIOLOGY

## 2025-08-03 PROCEDURE — 99239 HOSP IP/OBS DSCHRG MGMT >30: CPT | Performed by: INTERNAL MEDICINE

## 2025-08-03 PROCEDURE — 6360000002 HC RX W HCPCS: Performed by: FAMILY MEDICINE

## 2025-08-03 PROCEDURE — 80048 BASIC METABOLIC PNL TOTAL CA: CPT

## 2025-08-03 PROCEDURE — 6370000000 HC RX 637 (ALT 250 FOR IP): Performed by: FAMILY MEDICINE

## 2025-08-03 PROCEDURE — 85025 COMPLETE CBC W/AUTO DIFF WBC: CPT

## 2025-08-03 PROCEDURE — 96372 THER/PROPH/DIAG INJ SC/IM: CPT

## 2025-08-03 PROCEDURE — 99284 EMERGENCY DEPT VISIT MOD MDM: CPT

## 2025-08-03 PROCEDURE — 78452 HT MUSCLE IMAGE SPECT MULT: CPT | Performed by: INTERNAL MEDICINE

## 2025-08-03 PROCEDURE — 6360000002 HC RX W HCPCS: Performed by: INTERNAL MEDICINE

## 2025-08-03 PROCEDURE — 82962 GLUCOSE BLOOD TEST: CPT

## 2025-08-03 PROCEDURE — G0378 HOSPITAL OBSERVATION PER HR: HCPCS

## 2025-08-03 PROCEDURE — 2500000003 HC RX 250 WO HCPCS: Performed by: FAMILY MEDICINE

## 2025-08-03 PROCEDURE — 93016 CV STRESS TEST SUPVJ ONLY: CPT | Performed by: INTERNAL MEDICINE

## 2025-08-03 PROCEDURE — 93018 CV STRESS TEST I&R ONLY: CPT | Performed by: INTERNAL MEDICINE

## 2025-08-03 PROCEDURE — 36415 COLL VENOUS BLD VENIPUNCTURE: CPT

## 2025-08-03 PROCEDURE — 3430000000 HC RX DIAGNOSTIC RADIOPHARMACEUTICAL: Performed by: RADIOLOGY

## 2025-08-03 PROCEDURE — 94640 AIRWAY INHALATION TREATMENT: CPT

## 2025-08-03 RX ORDER — TETRAKIS(2-METHOXYISOBUTYLISOCYANIDE)COPPER(I) TETRAFLUOROBORATE 1 MG/ML
30 INJECTION, POWDER, LYOPHILIZED, FOR SOLUTION INTRAVENOUS
Status: COMPLETED | OUTPATIENT
Start: 2025-08-03 | End: 2025-08-03

## 2025-08-03 RX ORDER — LORAZEPAM 0.5 MG/1
0.5 TABLET ORAL ONCE
Status: COMPLETED | OUTPATIENT
Start: 2025-08-04 | End: 2025-08-04

## 2025-08-03 RX ADMIN — Medication 30 MILLICURIE: at 10:57

## 2025-08-03 RX ADMIN — SERTRALINE 100 MG: 50 TABLET, FILM COATED ORAL at 08:40

## 2025-08-03 RX ADMIN — ARFORMOTEROL TARTRATE: 15 SOLUTION RESPIRATORY (INHALATION) at 06:08

## 2025-08-03 RX ADMIN — REGADENOSON 0.4 MG: 0.08 INJECTION, SOLUTION INTRAVENOUS at 10:30

## 2025-08-03 RX ADMIN — OXYBUTYNIN CHLORIDE 5 MG: 5 TABLET ORAL at 08:40

## 2025-08-03 RX ADMIN — SODIUM CHLORIDE, PRESERVATIVE FREE 10 ML: 5 INJECTION INTRAVENOUS at 08:40

## 2025-08-03 RX ADMIN — GABAPENTIN 800 MG: 400 CAPSULE ORAL at 14:53

## 2025-08-03 RX ADMIN — CETIRIZINE HYDROCHLORIDE 10 MG: 10 TABLET, FILM COATED ORAL at 08:40

## 2025-08-03 RX ADMIN — ENOXAPARIN SODIUM 40 MG: 100 INJECTION SUBCUTANEOUS at 08:39

## 2025-08-03 RX ADMIN — OXYBUTYNIN CHLORIDE 5 MG: 5 TABLET ORAL at 14:53

## 2025-08-03 RX ADMIN — GABAPENTIN 800 MG: 400 CAPSULE ORAL at 08:39

## 2025-08-03 RX ADMIN — EZETIMIBE 10 MG: 10 TABLET ORAL at 08:40

## 2025-08-04 ENCOUNTER — APPOINTMENT (OUTPATIENT)
Dept: CT IMAGING | Age: 70
End: 2025-08-04
Payer: MEDICARE

## 2025-08-04 ENCOUNTER — TELEPHONE (OUTPATIENT)
Dept: FAMILY MEDICINE CLINIC | Age: 70
End: 2025-08-04

## 2025-08-04 VITALS
HEART RATE: 78 BPM | DIASTOLIC BLOOD PRESSURE: 67 MMHG | WEIGHT: 188 LBS | RESPIRATION RATE: 18 BRPM | SYSTOLIC BLOOD PRESSURE: 129 MMHG | BODY MASS INDEX: 35.5 KG/M2 | TEMPERATURE: 97.7 F | HEIGHT: 61 IN | OXYGEN SATURATION: 94 %

## 2025-08-04 LAB
ALBUMIN SERPL-MCNC: 4.1 G/DL (ref 3.5–5.2)
ALP SERPL-CCNC: 94 U/L (ref 35–104)
ALT SERPL-CCNC: 9 U/L (ref 0–35)
AMPHET UR QL SCN: NEGATIVE
ANION GAP SERPL CALCULATED.3IONS-SCNC: 13 MMOL/L (ref 7–16)
APAP SERPL-MCNC: <5 UG/ML (ref 10–30)
AST SERPL-CCNC: 20 U/L (ref 0–35)
BARBITURATES UR QL SCN: NEGATIVE
BASOPHILS # BLD: 0.08 K/UL (ref 0–0.2)
BASOPHILS NFR BLD: 1 % (ref 0–2)
BENZODIAZ UR QL: NEGATIVE
BILIRUB SERPL-MCNC: 0.4 MG/DL (ref 0–1.2)
BILIRUB UR QL STRIP: NEGATIVE
BUN SERPL-MCNC: 26 MG/DL (ref 8–23)
BUPRENORPHINE UR QL: NEGATIVE
CALCIUM SERPL-MCNC: 9.6 MG/DL (ref 8.8–10.2)
CANNABINOIDS UR QL SCN: NEGATIVE
CHLORIDE SERPL-SCNC: 94 MMOL/L (ref 98–107)
CHP ED QC CHECK: YES
CLARITY UR: CLEAR
CO2 SERPL-SCNC: 23 MMOL/L (ref 22–29)
COCAINE UR QL SCN: NEGATIVE
COLOR UR: YELLOW
CREAT SERPL-MCNC: 1.4 MG/DL (ref 0.5–1)
EOSINOPHIL # BLD: 0.41 K/UL (ref 0.05–0.5)
EOSINOPHILS RELATIVE PERCENT: 4 % (ref 0–6)
ERYTHROCYTE [DISTWIDTH] IN BLOOD BY AUTOMATED COUNT: 12.2 % (ref 11.5–15)
ETHANOLAMINE SERPL-MCNC: <10 MG/DL (ref 0–0.08)
FENTANYL UR QL: NEGATIVE
GFR, ESTIMATED: 41 ML/MIN/1.73M2
GLUCOSE BLD-MCNC: 112 MG/DL
GLUCOSE BLD-MCNC: 112 MG/DL (ref 74–99)
GLUCOSE SERPL-MCNC: 89 MG/DL (ref 74–99)
GLUCOSE UR STRIP-MCNC: 250 MG/DL
HCT VFR BLD AUTO: 36 % (ref 34–48)
HGB BLD-MCNC: 12.4 G/DL (ref 11.5–15.5)
HGB UR QL STRIP.AUTO: NEGATIVE
IMM GRANULOCYTES # BLD AUTO: 0.06 K/UL (ref 0–0.58)
IMM GRANULOCYTES NFR BLD: 1 % (ref 0–5)
KETONES UR STRIP-MCNC: NEGATIVE MG/DL
LEUKOCYTE ESTERASE UR QL STRIP: NEGATIVE
LYMPHOCYTES NFR BLD: 2.69 K/UL (ref 1.5–4)
LYMPHOCYTES RELATIVE PERCENT: 23 % (ref 20–42)
MCH RBC QN AUTO: 31.2 PG (ref 26–35)
MCHC RBC AUTO-ENTMCNC: 34.4 G/DL (ref 32–34.5)
MCV RBC AUTO: 90.7 FL (ref 80–99.9)
METHADONE UR QL: NEGATIVE
MONOCYTES NFR BLD: 0.96 K/UL (ref 0.1–0.95)
MONOCYTES NFR BLD: 8 % (ref 2–12)
NEUTROPHILS NFR BLD: 64 % (ref 43–80)
NEUTS SEG NFR BLD: 7.62 K/UL (ref 1.8–7.3)
NITRITE UR QL STRIP: NEGATIVE
OPIATES UR QL SCN: NEGATIVE
OXYCODONE UR QL SCN: NEGATIVE
PCP UR QL SCN: NEGATIVE
PH UR STRIP: 5.5 [PH] (ref 5–8)
PLATELET # BLD AUTO: 289 K/UL (ref 130–450)
PMV BLD AUTO: 9.5 FL (ref 7–12)
POTASSIUM SERPL-SCNC: 4.2 MMOL/L (ref 3.5–5.1)
PROT SERPL-MCNC: 7.4 G/DL (ref 6.4–8.3)
PROT UR STRIP-MCNC: NEGATIVE MG/DL
RBC # BLD AUTO: 3.97 M/UL (ref 3.5–5.5)
RBC #/AREA URNS HPF: ABNORMAL /HPF
SALICYLATES SERPL-MCNC: <0.5 MG/DL (ref 0–30)
SODIUM SERPL-SCNC: 131 MMOL/L (ref 136–145)
SP GR UR STRIP: <1.005 (ref 1–1.03)
T4 FREE SERPL-MCNC: 1.1 NG/DL (ref 0.9–1.7)
TEST INFORMATION: NORMAL
TOXIC TRICYCLIC SC,BLOOD: NEGATIVE
TSH SERPL DL<=0.05 MIU/L-ACNC: 3.92 UIU/ML (ref 0.27–4.2)
UROBILINOGEN UR STRIP-ACNC: 0.2 EU/DL (ref 0–1)
WBC #/AREA URNS HPF: ABNORMAL /HPF
WBC OTHER # BLD: 11.8 K/UL (ref 4.5–11.5)

## 2025-08-04 PROCEDURE — 84439 ASSAY OF FREE THYROXINE: CPT

## 2025-08-04 PROCEDURE — 84443 ASSAY THYROID STIM HORMONE: CPT

## 2025-08-04 PROCEDURE — 6370000000 HC RX 637 (ALT 250 FOR IP)

## 2025-08-04 PROCEDURE — 80179 DRUG ASSAY SALICYLATE: CPT

## 2025-08-04 PROCEDURE — 80053 COMPREHEN METABOLIC PANEL: CPT

## 2025-08-04 PROCEDURE — 85025 COMPLETE CBC W/AUTO DIFF WBC: CPT

## 2025-08-04 PROCEDURE — 80307 DRUG TEST PRSMV CHEM ANLYZR: CPT

## 2025-08-04 PROCEDURE — 87086 URINE CULTURE/COLONY COUNT: CPT

## 2025-08-04 PROCEDURE — 82962 GLUCOSE BLOOD TEST: CPT

## 2025-08-04 PROCEDURE — 2580000003 HC RX 258: Performed by: EMERGENCY MEDICINE

## 2025-08-04 PROCEDURE — G0480 DRUG TEST DEF 1-7 CLASSES: HCPCS

## 2025-08-04 PROCEDURE — 93005 ELECTROCARDIOGRAM TRACING: CPT

## 2025-08-04 PROCEDURE — 81001 URINALYSIS AUTO W/SCOPE: CPT

## 2025-08-04 PROCEDURE — 80143 DRUG ASSAY ACETAMINOPHEN: CPT

## 2025-08-04 PROCEDURE — 70450 CT HEAD/BRAIN W/O DYE: CPT

## 2025-08-04 RX ORDER — 0.9 % SODIUM CHLORIDE 0.9 %
1000 INTRAVENOUS SOLUTION INTRAVENOUS ONCE
Status: COMPLETED | OUTPATIENT
Start: 2025-08-04 | End: 2025-08-04

## 2025-08-04 RX ADMIN — SODIUM CHLORIDE 1000 ML: 0.9 INJECTION, SOLUTION INTRAVENOUS at 01:49

## 2025-08-04 RX ADMIN — LORAZEPAM 0.5 MG: 0.5 TABLET ORAL at 01:07

## 2025-08-05 LAB
MICROORGANISM SPEC CULT: NO GROWTH
SERVICE CMNT-IMP: NORMAL
SPECIMEN DESCRIPTION: NORMAL

## 2025-08-06 DIAGNOSIS — J30.2 SEASONAL ALLERGIES: ICD-10-CM

## 2025-08-06 LAB
EKG ATRIAL RATE: 85 BPM
EKG P AXIS: 50 DEGREES
EKG P-R INTERVAL: 156 MS
EKG Q-T INTERVAL: 348 MS
EKG QRS DURATION: 80 MS
EKG QTC CALCULATION (BAZETT): 414 MS
EKG R AXIS: 67 DEGREES
EKG T AXIS: 59 DEGREES
EKG VENTRICULAR RATE: 85 BPM

## 2025-08-06 PROCEDURE — 93010 ELECTROCARDIOGRAM REPORT: CPT | Performed by: INTERNAL MEDICINE

## 2025-08-07 RX ORDER — AVOBENZONE, HOMOSALATE, OCTISALATE, OCTOCRYLENE 30; 40; 45; 26 MG/ML; MG/ML; MG/ML; MG/ML
1 CREAM TOPICAL DAILY
COMMUNITY
End: 2025-08-07 | Stop reason: SDUPTHER

## 2025-08-07 RX ORDER — BLOOD-GLUCOSE METER
EACH MISCELLANEOUS
Refills: 0 | OUTPATIENT
Start: 2025-08-07

## 2025-08-07 RX ORDER — GLUCOSAMINE HCL/CHONDROITIN SU 500-400 MG
1 CAPSULE ORAL DAILY
COMMUNITY
End: 2025-08-07 | Stop reason: SDUPTHER

## 2025-08-07 RX ORDER — BLOOD-GLUCOSE METER
1 KIT MISCELLANEOUS DAILY PRN
COMMUNITY
End: 2025-08-07 | Stop reason: SDUPTHER

## 2025-08-09 RX ORDER — GLUCOSAMINE HCL/CHONDROITIN SU 500-400 MG
1 CAPSULE ORAL DAILY
Qty: 100 STRIP | Refills: 1 | Status: SHIPPED | OUTPATIENT
Start: 2025-08-09

## 2025-08-09 RX ORDER — AVOBENZONE, HOMOSALATE, OCTISALATE, OCTOCRYLENE 30; 40; 45; 26 MG/ML; MG/ML; MG/ML; MG/ML
1 CREAM TOPICAL DAILY
Qty: 100 EACH | Refills: 1 | Status: SHIPPED | OUTPATIENT
Start: 2025-08-09

## 2025-08-09 RX ORDER — BLOOD-GLUCOSE METER
1 KIT MISCELLANEOUS DAILY
Qty: 1 KIT | Refills: 0 | Status: SHIPPED | OUTPATIENT
Start: 2025-08-09

## (undated) DEVICE — Z DISCONTINUED USE 2272124 DRAPE SURG XL N INVASIVE 2 LAYR DISP

## (undated) DEVICE — Z DISCONTINUED PER MEDLINE USE 2741944 DRESSING AQUACEL 12 IN SURG W9XL30CM SIL CVR WTRPRF VIR BACT BARR ANTIMIC

## (undated) DEVICE — NDL CNTR 40CT FM MAG: Brand: MEDLINE INDUSTRIES, INC.

## (undated) DEVICE — MARKER,SKIN,WI/RULER AND LABELS: Brand: MEDLINE

## (undated) DEVICE — SOLUTION SCRB 32OZ 7.5% POVIDONE IOD BTL GENTLE EFFECTIVE

## (undated) DEVICE — DOUBLE BASIN SET: Brand: MEDLINE INDUSTRIES, INC.

## (undated) DEVICE — GARMENT,MEDLINE,DVT,INT,CALF,MED, GEN2: Brand: MEDLINE

## (undated) DEVICE — PUMP SUC IRR TBNG L10FT W/ HNDPC ASSEMB STRYKEFLOW 2

## (undated) DEVICE — CONTAINER SPEC COLL 960ML POLYPR TRIANG GRAD INTAKE/OUTPUT

## (undated) DEVICE — Z DISCONTINUED NO SUB IDED BAG SPEC RETRV M C240ML MOUTH 7.3MM L17CM SHFT 10MM NYL EZEE

## (undated) DEVICE — APPLIER CLP M L L11.4IN DIA10MM ENDOSCP ROT MULT FOR LIG

## (undated) DEVICE — PEEL-AWAY HOOD: Brand: FLYTE, SURGICOOL

## (undated) DEVICE — T4 HOOD

## (undated) DEVICE — 20 ML SYRINGE REGULAR TIP: Brand: MONOJECT

## (undated) DEVICE — PMI PTFE COATED LAPAROSCOPIC WIRE L-HOOK 44 CM: Brand: PMI

## (undated) DEVICE — SYRINGE MED 50ML LUERLOCK TIP

## (undated) DEVICE — INSUFFLATION NEEDLE TO ESTABLISH PNEUMOPERITONEUM.: Brand: INSUFFLATION NEEDLE

## (undated) DEVICE — TUBING, SUCTION, 1/4" X 10', STRAIGHT: Brand: MEDLINE

## (undated) DEVICE — KENDALL 450 SERIES MONITORING FOAM ELECTRODE - RECTANGULAR SHAPE ( 3/PK): Brand: KENDALL

## (undated) DEVICE — Z DISCONTINUED USE 2516375 APPLICATOR MEDICATED 3 CC CLR STRL CHLORAPREP

## (undated) DEVICE — PAD,ABDOMINAL,8"X10",ST,LF: Brand: MEDLINE

## (undated) DEVICE — CANNULA IV 18GA L15IN BLNT FILL LUERLOCK HUB MJCT

## (undated) DEVICE — SYRINGE MED 10ML TRNSLUC BRL PLUNG BLK MRK POLYPR CTRL

## (undated) DEVICE — TOWEL,OR,DSP,ST,BLUE,STD,6/PK,12PK/CS: Brand: MEDLINE

## (undated) DEVICE — CONTAINER SPEC 480ML CLR POLYSTYR 10% NEUT BUFF FRMLN ZN

## (undated) DEVICE — GLOVE ORANGE PI 7 1/2   MSG9075

## (undated) DEVICE — BANDAGE COMPR W6INXL12FT SMOOTH FOR LIMB EXSANG ESMARCH

## (undated) DEVICE — MASK,FACE,MAXFLUIDPROTECT,SHIELD/ERLPS: Brand: MEDLINE

## (undated) DEVICE — SET ENDO INSTR RED YEL LAPAROSCOPIC

## (undated) DEVICE — HYPODERMIC SAFETY NEEDLE: Brand: MAGELLAN

## (undated) DEVICE — CAMERA STRYKER 1488 HD GEN

## (undated) DEVICE — SHEET SUPPORT

## (undated) DEVICE — GOWN,SIRUS,POLYRNF,RAGLAN,XL,ST,30/CS: Brand: MEDLINE

## (undated) DEVICE — SUTURE SUTTAPE L40IN DIA1.3MM NONABSORBABLE WHT BLU L26.5MM AR7500

## (undated) DEVICE — KIT BEDSIDE REVITAL OX 500ML

## (undated) DEVICE — COVER,TABLE,60X90,STERILE: Brand: MEDLINE

## (undated) DEVICE — SOLUTION IV IRRIG 500ML 0.9% SODIUM CHL 2F7123

## (undated) DEVICE — SHEET,DRAPE,40X58,STERILE: Brand: MEDLINE

## (undated) DEVICE — SET MAJOR INSTR ORTHO

## (undated) DEVICE — GOWN,SIRUS,NONRNF,SETINSLV,XL,20/CS: Brand: MEDLINE

## (undated) DEVICE — GOWN,SIRUS,FABRNF,XL,20/CS: Brand: MEDLINE

## (undated) DEVICE — GAUZE,SPONGE,4"X4",16PLY,STRL,LF,10/TRAY: Brand: MEDLINE

## (undated) DEVICE — APPLICATOR MEDICATED 26 CC SOLUTION HI LT ORNG CHLORAPREP

## (undated) DEVICE — BLOCK BITE 60FR CAREGUARD

## (undated) DEVICE — SPONGE LAP W18XL18IN WHT COT 4 PLY FLD STRUNG RADPQ DISP ST

## (undated) DEVICE — BLADE SAW SAG NAR THCK LNG 12.5MM

## (undated) DEVICE — 3M™ IOBAN™ 2 ANTIMICROBIAL INCISE DRAPE 6650EZ: Brand: IOBAN™ 2

## (undated) DEVICE — SKIN AFFIX SURG ADHESIVE 72/CS 0.55ML: Brand: MEDLINE

## (undated) DEVICE — PITCHER PT 1200ML W HNDL CSR WRP

## (undated) DEVICE — 3M™ IOBAN™ 2 ANTIMICROBIAL INCISE DRAPE 6640EZ: Brand: IOBAN™ 2

## (undated) DEVICE — NEEDLE HYPO 25GA L1.5IN BLU POLYPR HUB S STL REG BVL STR

## (undated) DEVICE — DRESSING HYDROFIBER AQUACEL AG ADVANTAGE 3.5X12 IN

## (undated) DEVICE — HANDPIECE SET WITH BONE CLEANING TIP: Brand: INTERPULSE

## (undated) DEVICE — BANDAGE COMPR W6INXL5YD SELF ADH COHESIVE CO FLX

## (undated) DEVICE — COVER,LIGHT HANDLE,FLX,1/PK: Brand: MEDLINE INDUSTRIES, INC.

## (undated) DEVICE — 3M™ STERI-DRAPE™ U-DRAPE 1015: Brand: STERI-DRAPE™

## (undated) DEVICE — PENCIL ES L3M BTTN SWCH HOLSTER W/ BLDE ELECTRD EDGE

## (undated) DEVICE — PMI PTFE COATED LAPAROSCOPIC WIRE L-HOOK 33 CM: Brand: PMI

## (undated) DEVICE — MEDI-VAC YANKAUER SUCTION HANDLE: Brand: CARDINAL HEALTH

## (undated) DEVICE — MEDIA CONTRAST ISOVUE GLASS VIALS 250 50ML

## (undated) DEVICE — NEPTUNE E-SEP SMOKE EVACUATION PENCIL, COATED, 70MM BLADE, PUSH BUTTON SWITCH: Brand: NEPTUNE E-SEP

## (undated) DEVICE — TROCAR: Brand: KII FIOS FIRST ENTRY

## (undated) DEVICE — Z DISCONTINUED USE 2275686 GLOVE SURG SZ 8 L12IN FNGR THK13MIL WHT ISOLEX POLYISOPRENE

## (undated) DEVICE — ELECTRODE PT RET AD L9FT HI MOIST COND ADH HYDRGEL CORDED

## (undated) DEVICE — TROCAR: Brand: KII SLEEVE

## (undated) DEVICE — TUBING PMP L16FT MAIN DISP FOR AR-6400 AR-6475

## (undated) DEVICE — GAUZE,SPONGE,4"X4",16PLY,XRAY,STRL,LF: Brand: MEDLINE

## (undated) DEVICE — [HIGH FLOW INSUFFLATOR,  DO NOT USE IF PACKAGE IS DAMAGED,  KEEP DRY,  KEEP AWAY FROM SUNLIGHT,  PROTECT FROM HEAT AND RADIOACTIVE SOURCES.]: Brand: PNEUMOSURE

## (undated) DEVICE — SOLUTION IV IRRIG POUR BRL 0.9% SODIUM CHL 2F7124

## (undated) DEVICE — PACK,EXTREMITY,ORTHOMAX,5/CS: Brand: MEDLINE

## (undated) DEVICE — SOLUTION IV 1000ML 0.9% SOD CHL PH 5 INJ USP VIAFLX PLAS

## (undated) DEVICE — SYRINGE IRRIG 60ML SFT PLIABLE BLB EZ TO GRP 1 HND USE W/

## (undated) DEVICE — Z INACTIVE USE 2660664 SOLUTION IRRIG 3000ML 0.9% SOD CHL USP UROMATIC PLAS CONT

## (undated) DEVICE — SET SURG INSTR DISSECT

## (undated) DEVICE — BANDAGE COMPR L W4INXL11YD 100% COT WVN E DBL LEN CLP CLSR

## (undated) DEVICE — SET ENDO INSTR LAPAROSCOPIC INCISIONAL

## (undated) DEVICE — VALVE SUCTION AIR H2O HYDR H2O JET CONN STRL ORCA POD + DISP

## (undated) DEVICE — NEEDLE SPNL L3.5IN PNK HUB S STL REG WALL FIT STYL W/ QNCKE

## (undated) DEVICE — GOWN,SIRUS,POLYRNF,BRTHSLV,XLN/XL,20/CS: Brand: MEDLINE

## (undated) DEVICE — INTENDED FOR TISSUE SEPARATION, AND OTHER PROCEDURES THAT REQUIRE A SHARP SURGICAL BLADE TO PUNCTURE OR CUT.: Brand: BARD-PARKER ® STAINLESS STEEL BLADES

## (undated) DEVICE — SPONGE GZ 4IN 4IN 4 PLY N WVN AVANT

## (undated) DEVICE — MEDI-VAC YANKAUER SUCTION HANDLE W/BULBOUS TIP: Brand: CARDINAL HEALTH

## (undated) DEVICE — PLUMEPORT LAPAROSCOPIC SMOKE FILTRATION DEVICE: Brand: PLUMEPORT ACTIV

## (undated) DEVICE — MEDI-VAC NON-CONDUCTIVE SUCTION TUBING: Brand: CARDINAL HEALTH

## (undated) DEVICE — STAPLER SKIN H3.9MM WIRE DIA0.58MM CRWN 6.9MM 35 STPL ROT

## (undated) DEVICE — PADDING,UNDERCAST,COTTON, 4"X4YD STERILE: Brand: MEDLINE

## (undated) DEVICE — NEEDLE FLTR 18GA L1.5IN MEM THK5UM BLNT DISP

## (undated) DEVICE — SHEET DRAPE FULL 70X100

## (undated) DEVICE — DRAPE 64X41IN RADIOLOGY C ARM EQUIP STER

## (undated) DEVICE — STANDARD HYPODERMIC NEEDLE,POLYPROPYLENE HUB: Brand: MONOJECT

## (undated) DEVICE — LAPAROSCOPIC SCISSORS: Brand: EPIX LAPAROSCOPIC SCISSORS

## (undated) DEVICE — SYRINGE MED 10ML LUERLOCK TIP W/O SFTY DISP

## (undated) DEVICE — BLADE SHVR AGRSV + RED BL 4.0MM

## (undated) DEVICE — YANKAUER,OPEN TIP,W/O VENT,STERILE: Brand: MEDLINE INDUSTRIES, INC.

## (undated) DEVICE — LUBRICANT SURG JELLY ST BACTER TUBE 4.25OZ

## (undated) DEVICE — PACK SURG LAP CHOLE CUSTOM

## (undated) DEVICE — COOK ENDOSCOPIC CHOLANGIOGRAPHY SET: Brand: COOK

## (undated) DEVICE — 6 X 9  1.75MIL 4-WALL LABGUARD: Brand: MINIGRIP COMMERCIAL LLC

## (undated) DEVICE — GOWN ISOLATN REG YEL M WT MULTIPLY SIDETIE LEV 2

## (undated) DEVICE — FORCEPS BX L240CM JAW DIA2.8MM L CAP W/ NDL MIC MESH TOOTH